# Patient Record
Sex: FEMALE | Race: BLACK OR AFRICAN AMERICAN | Employment: UNEMPLOYED | URBAN - METROPOLITAN AREA
[De-identification: names, ages, dates, MRNs, and addresses within clinical notes are randomized per-mention and may not be internally consistent; named-entity substitution may affect disease eponyms.]

---

## 2017-03-02 ENCOUNTER — OFFICE VISIT (OUTPATIENT)
Dept: FAMILY MEDICINE | Facility: CLINIC | Age: 37
End: 2017-03-02

## 2017-03-02 VITALS
SYSTOLIC BLOOD PRESSURE: 128 MMHG | HEIGHT: 63 IN | BODY MASS INDEX: 32.11 KG/M2 | HEART RATE: 87 BPM | OXYGEN SATURATION: 100 % | TEMPERATURE: 98.1 F | DIASTOLIC BLOOD PRESSURE: 86 MMHG | RESPIRATION RATE: 16 BRPM | WEIGHT: 181.2 LBS

## 2017-03-02 DIAGNOSIS — Z32.01 PREGNANCY EXAMINATION OR TEST, POSITIVE RESULT: Primary | ICD-10-CM

## 2017-03-02 LAB — HCG UR QL: POSITIVE

## 2017-03-02 RX ORDER — PNV NO.118/IRON FUMARATE/FA 29 MG-1 MG
1 TABLET,CHEWABLE ORAL DAILY
Qty: 90 TABLET | Refills: 1 | Status: SHIPPED | OUTPATIENT
Start: 2017-03-02 | End: 2017-04-17

## 2017-03-02 ASSESSMENT — ENCOUNTER SYMPTOMS
PALPITATIONS: 0
FEVER: 0
NAUSEA: 0
FATIGUE: 0
VOMITING: 0

## 2017-03-02 NOTE — PROGRESS NOTES
"      HPI:       Marti Vale is a 37 year old who presents for the following  Patient presents with:  Confirmation Of Pregnancy: patient had positive test at [facility] but wants care here      Pregnancy Test/Confirmation      Marti is a37 year old Woman,   without a significant past medical history who presents requesting a pregnancy test.   Her Patient's last menstrual period unknown.Previous normal periods: YES  Contraceptive method : none  Symptoms of pregnancy: Sour taste in the mouth   Any Bleeding since LMP? no    If pregnant, pregnancy is Unplanned, Desired    A Cameroonian  was used for  this visit.      Problem, Medication and Allergy Lists were reviewed and are current.  Patient is an established patient of this clinic.         Review of Systems:   Review of Systems   Constitutional: Negative for fatigue and fever.   Cardiovascular: Negative for chest pain, palpitations and leg swelling.   Gastrointestinal: Negative for nausea and vomiting.   Genitourinary: Negative for difficulty urinating, dysuria, menstrual problem, vaginal bleeding and vaginal discharge.   Neurological: Negative.    Psychiatric/Behavioral: Negative.              Physical Exam:   Patient Vitals for the past 24 hrs:   BP Temp Temp src Pulse Resp SpO2 Height Weight   17 1440 128/86 98.1  F (36.7  C) Oral 87 16 100 % 5' 2.5\" (158.8 cm) 181 lb 3.2 oz (82.2 kg)     Body mass index is 32.61 kg/(m^2).  Vitals were reviewed and were normal     Physical Exam   Constitutional: She is oriented to person, place, and time. She appears well-developed and well-nourished. No distress.   HENT:   Head: Normocephalic and atraumatic.   Cardiovascular: Normal rate, regular rhythm and normal heart sounds.    No murmur heard.  Pulmonary/Chest: Effort normal and breath sounds normal. No respiratory distress. She has no wheezes. She has no rales.   Neurological: She is alert and oriented to person, place, and time.   Psychiatric: She " has a normal mood and affect. Her behavior is normal. Judgment and thought content normal.       Results:      Results from the last 24 hours  Results for orders placed or performed in visit on 17 (from the past 24 hour(s))   HCG Qualitative Urine (UPT) ([place])   Result Value Ref Range    HCG Qual Urine POSITIVE Negative     Assessment and Plan     Marti was seen today for confirmation of pregnancy.    Diagnoses and all orders for this visit:    Pregnancy examination or test, positive result: LMP unknown. UPT positive. Her LMP was end of 2017.   -Dating ultrasound   -Will start pre- vitamins   -Encouraged to avoid medications such as ibuprofen or herbal medicine without consulting with her physician.   -     HCG Qualitative Urine (UPT) ([facility])  -     Cancel: US OB <14 WKS SINGLE OR FIRST GESTATION (IN CLINIC); Future  -     Prenatal Vit-Fe Fumarate-FA (PRENATAL 19) CHEW; Take 1 tablet by mouth daily  -     US OB <14 Weeks w Transvaginal Single; Future      Medications Discontinued During This Encounter   Medication Reason     ibuprofen (ADVIL,MOTRIN) 600 MG tablet Stopped by Patient     Options for treatment and follow-up care were reviewed with the patient. Marti Mairo Vale  engaged in the decision making process and verbalized understanding of the options discussed and agreed with the final plan.    Kellie Montaño MD  PGY 3 [facility]  [phone](pg)

## 2017-03-02 NOTE — PATIENT INSTRUCTIONS
Here is the plan from today's visit    1. Pregnancy examination or test, positive result: UPT positive. LMP not sure and maybe the end of January  - HCG Qualitative Urine (UPT) ([facility])  -  OB <14 WKS SINGLE OR FIRST GESTATION (IN CLINIC); Future  - Prenatal Vit-Fe Fumarate-FA (PRENATAL 19) CHEW; Take 1 tablet by mouth daily  Dispense: 90 tablet; Refill: 1      Please call or return to clinic if your symptoms don't go away.    Follow up plan  Follow up in 4-6 weeks for first OB visit    Thank you for coming to [facility] today.  Lab Testing:  **If you had lab testing today and your results are reassuring or normal they will be mailed to you or sent through [employer] within 7 days.   **If the lab tests need quick action we will call you with the results.  The phone number we will call with results is # [phone] (home) . If this is not the best number please call our clinic and change the number.  Medication Refills:  If you need any refills please call your pharmacy and they will contact us.   If you need to  your refill at a new pharmacy, please contact the new pharmacy directly. The new pharmacy will help you get your medications transferred faster.   Scheduling:  If you have any concerns about today's visit or wish to schedule another appointment please call our office during normal business hours [phone] (8-5:00 M-F)  If a referral was made to a [facility] and you don't get a call from central scheduling please call [phone].  If a Mammogram was ordered for you at [facility] call [phone] to schedule or change your appointment.  If you had an XRay/CT/Ultrasound/MRI ordered the number is [phone] to schedule or change your radiology appointment.   Medical Concerns:  If you have urgent medical concerns please call [phone] at any time of the day.  If you have a medical emergency please call 133.

## 2017-03-02 NOTE — PROGRESS NOTES
Preceptor Attestation:   Patient seen and discussed with the resident. Assessment and plan reviewed with resident and agreed upon.   Supervising Physician:  Lela Post MD  [facility]

## 2017-03-02 NOTE — MR AVS SNAPSHOT
After Visit Summary   3/2/2017    Marti Vale    MRN: [id number]           Patient Information     Date Of Birth          1980        Visit Information        Provider Department      3/2/2017 2:20 PM Kellie Montaño MD [facility]        Today's Diagnoses     Pregnancy examination or test, positive result    -  1      Care Instructions    Here is the plan from today's visit    1. Pregnancy examination or test, positive result: UPT positive. LMP not sure and maybe the end of January  - HCG Qualitative Urine (UPT) ([facility])  -  OB <14 WKS SINGLE OR FIRST GESTATION (IN CLINIC); Future  - Prenatal Vit-Fe Fumarate-FA (PRENATAL 19) CHEW; Take 1 tablet by mouth daily  Dispense: 90 tablet; Refill: 1      Please call or return to clinic if your symptoms don't go away.    Follow up plan  Follow up in 4-6 weeks for first OB visit    Thank you for coming to [facility] today.  Lab Testing:  **If you had lab testing today and your results are reassuring or normal they will be mailed to you or sent through [employer] within 7 days.   **If the lab tests need quick action we will call you with the results.  The phone number we will call with results is # [phone] (home) . If this is not the best number please call our clinic and change the number.  Medication Refills:  If you need any refills please call your pharmacy and they will contact us.   If you need to  your refill at a new pharmacy, please contact the new pharmacy directly. The new pharmacy will help you get your medications transferred faster.   Scheduling:  If you have any concerns about today's visit or wish to schedule another appointment please call our office during normal business hours [phone] (8-5:00 M-F)  If a referral was made to a [facility] and you don't get a call from central scheduling please call [phone].  If a Mammogram was ordered for you at [facility]  call [phone] to schedule or change your appointment.  If you had an XRay/CT/Ultrasound/MRI ordered the number is [phone] to schedule or change your radiology appointment.   Medical Concerns:  If you have urgent medical concerns please call [phone] at any time of the day.  If you have a medical emergency please call 911.        Follow-ups after your visit        Future tests that were ordered for you today     Open Future Orders        Priority Expected Expires Ordered    US OB <14 WKS SINGLE OR FIRST GESTATION (IN CLINIC) Routine  3/2/2018 3/2/2017            Who to contact     Please call your clinic at [phone] to:    Ask questions about your health    Make or cancel appointments    Discuss your medicines    Learn about your test results    Speak to your doctor   If you have compliments or concerns about an experience at your clinic, or if you wish to file a complaint, please contact [facility] Patient Relations at [phone] or email us at [email or web address]         Additional Information About Your Visit        [employer] Information     [employer] is an electronic gateway that provides easy, online access to your medical records. With [employer], you can request a clinic appointment, read your test results, renew a prescription or communicate with your care team.     To sign up for [employer] visit the website at [email or web address]   You will be asked to enter the access code listed below, as well as some personal information. Please follow the directions to create your username and password.     Your access code is: [email or web address]  Expires: 2017  3:11 PM     Your access code will  in 90 days. If you need help or a new code, please contact your [facility] or call [phone] for assistance.        Care EveryWhere ID     This is your Care EveryWhere ID. This could be used by other organizations to access your  "[facility] medical records  [id number]        Your Vitals Were     Pulse Temperature Respirations Height Last Period Pulse Oximetry    87 98.1  F (36.7  C) (Oral) 16 5' 2.5\" (158.8 cm) 01/02/2017 (Approximate) 100%    BMI (Body Mass Index)                   32.61 kg/m2            Blood Pressure from Last 3 Encounters:   03/02/17 128/86   10/21/15 109/75   10/07/15 118/78    Weight from Last 3 Encounters:   03/02/17 181 lb 3.2 oz (82.2 kg)   10/21/15 185 lb 12.8 oz (84.3 kg)   10/07/15 182 lb (82.6 kg)              We Performed the Following     HCG Qualitative Urine (UPT) ([facility])          Today's Medication Changes          These changes are accurate as of: 3/2/17  3:11 PM.  If you have any questions, ask your nurse or doctor.               Start taking these medicines.        Dose/Directions    PRENATAL 19 Chew   Used for:  Pregnancy examination or test, positive result   Started by:  Kellie Montaño MD        Dose:  1 tablet   Take 1 tablet by mouth daily   Quantity:  90 tablet   Refills:  1            Where to get your medicines      These medications were sent to [facility] [place], [place] - [address]   [address] [facility] [address]     Phone:  [phone]     PRENATAL 19 Chew                Primary Care Provider Office Phone # Fax #    [facility] [phone]       [address] [facility] [address]        Thank you!     Thank you for choosing [facility]  for your care. Our goal is always to provide you with excellent care. Hearing back from our patients is one way we can continue to improve our services. Please take a few minutes to complete the written survey that you may receive in the mail after your visit with us. Thank you!             Your Updated Medication List - Protect others around you: Learn how to safely use, store and throw away your medicines at www.disposemymeds.org.          This list is accurate as of: 3/2/17  3:11 " PM.  Always use your most recent med list.                   Brand Name Dispense Instructions for use    PRENATAL 19 Chew     90 tablet    Take 1 tablet by mouth daily       TYLENOL PO      Take 650 mg by mouth as needed for mild pain or fever

## 2017-03-03 ENCOUNTER — TELEPHONE (OUTPATIENT)
Dept: FAMILY MEDICINE | Facility: CLINIC | Age: 37
End: 2017-03-03

## 2017-03-03 ASSESSMENT — ENCOUNTER SYMPTOMS
DIFFICULTY URINATING: 0
DYSURIA: 0
NEUROLOGICAL NEGATIVE: 1
PSYCHIATRIC NEGATIVE: 1

## 2017-03-03 NOTE — TELEPHONE ENCOUNTER
Confirmation of pregnancy visit: 3/2/17    LMP: en of January 2017  Gestational Age: 5-6 weeks  Estimated Date of Delivery: TBD by US    Dating US has been ordered. Please inform patient to call [phone] to schedule US after 3/18/17.     NOB may be scheduled after US is completed. May be scheduled same day, but US must be completed first.    When calling to schedule NOB, please give scheduling preference to the following providers:    Male:  1. Dr. Pool  2. Dr. Mattson    Female:  1. Dr. LUPE Mena  2. Dr. Pierre    Other Notes:  Beninese  needed.    Please document call and close encounter.    Tiny Patterson RN

## 2017-03-08 ENCOUNTER — OFFICE VISIT (OUTPATIENT)
Dept: FAMILY MEDICINE | Facility: CLINIC | Age: 37
End: 2017-03-08

## 2017-03-08 VITALS
HEART RATE: 82 BPM | TEMPERATURE: 98.3 F | WEIGHT: 182 LBS | OXYGEN SATURATION: 99 % | SYSTOLIC BLOOD PRESSURE: 128 MMHG | HEIGHT: 63 IN | DIASTOLIC BLOOD PRESSURE: 84 MMHG | BODY MASS INDEX: 32.25 KG/M2 | RESPIRATION RATE: 16 BRPM

## 2017-03-08 DIAGNOSIS — O09.521 HIGH-RISK PREGNANCY, ELDERLY MULTIGRAVIDA, FIRST TRIMESTER: ICD-10-CM

## 2017-03-08 DIAGNOSIS — O21.9 NAUSEA AND VOMITING DURING PREGNANCY: Primary | ICD-10-CM

## 2017-03-08 DIAGNOSIS — K21.9 GASTROESOPHAGEAL REFLUX DISEASE WITHOUT ESOPHAGITIS: ICD-10-CM

## 2017-03-08 PROBLEM — O09.529 HIGH-RISK PREGNANCY, ELDERLY MULTIGRAVIDA, UNSPECIFIED TRIMESTER: Status: ACTIVE | Noted: 2017-03-08

## 2017-03-08 RX ORDER — ONDANSETRON 4 MG/1
4 TABLET, FILM COATED ORAL EVERY 8 HOURS PRN
Qty: 18 TABLET | Refills: 1 | Status: SHIPPED | OUTPATIENT
Start: 2017-03-08 | End: 2017-04-17

## 2017-03-08 ASSESSMENT — ENCOUNTER SYMPTOMS
ABDOMINAL PAIN: 0
VOMITING: 1
WHEEZING: 0
NAUSEA: 1
COUGH: 0
TROUBLE SWALLOWING: 0
DYSURIA: 0
FEVER: 0
EYE PAIN: 0

## 2017-03-08 NOTE — PROGRESS NOTES
"      HPI:       Marti Vale is a 37 year old who presents for the following  Patient presents with:  Vomiting: and heartburn X2days. Would like some zolfan and zantac  6w5d  Patient's last menstrual period was 01/20/2017 (exact date).  Has US scheduled tomorrow. Denies bleeding or pain.     GERD/Heartburn     Onset: 2 Days Ago    Description:     Burning in chest:no    Intensity: moderate    Progression of Symptoms: same    Accompanying Signs & Symptoms:  Does it feel like food gets stuck:no  Nausea: Yes Details: Vomiting (Pt is Pregnant)   Vomiting (bloody?): no  Abdominal Pain: no  Black-Tarry stools:no  Bloody stools: no   History:   Previous ulcers: {no    Precipitating factors:   Caffeine use: no  Alcohol use: no  NSAID/Aspirin use: no  Tobacco use: no  Worse with no particular food or drink.    Alleviating factors:  Nothing         Therapies Tried and outcome:chewable antacid  Pt states that she usually has nausea during pregnancies in the past.   A [employer]  was used for  this visit.      Problem, Medication and Allergy Lists were reviewed and are current.  Patient is an established patient of this clinic.         Review of Systems:   Review of Systems   Constitutional: Negative for fever.   HENT: Negative for trouble swallowing.    Eyes: Negative for pain.   Respiratory: Negative for cough and wheezing.    Cardiovascular: Negative for chest pain.   Gastrointestinal: Positive for nausea and vomiting. Negative for abdominal pain.   Genitourinary: Negative for dysuria.             Physical Exam:     Patient Vitals for the past 24 hrs:   BP Temp Temp src Pulse Resp SpO2 Height Weight   03/08/17 0906 128/84 98.3  F (36.8  C) Oral 82 16 99 % 5' 2.5\" (158.8 cm) 182 lb (82.6 kg)     Body mass index is 32.76 kg/(m^2).  Vitals were reviewed and were normal     Physical Exam   Constitutional: She is oriented to person, place, and time. She appears well-developed and well-nourished. No distress. "   Cardiovascular: Normal rate.    Neurological: She is alert and oriented to person, place, and time.   Skin: Skin is warm and dry. She is not diaphoretic.   Psychiatric: She has a normal mood and affect. Her behavior is normal. Thought content normal.   Nursing note and vitals reviewed.        Results:      Results from the last 24 hoursNo results found for this or any previous visit (from the past 24 hour(s)).  Assessment and Plan        1. Nausea and vomiting during pregnancy    - ondansetron (ZOFRAN) 4 MG tablet; Take 1 tablet (4 mg) by mouth every 8 hours as needed for nausea    Demonstrated P9 Accupressure point.     2. Gastroesophageal reflux disease without esophagitis    - ranitidine (ZANTAC) 300 MG tablet; Take 1 tablet (300 mg) by mouth At Bedtime       - Avoid spicy, fried, and foods with a lot of fat.      Please call or return to clinic if your symptoms don't go away.    Follow up plan  Please make a clinic appointment for follow up with your primary physician, Clinic in 1-2 weeks for first prenatal visit.      There are no discontinued medications.  Options for treatment and follow-up care were reviewed with the patient. Marti Vale  engaged in the decision making process and verbalized understanding of the options discussed and agreed with the final plan.    Yeison Corley MD

## 2017-03-08 NOTE — PATIENT INSTRUCTIONS
Here is the plan from today's visit    1. Nausea and vomiting during pregnancy    - ondansetron (ZOFRAN) 4 MG tablet; Take 1 tablet (4 mg) by mouth every 8 hours as needed for nausea      2. Gastroesophageal reflux disease without esophagitis    - ranitidine (ZANTAC) 300 MG tablet; Take 1 tablet (300 mg) by mouth At Bedtime       - Avoid spicy, fried, and foods with a lot of fat.      Please call or return to clinic if your symptoms don't go away.    Follow up plan  Please make a clinic appointment for follow up with your primary physician, Clinic in 1-2 weeks for first prenatal visit.    Thank you for coming to [facility] today.  Lab Testing:  **If you had lab testing today and your results are reassuring or normal they will be mailed to you or sent through [employer] within 7 days.   **If the lab tests need quick action we will call you with the results.  The phone number we will call with results is # [phone] (home) . If this is not the best number please call our clinic and change the number.  Medication Refills:  If you need any refills please call your pharmacy and they will contact us.   If you need to  your refill at a new pharmacy, please contact the new pharmacy directly. The new pharmacy will help you get your medications transferred faster.   Scheduling:  If you have any concerns about today's visit or wish to schedule another appointment please call our office during normal business hours [phone] (8-5:00 M-F)  If a referral was made to a [facility] and you don't get a call from central scheduling please call [phone].  If a Mammogram was ordered for you at [facility] call [phone] to schedule or change your appointment.  If you had an XRay/CT/Ultrasound/MRI ordered the number is [phone] to schedule or change your radiology appointment.   Medical Concerns:  If you have urgent medical concerns please call [phone] at any time of the  day.  If you have a medical emergency please call 911.

## 2017-03-08 NOTE — LETTER
[facility]  [address],  [address]  [facility] [address]  [phone]  [phone]    3/8/2017    Marti Vale  [address] [id number] [phone] (home)          To Whom it May Concern:    Marti Vale missed school on 3/8/2017 due to illness, patient should return to work on 3/10/17 without restrictions.    Please contact me for any questions or concerns.    Sincerely,      Yeison Corley MD

## 2017-03-08 NOTE — MR AVS SNAPSHOT
After Visit Summary   3/8/2017    Marti Vale    MRN: [id number]           Patient Information     Date Of Birth          1980        Visit Information        Provider Department      3/8/2017 9:00 AM Yeison Corley MD [facility]        Today's Diagnoses     Nausea and vomiting during pregnancy    -  1    Gastroesophageal reflux disease without esophagitis          Care Instructions    Here is the plan from today's visit    1. Nausea and vomiting during pregnancy    - ondansetron (ZOFRAN) 4 MG tablet; Take 1 tablet (4 mg) by mouth every 8 hours as needed for nausea      2. Gastroesophageal reflux disease without esophagitis    - ranitidine (ZANTAC) 300 MG tablet; Take 1 tablet (300 mg) by mouth At Bedtime       - Avoid spicy, fried, and foods with a lot of fat.      Please call or return to clinic if your symptoms don't go away.    Follow up plan  Please make a clinic appointment for follow up with your primary physician, Clinic in 1-2 weeks for first prenatal visit.    Thank you for coming to [facility] today.  Lab Testing:  **If you had lab testing today and your results are reassuring or normal they will be mailed to you or sent through [employer] within 7 days.   **If the lab tests need quick action we will call you with the results.  The phone number we will call with results is # [phone] (home) . If this is not the best number please call our clinic and change the number.  Medication Refills:  If you need any refills please call your pharmacy and they will contact us.   If you need to  your refill at a new pharmacy, please contact the new pharmacy directly. The new pharmacy will help you get your medications transferred faster.   Scheduling:  If you have any concerns about today's visit or wish to schedule another appointment please call our office during normal business hours [phone] (8-5:00 M-F)  If a referral was made to a [facility] and you don't get a call from central scheduling please call [phone].  If a Mammogram was ordered for you at [facility] call [phone] to schedule or change your appointment.  If you had an XRay/CT/Ultrasound/MRI ordered the number is [phone] to schedule or change your radiology appointment.   Medical Concerns:  If you have urgent medical concerns please call [phone] at any time of the day.  If you have a medical emergency please call 911.          Follow-ups after your visit        Your next 10 appointments already scheduled     Mar 09, 2017  3:40 PM CST   UL[facility]OUND with [facility] ([facility])    [address],  [address]  [place] [address]   [phone]              Who to contact     Please call your clinic at [phone] to:    Ask questions about your health    Make or cancel appointments    Discuss your medicines    Learn about your test results    Speak to your doctor   If you have compliments or concerns about an experience at your clinic, or if you wish to file a complaint, please contact [facility] Patient Relations at [phone] or email us at [email or web address]         Additional Information About Your Visit        [employer] Information     [employer] is an electronic gateway that provides easy, online access to your medical records. With [employer], you can request a clinic appointment, read your test results, renew a prescription or communicate with your care team.     To sign up for [employer] visit the website at [email or web address]   You will be asked to enter the access code listed below, as well as some personal information. Please follow the directions to create your username and password.     Your access code is: [email or web address]  Expires: 2017  3:11 PM     Your access code will  in 90 days. If you need help or a new code, please contact  "your [facility] or call [phone] for assistance.        Care EveryWhere ID     This is your Care EveryWhere ID. This could be used by other organizations to access your [facility] medical records  [id number]        Your Vitals Were     Pulse Temperature Respirations Height Last Period Pulse Oximetry    82 98.3  F (36.8  C) (Oral) 16 5' 2.5\" (158.8 cm) 01/20/2017 (Exact Date) 99%    Breastfeeding? BMI (Body Mass Index)                No 32.76 kg/m2           Blood Pressure from Last 3 Encounters:   03/08/17 128/84   03/02/17 128/86   10/21/15 109/75    Weight from Last 3 Encounters:   03/08/17 182 lb (82.6 kg)   03/02/17 181 lb 3.2 oz (82.2 kg)   10/21/15 185 lb 12.8 oz (84.3 kg)              Today, you had the following     No orders found for display         Today's Medication Changes          These changes are accurate as of: 3/8/17  9:44 AM.  If you have any questions, ask your nurse or doctor.               Start taking these medicines.        Dose/Directions    ondansetron 4 MG tablet   Commonly known as:  ZOFRAN   Used for:  Nausea and vomiting during pregnancy   Started by:  Yeison Corley MD        Dose:  4 mg   Take 1 tablet (4 mg) by mouth every 8 hours as needed for nausea   Quantity:  18 tablet   Refills:  1       ranitidine 300 MG tablet   Commonly known as:  ZANTAC   Used for:  Gastroesophageal reflux disease without esophagitis   Started by:  Yeison Corley MD        Dose:  300 mg   Take 1 tablet (300 mg) by mouth At Bedtime   Quantity:  30 tablet   Refills:  1            Where to get your medicines      These medications were sent to [facility] [place], [place] - [address]  [address] [facility] [address]     Phone:  [phone]     ondansetron 4 MG tablet    ranitidine 300 MG tablet                Primary Care Provider Office Phone # Fax #    [facility] [phone]       [address] [facility] [address]      "   Thank you!     Thank you for choosing [facility]  for your care. Our goal is always to provide you with excellent care. Hearing back from our patients is one way we can continue to improve our services. Please take a few minutes to complete the written survey that you may receive in the mail after your visit with us. Thank you!             Your Updated Medication List - Protect others around you: Learn how to safely use, store and throw away your medicines at www.disposemymeds.org.          This list is accurate as of: 3/8/17  9:44 AM.  Always use your most recent med list.                   Brand Name Dispense Instructions for use    ondansetron 4 MG tablet    ZOFRAN    18 tablet    Take 1 tablet (4 mg) by mouth every 8 hours as needed for nausea       PRENATAL 19 Chew     90 tablet    Take 1 tablet by mouth daily       ranitidine 300 MG tablet    ZANTAC    30 tablet    Take 1 tablet (300 mg) by mouth At Bedtime       TYLENOL PO      Take 650 mg by mouth as needed for mild pain or fever Reported on 3/8/2017

## 2017-03-08 NOTE — LETTER
[facility]   [address],  [address]  [facility] [address]  [phone]  [phone]    3/8/2017    Marti Vale  [address] [facility] [address] [phone] (home)     :  1980    To Whom it May Concern:    Marti Jhonyyaritza Kavin missed work on 3/8/2017 due to illness patient should return to work on 3/10/17 without restrictions.    Please contact me for any questions or concerns.    Sincerely,      Yeison Corley MD

## 2017-03-08 NOTE — NURSING NOTE
name: Pati Ruslan  Language: Sammarinese  Agency: [employer]  Phone number: [phone]  Marika Benz CMA

## 2017-03-08 NOTE — TELEPHONE ENCOUNTER
Inform patient: You will see two providers throughout your prenatal care. One of them may be a male, will that be a problem for you? No    Additional patient comments: US scheduled for 3/9/17, which is too soon. Advised patient of this and of appointment cancellation. Advised patient that she will need to call [facility] to reschedule US for after 3/18/17. Patient advised that she was driving and unable to accept phone number for US scheduling; will call back. Advised patient to call [facility] after US completed in order to schedule NOB appointment.    Dating US scheduled: Patient will call to schedule  NOB scheduled TBD with TBD (this will be their primary OB provider).    Melina Gallego

## 2017-03-20 ENCOUNTER — HOSPITAL ENCOUNTER (OUTPATIENT)
Dept: ULTRASOUND IMAGING | Facility: CLINIC | Age: 37
Discharge: HOME | End: 2017-03-20
Attending: FAMILY MEDICINE | Admitting: FAMILY MEDICINE
Payer: COMMERCIAL

## 2017-03-20 DIAGNOSIS — Z32.01 PREGNANCY EXAMINATION OR TEST, POSITIVE RESULT: ICD-10-CM

## 2017-03-20 PROCEDURE — 76801 OB US < 14 WKS SINGLE FETUS: CPT

## 2017-03-21 ENCOUNTER — OFFICE VISIT (OUTPATIENT)
Dept: FAMILY MEDICINE | Facility: CLINIC | Age: 37
End: 2017-03-21

## 2017-03-21 VITALS
TEMPERATURE: 98 F | WEIGHT: 183.4 LBS | BODY MASS INDEX: 33.01 KG/M2 | RESPIRATION RATE: 16 BRPM | SYSTOLIC BLOOD PRESSURE: 105 MMHG | OXYGEN SATURATION: 100 % | DIASTOLIC BLOOD PRESSURE: 73 MMHG | HEART RATE: 79 BPM

## 2017-03-21 DIAGNOSIS — O09.529 HIGH-RISK PREGNANCY, ELDERLY MULTIGRAVIDA, UNSPECIFIED TRIMESTER: Primary | ICD-10-CM

## 2017-03-21 DIAGNOSIS — O21.9 NAUSEA/VOMITING IN PREGNANCY: ICD-10-CM

## 2017-03-21 DIAGNOSIS — Z86.39 HISTORY OF VITAMIN D DEFICIENCY: ICD-10-CM

## 2017-03-21 LAB
ABO + RH BLD: NORMAL
ABO + RH BLD: NORMAL
BILIRUBIN UR: NEGATIVE
BLD GP AB SCN SERPL QL: NORMAL
BLOOD BANK CMNT PATIENT-IMP: NORMAL
BLOOD UR: NEGATIVE
DEPRECATED CALCIDIOL+CALCIFEROL SERPL-MC: 14 UG/L (ref 20–75)
GLUCOSE URINE: NEGATIVE
HBV SURFACE AB SERPL IA-ACNC: 76.07 M[IU]/ML
HBV SURFACE AG SERPL QL IA: NONREACTIVE
HEMOGLOBIN: 13 G/DL (ref 11.7–15.7)
HIV 1+2 AB+HIV1 P24 AG SERPL QL IA: NORMAL
KETONES UR QL: NEGATIVE
LEUKOCYTE ESTERASE UR: NEGATIVE
NITRITE UR QL STRIP: NEGATIVE
PH UR STRIP: 7 [PH] (ref 5–7)
PROTEIN UR: NEGATIVE
SP GR UR STRIP: 1.01
SPECIMEN EXP DATE BLD: NORMAL
UROBILINOGEN UR STRIP-ACNC: NORMAL

## 2017-03-21 RX ORDER — CALCIUM CARBONATE 500 MG/1
2 TABLET, CHEWABLE ORAL 3 TIMES DAILY PRN
Qty: 150 TABLET | Refills: 1 | Status: SHIPPED | OUTPATIENT
Start: 2017-03-21 | End: 2017-04-17

## 2017-03-21 RX ORDER — PYRIDOXINE HCL (VITAMIN B6) 25 MG
25 TABLET ORAL DAILY
Qty: 28 TABLET | Refills: 2 | Status: SHIPPED | OUTPATIENT
Start: 2017-03-21 | End: 2017-04-17

## 2017-03-21 ASSESSMENT — ENCOUNTER SYMPTOMS
NAUSEA: 1
CARDIOVASCULAR NEGATIVE: 1
RESPIRATORY NEGATIVE: 1
APPETITE CHANGE: 1
VOMITING: 1
ABDOMINAL PAIN: 0

## 2017-03-21 NOTE — TELEPHONE ENCOUNTER
US completed 3/20/17. Patient scheduled NOB appointment with Dr LUPE Mena at 1:00pm on 3/27/17. Patient advised that she would only like to see female providers only.

## 2017-03-21 NOTE — MR AVS SNAPSHOT
After Visit Summary   3/21/2017    Marti aVle    MRN: [id number]           Patient Information     Date Of Birth          1980        Visit Information        Provider Department      3/21/2017 8:40 AM Sonam Veloz APRN CNP [facility]        Today's Diagnoses     High-risk pregnancy, elderly multigravida, unspecified trimester    -  1    Nausea/vomiting in pregnancy        History of vitamin D deficiency          Care Instructions    Here is the plan from today's visit    1. Nausea/vomiting in pregnancy    - doxylamine (UNISOM) 25 MG TABS tablet; Take 1 tablet (25 mg) by mouth At Bedtime  Dispense: 28 each; Refill: 2  - pyridOXINE (VITAMIN B-6) 25 MG tablet; Take 1 tablet (25 mg) by mouth daily  Dispense: 28 tablet; Refill: 2  - calcium carbonate (TUMS) 500 MG chewable tablet; Take 2 tablets (1,000 mg) by mouth 3 times daily as needed for heartburn  Dispense: 150 tablet; Refill: 1    2. High-risk pregnancy, elderly multigravida, unspecified trimester    - Hemoglobin (HGB) (LabDAQ)  - Hepatitis B surface antigen  - Hepatitis B Surface Antibody  - Anti Treponema  - HIV Antigen Antibody Combo  - Urine Culture Aerobic Bacterial  - Rubella Antibody IgG Quantitative  - Varicella Zoster Virus Antibody IgG  - ABO/Rh type and screen  - Neisseria gonorrhoeae PCR  - Chlamydia trachomatis PCR  - Urinalysis(LabDAQ)  - Vitamin D Level    3. History of vitamin D deficiency    - cholecalciferol (VITAMIN D) 1000 UNIT tablet; Take 1 tablet (1,000 Units) by mouth daily  Dispense: 100 tablet; Refill: 3      Follow-up for 1st OB appointment.        Thank you for coming to [facility] today.  Lab Testing:  **If you had lab testing today and your results are reassuring or normal they will be mailed to you or sent through [employer] within 7 days.   **If the lab tests need quick action we will call you with the results.  The phone number we will call with results is # [phone]  (home) . If this is not the best number please call our clinic and change the number.  Medication Refills:  If you need any refills please call your pharmacy and they will contact us.   If you need to  your refill at a new pharmacy, please contact the new pharmacy directly. The new pharmacy will help you get your medications transferred faster.   Scheduling:  If you have any concerns about today's visit or wish to schedule another appointment please call our office during normal business hours [phone] (8-5:00 M-F)  If a referral was made to a [facility] and you don't get a call from central scheduling please call [phone].  If a Mammogram was ordered for you at [facility] call [phone] to schedule or change your appointment.  If you had an XRay/CT/Ultrasound/MRI ordered the number is [phone] to schedule or change your radiology appointment.   Medical Concerns:  If you have urgent medical concerns please call [phone] at any time of the day.  If you have a medical emergency please call [phone].          Follow-ups after your visit        Who to contact     Please call your clinic at [phone] to:    Ask questions about your health    Make or cancel appointments    Discuss your medicines    Learn about your test results    Speak to your doctor   If you have compliments or concerns about an experience at your clinic, or if you wish to file a complaint, please contact [facility] Patient Relations at [phone] or email us at [email or web address]         Additional Information About Your Visit        MyChart Information     [employer] is an electronic gateway that provides easy, online access to your medical records. With [employer], you can request a clinic appointment, read your test results, renew a prescription or communicate with your care team.     To sign up for [employer] visit the website at [email or web address]    You will be asked to enter the access code listed below, as well as some personal information. Please follow the directions to create your username and password.     Your access code is: [email or web address]  Expires: 2017  4:11 PM     Your access code will  in 90 days. If you need help or a new code, please contact your [facility] or call [phone] for assistance.        Care EveryWhere ID     This is your Care EveryWhere ID. This could be used by other organizations to access your [place] medical records  [id number]        Your Vitals Were     Pulse Temperature Respirations Last Period Pulse Oximetry BMI (Body Mass Index)    79 98  F (36.7  C) (Oral) 16 2017 (Exact Date) 100% 33.01 kg/m2       Blood Pressure from Last 3 Encounters:   17 105/73   17 128/84   17 128/86    Weight from Last 3 Encounters:   17 183 lb 6.4 oz (83.2 kg)   17 182 lb (82.6 kg)   17 181 lb 3.2 oz (82.2 kg)              We Performed the Following     ABO/Rh type and screen     Anti Treponema     Chlamydia trachomatis PCR     Hemoglobin (HGB) (LabDAQ)     Hepatitis B Surface Antibody     Hepatitis B surface antigen     HIV Antigen Antibody Combo     Neisseria gonorrhoeae PCR     Rubella Antibody IgG Quantitative     Urinalysis(LabDAQ)     Urine Culture Aerobic Bacterial     Varicella Zoster Virus Antibody IgG     Vitamin D Level          Today's Medication Changes          These changes are accurate as of: 3/21/17  9:25 AM.  If you have any questions, ask your nurse or doctor.               Start taking these medicines.        Dose/Directions    calcium carbonate 500 MG chewable tablet   Commonly known as:  TUMS   Used for:  Nausea/vomiting in pregnancy   Started by:  Sonam Veloz APRN CNP        Dose:  2 chew tab   Take 2 tablets (1,000 mg) by mouth 3 times daily as needed for heartburn   Quantity:  150 tablet   Refills:  1       cholecalciferol 1000  UNIT tablet   Commonly known as:  vitamin D   Used for:  History of vitamin D deficiency   Started by:  Sonam Veloz APRN CNP        Dose:  1000 Units   Take 1 tablet (1,000 Units) by mouth daily   Quantity:  100 tablet   Refills:  3       doxylamine 25 MG Tabs tablet   Commonly known as:  UNISOM   Used for:  Nausea/vomiting in pregnancy   Started by:  Sonam Veloz APRN CNP        Dose:  25 mg   Take 1 tablet (25 mg) by mouth At Bedtime   Quantity:  28 each   Refills:  2       pyridOXINE 25 MG tablet   Commonly known as:  vitamin B-6   Used for:  Nausea/vomiting in pregnancy   Started by:  Sonam Veloz APRN CNP        Dose:  25 mg   Take 1 tablet (25 mg) by mouth daily   Quantity:  28 tablet   Refills:  2            Where to get your medicines      These medications were sent to [facility] [place], [place] - [address] [facility] [address]     Phone:  [phone]     calcium carbonate 500 MG chewable tablet    cholecalciferol 1000 UNIT tablet    doxylamine 25 MG Tabs tablet    pyridOXINE 25 MG tablet                Primary Care Provider Office Phone # Fax #    [facility] [phone]       [address] [facility] [address]        Thank you!     Thank you for choosing [facility]  for your care. Our goal is always to provide you with excellent care. Hearing back from our patients is one way we can continue to improve our services. Please take a few minutes to complete the written survey that you may receive in the mail after your visit with us. Thank you!             Your Updated Medication List - Protect others around you: Learn how to safely use, store and throw away your medicines at www.disposemymeds.org.          This list is accurate as of: 3/21/17  9:25 AM.  Always use your most recent med list.                   Brand Name Dispense Instructions for use    calcium carbonate 500 MG chewable tablet     TUMS    150 tablet    Take 2 tablets (1,000 mg) by mouth 3 times daily as needed for heartburn       cholecalciferol 1000 UNIT tablet    vitamin D    100 tablet    Take 1 tablet (1,000 Units) by mouth daily       doxylamine 25 MG Tabs tablet    UNISOM    28 each    Take 1 tablet (25 mg) by mouth At Bedtime       ondansetron 4 MG tablet    ZOFRAN    18 tablet    Take 1 tablet (4 mg) by mouth every 8 hours as needed for nausea       PRENATAL 19 Chew     90 tablet    Take 1 tablet by mouth daily       pyridOXINE 25 MG tablet    vitamin B-6    28 tablet    Take 1 tablet (25 mg) by mouth daily       ranitidine 300 MG tablet    ZANTAC    30 tablet    Take 1 tablet (300 mg) by mouth At Bedtime       TYLENOL PO      Take 650 mg by mouth as needed for mild pain or fever Reported on 3/8/2017

## 2017-03-21 NOTE — PROGRESS NOTES
HPI:       Marti Vale is a 37 year old who presents for the following  Patient presents with:  Nausea    She is 8w5d. Had a dating ultrasound yesterday. States she has had nausea for about one month. This is her 8th pregnancy. States she has had nausea with some of her pregnancies in the past, but does not remember what she took.  Nausea is worst in the afternoon around 1400.  Has nausea every day for the past month.  States she vomits a small amount everyday.  Has nausea before and after meals. Has taken 4mg ondansetron HCl as needed every 8 hours with no relief. She is also taking ranitidine every day in the morning.  States she is only able to eat pasta and bread.  States she often eats spicy sauce in the afternoon.  She is drinking less than one bottle of water each day. She has not tried any other medications. She did take vitamin B6 for a previous pregnancy.      A [employer]  was used for  this visit.      Problem, Medication and Allergy Lists were   reviewed and are current.     Patient Active Problem List    Diagnosis Date Noted     Ileitis 10/14/2015     Priority: Medium     High-risk pregnancy, elderly multigravida, unspecified trimester 2017       Estimated Date of Delivery: Oct 27, 2017 by Patient's last menstrual period was 2017 (exact date).           ,     Current Outpatient Prescriptions   Medication Sig Dispense Refill     doxylamine (UNISOM) 25 MG TABS tablet Take 1 tablet (25 mg) by mouth At Bedtime 28 each 2     pyridOXINE (VITAMIN B-6) 25 MG tablet Take 1 tablet (25 mg) by mouth daily 28 tablet 2     calcium carbonate (TUMS) 500 MG chewable tablet Take 2 tablets (1,000 mg) by mouth 3 times daily as needed for heartburn 150 tablet 1     cholecalciferol (VITAMIN D) 1000 UNIT tablet Take 1 tablet (1,000 Units) by mouth daily 100 tablet 3     ranitidine (ZANTAC) 300 MG tablet Take 1 tablet (300 mg) by mouth At Bedtime 30 tablet 1     ondansetron (ZOFRAN) 4  MG tablet Take 1 tablet (4 mg) by mouth every 8 hours as needed for nausea 18 tablet 1     Prenatal Vit-Fe Fumarate-FA (PRENATAL 19) CHEW Take 1 tablet by mouth daily (Patient not taking: Reported on 3/8/2017) 90 tablet 1     Acetaminophen (TYLENOL PO) Take 650 mg by mouth as needed for mild pain or fever Reported on 3/8/2017       No Known Allergies    Patient is an established patient of this clinic.         Review of Systems:   Review of Systems   Constitutional: Positive for appetite change.   HENT: Negative.    Respiratory: Negative.    Cardiovascular: Negative.    Gastrointestinal: Positive for nausea and vomiting. Negative for abdominal pain.             Physical Exam:   Patient Vitals for the past 24 hrs:   BP Temp Temp src Pulse Resp SpO2 Weight   03/21/17 0852 105/73 98  F (36.7  C) Oral 79 16 100 % 183 lb 6.4 oz (83.2 kg)     Body mass index is 33.01 kg/(m^2).  Vitals were reviewed and were normal     Physical Exam   Constitutional: She appears well-developed and well-nourished. No distress.   Cardiovascular: Normal rate, regular rhythm and normal heart sounds.  Exam reveals no gallop and no friction rub.    No murmur heard.  Pulmonary/Chest: Effort normal and breath sounds normal. No respiratory distress. She has no wheezes. She has no rales. She exhibits no tenderness.   Abdominal: Soft. She exhibits no distension. There is no tenderness.   Skin: She is not diaphoretic.         Results:      Results from the last 24 hours  Results for orders placed or performed in visit on 03/21/17 (from the past 24 hour(s))   Hemoglobin (HGB) (LabDAQ)   Result Value Ref Range    Hemoglobin 13.0 11.7 - 15.7 g/dL   Urinalysis(LabDAQ)   Result Value Ref Range    Specific Gravity Urine 1.015 1.005 - 1.030    pH Urine 7.0 4.5 - 8.0    Leukocyte Esterase UR Negative NEGATIVE    Nitrite Urine Negative NEGATIVE    Protein UR Negative NEGATIVE    Glucose Urine Negative NEGATIVE    Ketones Urine Negative NEGATIVE    Urobilinogen  mg/dL 0.2 E.U./dL 0.2 E.U./dL    Bilirubin UR Negative NEGATIVE    Blood UR Negative NEGATIVE     Other labs pending for initial OB visit.     Assessment and Plan     1. Nausea/vomiting in pregnancy  Patient instructed to eat small frequent meals, increase water intake, and avoid food that increase symptoms. Patient should continue ranitidine every morning, take ondansetron PRN, Tums PRN, and schedule unisom and vitamin B6 at bedtime. Return to clinic if symptoms do not improve or worse with treatment plan.   - doxylamine (UNISOM) 25 MG TABS tablet; Take 1 tablet (25 mg) by mouth At Bedtime  Dispense: 28 each; Refill: 2  - pyridOXINE (VITAMIN B-6) 25 MG tablet; Take 1 tablet (25 mg) by mouth daily  Dispense: 28 tablet; Refill: 2  - calcium carbonate (TUMS) 500 MG chewable tablet; Take 2 tablets (1,000 mg) by mouth 3 times daily as needed for heartburn  Dispense: 150 tablet; Refill: 1    2. High-risk pregnancy, elderly multigravida, unspecified trimester  Patient requested to have labs checked for initial OB visit. Will need to follow-up with results at OB appointment.   - Hemoglobin (HGB) (LabDAQ)  - Hepatitis B surface antigen  - Hepatitis B Surface Antibody  - Anti Treponema  - HIV Antigen Antibody Combo  - Urine Culture Aerobic Bacterial  - Rubella Antibody IgG Quantitative  - Varicella Zoster Virus Antibody IgG  - ABO/Rh type and screen  - Neisseria gonorrhoeae PCR  - Chlamydia trachomatis PCR  - Urinalysis(LabDAQ)  - Vitamin D Level    3. History of vitamin D deficiency  Vitamin D level checked in lab today. Will start with 1000 U tablet today and adjust dose at next visit based on lab result.   - cholecalciferol (VITAMIN D) 1000 UNIT tablet; Take 1 tablet (1,000 Units) by mouth daily  Dispense: 100 tablet; Refill: 3    There are no discontinued medications.    Options for treatment and follow-up care were reviewed with the patient. Marti Vale  engaged in the decision making process and verbalized  understanding of the options discussed and agreed with the final plan.      Doretha Portillo, VIN    History and physical examination done with student nurse practitioner.  Student acted as scribe for this encounter.  I agree with assessment and plan for this patient.     ДМИТРИЙ Aguilar CNP

## 2017-03-21 NOTE — PATIENT INSTRUCTIONS
Here is the plan from today's visit    1. Nausea/vomiting in pregnancy    - doxylamine (UNISOM) 25 MG TABS tablet; Take 1 tablet (25 mg) by mouth At Bedtime  Dispense: 28 each; Refill: 2  - pyridOXINE (VITAMIN B-6) 25 MG tablet; Take 1 tablet (25 mg) by mouth daily  Dispense: 28 tablet; Refill: 2  - calcium carbonate (TUMS) 500 MG chewable tablet; Take 2 tablets (1,000 mg) by mouth 3 times daily as needed for heartburn  Dispense: 150 tablet; Refill: 1    2. High-risk pregnancy, elderly multigravida, unspecified trimester    - Hemoglobin (HGB) ([facility])  - Hepatitis B surface antigen  - Hepatitis B Surface Antibody  - Anti Treponema  - HIV Antigen Antibody Combo  - Urine Culture Aerobic Bacterial  - Rubella Antibody IgG Quantitative  - Varicella Zoster Virus Antibody IgG  - ABO/Rh type and screen  - Neisseria gonorrhoeae PCR  - Chlamydia trachomatis PCR  - Urinalysis([facility])  - Vitamin D Level    3. History of vitamin D deficiency    - cholecalciferol (VITAMIN D) 1000 UNIT tablet; Take 1 tablet (1,000 Units) by mouth daily  Dispense: 100 tablet; Refill: 3      Follow-up for 1st OB appointment.        Thank you for coming to [facility] today.  Lab Testing:  **If you had lab testing today and your results are reassuring or normal they will be mailed to you or sent through [employer] within 7 days.   **If the lab tests need quick action we will call you with the results.  The phone number we will call with results is # [phone] (home) . If this is not the best number please call our clinic and change the number.  Medication Refills:  If you need any refills please call your pharmacy and they will contact us.   If you need to  your refill at a new pharmacy, please contact the new pharmacy directly. The new pharmacy will help you get your medications transferred faster.   Scheduling:  If you have any concerns about today's visit or wish to schedule another appointment please call our office during normal  business hours [phone] (8-5:00 M-F)  If a referral was made to a [facility] and you don't get a call from central scheduling please call [phone].  If a Mammogram was ordered for you at [facility] call [phone] to schedule or change your appointment.  If you had an XRay/CT/Ultrasound/MRI ordered the number is [phone] to schedule or change your radiology appointment.   Medical Concerns:  If you have urgent medical concerns please call [phone] at any time of the day.  If you have a medical emergency please call [phone].

## 2017-03-22 LAB
BACTERIA SPEC CULT: NORMAL
C TRACH DNA SPEC QL NAA+PROBE: NORMAL
Lab: NORMAL
MICRO REPORT STATUS: NORMAL
N GONORRHOEA DNA SPEC QL NAA+PROBE: NORMAL
RUBV IGG SERPL IA-ACNC: 37 IU/ML
SPECIMEN SOURCE: NORMAL
T PALLIDUM IGG+IGM SER QL: NEGATIVE
VZV IGG SER QL IA: 0.9 AI (ref 0–0.8)

## 2017-03-27 ENCOUNTER — OFFICE VISIT (OUTPATIENT)
Dept: FAMILY MEDICINE | Facility: CLINIC | Age: 37
End: 2017-03-27

## 2017-03-27 VITALS
TEMPERATURE: 97.9 F | OXYGEN SATURATION: 100 % | HEIGHT: 63 IN | DIASTOLIC BLOOD PRESSURE: 82 MMHG | SYSTOLIC BLOOD PRESSURE: 126 MMHG | HEART RATE: 70 BPM | WEIGHT: 182.8 LBS | BODY MASS INDEX: 32.39 KG/M2 | RESPIRATION RATE: 18 BRPM

## 2017-03-27 DIAGNOSIS — E55.9 VITAMIN D DEFICIENCY: ICD-10-CM

## 2017-03-27 DIAGNOSIS — O09.529 HIGH-RISK PREGNANCY, ELDERLY MULTIGRAVIDA, UNSPECIFIED TRIMESTER: Primary | ICD-10-CM

## 2017-03-27 DIAGNOSIS — R11.0 NAUSEA: ICD-10-CM

## 2017-03-27 NOTE — PATIENT INSTRUCTIONS
Here is the plan from today's visit    1. High-risk pregnancy, elderly multigravida, unspecified trimester  Your pregnancy is considered high risk since you are over the age of 35 and have had multiple pregnancies.     2. Vitamin D deficiency  Take 1 tablet daily.  We will recheck your level in approximately 3 months.  - cholecalciferol (VITAMIN D) 1000 UNIT tablet; Take 1 tablet (1,000 Units) by mouth daily  Dispense: 100 tablet; Refill: 1    3. Nausea  Continue taking the B6 and Zofran as needed for nausea.  - Acupressure Device (SEA-BAND ADULT) BHARGAVI; 1 Device as needed  Dispense: 1 each; Refill: 0    Please call or return to clinic if your symptoms don't go away.    Follow up plan  Please make a clinic appointment for follow up with me (SHAKIR ELLSWORTH) in 3  weeks for routine OB visit (at 12 weeks).    Thank you for coming to [facility] today.  Lab Testing:  **If you had lab testing today and your results are reassuring or normal they will be mailed to you or sent through [employer] within 7 days.   **If the lab tests need quick action we will call you with the results.  The phone number we will call with results is # [phone] (home) . If this is not the best number please call our clinic and change the number.  Medication Refills:  If you need any refills please call your pharmacy and they will contact us.   If you need to  your refill at a new pharmacy, please contact the new pharmacy directly. The new pharmacy will help you get your medications transferred faster.   Scheduling:  If you have any concerns about today's visit or wish to schedule another appointment please call our office during normal business hours [phone] (8-5:00 M-F)  If a referral was made to a [facility] and you don't get a call from central scheduling please call [phone].  If a Mammogram was ordered for you at [facility] call [phone] to schedule or change your  appointment.  If you had an XRay/CT/Ultrasound/MRI ordered the number is [phone] to schedule or change your radiology appointment.   Medical Concerns:  If you have urgent medical concerns please call [phone] at any time of the day.      Thank you for coming to [facility]!  - If you had lab testing today and your results are normal they will be be mailed to you or sent to your MyChart within seven days.   - If the lab tests need quick action we will call you with the results.  - If any referrals were ordered today you should be getting a call in the next week.  If you don't hear about this within a week please call one of the following numbers       --f your referral is for [facility] please call [phone]    --If your referral is to outside [facility] please call the clinic number ([phone]) and ask for your team care coordinator.  - If you need any refills please call your pharmacy and they will contact us.  - If you have any concerns about today's visit or wish to schedule another appointment please call our office [phone] (8-5:00 M-F)  - If you have urgent medical concerns call [phone] at any time of the day.  - If you have a medical emergency please call [phone].    Because you are pregnant, we have additional resources for you:  - You may call Noemi Cochran, our OB coordinator at [phone] during normal business hours, for non-urgent questions about your pregnancy.  - Immediate OB help is also available 24 hours a day, seven days a week via the [facility] at [phone].    Prenatal Reminders:  Before 14 weeks: dating ultrasound       This ultrasound helps us determine your dates accurately.  15 - 18 weeks: Optional genetic testing (quad screen)       This testing helps understand your baby's risk for some genetic abnormalities.  22 - 24 weeks: Growth ultrasound (fetal survey)       This testing will look for early growth abnormalities, and might tell the baby's sex.  24  - 28 weeks: One hour sugar test (GCT)        This test helps identify diabetes of pregnancy.  27 - 36 weeks: Tetanus shot (Tdap)       This shot helps protect you and your baby from tetanus and whooping cough.  36 weeks and later: Group B Strep test (GBS)       This test helps predict if you need antibiotics in labor to prevent infection in your baby.  Anytime September to April: flu shot       This shot helps protect you and your family from the flu.  This is especially important during pregnancy!  Once every trimester: blood iron test (hemoglobin)       This test helps us know if you will need extra iron to support your baby.    At any time during or after your pregnancy: Some women experience baby blues.  We can help you with:  ? Feeling anxious, ? Overwhelmed or sad       ? Trouble sleeping ? Crying uncontrollably     ? Trouble caring for yourself or baby.    Again, thank you for choosing [facility].  Please let us know how we can best partner with you to improve your and your family's health.

## 2017-03-27 NOTE — PROGRESS NOTES
Preceptor Attestation:   Patient seen and discussed with the resident. Assessment and plan reviewed with resident and agreed upon.   Supervising Physician:  Rainer Guerrero MD  [facility]

## 2017-03-27 NOTE — PROGRESS NOTES
First Obstetric Visit        HPI       Marti Vale is a 37 year old woman who presents for an initial prenatal visit at 9w3d weeks of pregnancy with STALIN of Oct 26, 2017 by LMP of Patient's last menstrual period was 2017..      She has not had bleeding since her LMP.   She has had mild nausea.   Weight loss has not occurred.    This was not a planned pregnancy.     OTHER CONCERNS: none.  Patient has no complaints except mild nausea.  No urinary symptoms such as hematuria, dysuria or frequency.            Labor Risk Assessment     Is the patient's age <18 or >40?     No  Patint's BMI is Body mass index is 32.9 kg/(m^2).   Does patient have a BMI < 18.5?     No  Prior delivery within 6 months?      No  Ever delivered prior to 37 weeks gestation?  No  Pregnancy occur via In Vitro Fertilization?   No  Are you carrying twins?       No    The patient has the following additional risk factors for  labor:   none     Labor Risk Summary:  Pt is high risk for  Labor  No             Past Medical History     Past Medical History:   Diagnosis Date     Anemia        Do you have a history of any of the following medical conditions?    Condition No/Yes/Details   Hypertension No   Heart disease, mitral valve prolapse, rheumatic fever No   Asthma or another chronic lung disease No    An autoimmune disorder No    Kidney disease No    Frequent urinary tract infections No    Migraine headaches No    Stroke, loss of sensation/function, seizures, or other neuro problem No    Diabetes No    Thyroid problems or have you taken thyroid medication No    Hepatitis, liver disease, jaundice No    Blood clots, phlebitis, pulmonary embolism or varicose veins No    Excessive bleeding after surgery or dental work No    Heavy menstrual periods requiring treatment No    Anemia  YES after   D&C, no transfusion   Blood transfusions No         Would you refuse a blood transfusion? No    Breast problems No     Abnormalities of the uterus No    Abnormal pap smear No    Have you been treated for an emotional disturbance? No    Have you been physically, sexually, or emotionally hurt by someone? No    Have you been in a major accident or suffered serious trauma? No    Have you had surgical procedures?  YES         Have you ever had any complications from anesthesia? No    Have you ever been hospitalized for a nonsurgical reason? No                Genetic Screening     Do you have a history of any of the following No/Yes/Details        A metabolic disorder (e.g. Insulin-dependent DM, PKU) No         Recurrent pregnancy loss  YES- 2 miscarriages     Do you, the baby's father, or anyone in your families have          Thalassemia AND MCV <80 No         Hemophilia No         Neural tube defect No }        Congenital heart defect No         Sickle cell disease or trait No         Muscular dystrophy No         Cystic fibrosis No         Mental retardation or autism No         Down's syndrome No         Armond-Sach's disease No         Amador's chorea No         Any other inherited genetic or chromosomal disorder No         A child with birth defects not listed above No                Infection History     At high risk for coming in contact with HIV No    Ever treated for tuberculosis No    Ever received the BCG vaccine for tuberculosis Yes   Ever had genital herpes (or has your partner) No    Had a rash or viral illness since LMP No    Ever had a sexually transmitted infection No    Ever had chicken pox or the vaccine No    Ever had any other serious infectious disease No    Up to date with immunizations  YES             Habits     Have you ever used tobacco products (cigarettes, chewing tobacco)? No, Do you currently use tobacco products? No. , Have you ever used alcohol? No, Have you ever used any other drugs? No         Current Medications      Current Outpatient Prescriptions   Medication Sig Dispense Refill  "    cholecalciferol (VITAMIN D) 1000 UNIT tablet Take 1 tablet (1,000 Units) by mouth daily 100 tablet 1     Acupressure Device (SEA-BAND ADULT) BHARGAVI 1 Device as needed 1 each 0     pyridOXINE (VITAMIN B-6) 25 MG tablet Take 1 tablet (25 mg) by mouth daily 28 tablet 2     Prenatal Vit-Fe Fumarate-FA (PRENATAL 19) CHEW Take 1 tablet by mouth daily 90 tablet 1     doxylamine (UNISOM) 25 MG TABS tablet Take 1 tablet (25 mg) by mouth At Bedtime 28 each 2     calcium carbonate (TUMS) 500 MG chewable tablet Take 2 tablets (1,000 mg) by mouth 3 times daily as needed for heartburn 150 tablet 1     cholecalciferol (VITAMIN D) 1000 UNIT tablet Take 1 tablet (1,000 Units) by mouth daily 100 tablet 3     ranitidine (ZANTAC) 300 MG tablet Take 1 tablet (300 mg) by mouth At Bedtime 30 tablet 1     ondansetron (ZOFRAN) 4 MG tablet Take 1 tablet (4 mg) by mouth every 8 hours as needed for nausea (Patient not taking: Reported on 3/27/2017) 18 tablet 1     Acetaminophen (TYLENOL PO) Take 650 mg by mouth as needed for mild pain or fever Reported on 3/8/2017               Review of Systems      normal menstrual cycles  ====================================================           Physical Exam      /82  Pulse 70  Temp 97.9  F (36.6  C) (Oral)  Resp 18  Ht 5' 2.5\" (158.8 cm)  Wt 182 lb 12.8 oz (82.9 kg)  LMP 01/20/2017 (Exact Date)  SpO2 100%  BMI 32.9 kg/m2  GENERAL: healthy, alert and no distress  HENT: ear canals- normal; TMs- normal; Nose- normal; Mouth- no ulcers, no lesions  NECK: no tenderness, no adenopathy, no asymmetry, no masses, no stiffness; thyroid- normal to palpation  RESP: lungs clear to auscultation - no rales, no rhonchi, no wheezes  CV: regular rates and rhythm, normal S1 S2, no S3 or S4 and no murmur, no click or rub -  ABDOMEN: soft, no tenderness, no  hepatosplenomegaly, no masses, normal bowel sounds  MS: extremities- no gross deformities noted, no edema  SKIN: no suspicious lesions, no " rashes  NEURO: strength and tone- normal, sensory exam- grossly normal, mentation- intact, speech- normal  BACK: no CVA tenderness, no paralumbar tenderness  - female: genital exam deferred  PSYCH: Alert and oriented times 3; coherent speech, normal rate and volume, able to articulate logical thoughts, able to abstract reason, no tangential thoughts, no hallucinations or delusions. Affect is normal.  FHT: not assessed (less than 10 weeks gestation)  =========================================         Assessment and Plan     Marti was seen today for prenatal care.    Diagnoses and all orders for this visit:    High-risk pregnancy, elderly multigravida, unspecified trimester    Vitamin D deficiency  -     cholecalciferol (VITAMIN D) 1000 UNIT tablet; Take 1 tablet (1,000 Units) by mouth daily    Nausea  -     Acupressure Device (SEA-BAND ADULT) BHARGAVI; 1 Device as needed    Of note- patient's blood pressure was WNL limits today, but higher than prior baseline (last 4 clinic visit blood pressures have been higher than prior baseline). Patient may be at risk for pre-eclampsia.  Will continue to follow.    Also discussed with staff urine culture that showed 10-50,000 urogenital katya.  No need to treat at this time.    DEBRA also obtained for [facility].  Patient states she had last pap at [facility].    Patient advised to follow up in 3 weeks for 12 week prenatal visit.    37 year old  , 9w3d weeks of pregnancy with STALIN of Oct 27, 2017 by LMP of Patient's last menstrual period was 2017 (exact date). (Consistent with 8 week ultrasound)    Pregnancy Risk Assessment: Average risk pregnancy but patient is AMA and a multigravida.  -Dating US obtained and dating confirmed?   YES  Taking PNV/Folate?     YES      - OB labs: blood type and antibody screen, HIV, VDRL, hep B, rubella, gonorrhea/chlamydia, UA/UC obtained 3/21.  Reviewed with patient today.  All WNL except Vitamin D level, which was low.    - Discussed first  trimester genetic screening (needs to be done by 11-14 weeks). -        Patient does not want to pursue screening.   - Discussed screening for sickle cell anemia. Patient does not  want to pursue Hb electrophoresis.   - Discussed early screening for gestational diabetes. The patient does not have a history of GDM so WILL NOT obtain early GCT.   - Prenatal vitamins ordered.   - Flu shot offered and was Declined.  Counseling given:   - Follow up in 3 weeks (at 12 weeks) for recheck, discuss results, discuss quad screen.   - Recommended weight gain for pregnancy: 11-20 lbs  - Instructed on best evidence for: healthy diet and foods to avoid; exercise and activity during pregnancy; avoiding exposure to toxoplasmosis; safe use of seatbelts during pregnancy; and maintenance of a generally healthy lifestyle    Discussed the harms, benefits, side effects and alternative therapies for current prescribed and OTC medications.    Discussed  genetic testing recommendations as follows:None    Discussed high risk conditions as follows: Elderly multigravid    Options for treatment and follow-up care were reviewed with the patient and/or guardian. Marti Vale and/or guardian engaged in the decision making process and verbalized understanding of the options discussed and agreed with the final plan.    Bhavna Mena M.D.  PGY-2, Family Medicine

## 2017-03-27 NOTE — MR AVS SNAPSHOT
After Visit Summary   3/27/2017    Marti Vale    MRN: [id number]           Patient Information     Date Of Birth          1980        Visit Information        Provider Department      3/27/2017 1:00 PM Shakir Mena MD [facility]        Today's Diagnoses     High-risk pregnancy, elderly multigravida, unspecified trimester    -  1    Vitamin D deficiency        Nausea          Care Instructions    Here is the plan from today's visit    1. High-risk pregnancy, elderly multigravida, unspecified trimester  Your pregnancy is considered high risk since you are over the age of 35 and have had multiple pregnancies.     2. Vitamin D deficiency  Take 1 tablet daily.  We will recheck your level in approximately 3 months.  - cholecalciferol (VITAMIN D) 1000 UNIT tablet; Take 1 tablet (1,000 Units) by mouth daily  Dispense: 100 tablet; Refill: 1    3. Nausea  Continue taking the B6 and Zofran as needed for nausea.  - Acupressure Device (SEA-BAND ADULT) BHARGAVI; 1 Device as needed  Dispense: 1 each; Refill: 0    Please call or return to clinic if your symptoms don't go away.    Follow up plan  Please make a clinic appointment for follow up with me (SHAKIR MENA) in 3  weeks for routine OB visit (at 12 weeks).    Thank you for coming to [facility] today.  Lab Testing:  **If you had lab testing today and your results are reassuring or normal they will be mailed to you or sent through Boxee within 7 days.   **If the lab tests need quick action we will call you with the results.  The phone number we will call with results is # [phone] (home) . If this is not the best number please call our clinic and change the number.  Medication Refills:  If you need any refills please call your pharmacy and they will contact us.   If you need to  your refill at a new pharmacy, please contact the new pharmacy directly. The new pharmacy will help you get your  medications transferred faster.   Scheduling:  If you have any concerns about today's visit or wish to schedule another appointment please call our office during normal business hours [phone] (8-5:00 M-F)  If a referral was made to a [facility] and you don't get a call from central scheduling please call [phone].  If a Mammogram was ordered for you at [facility] call [phone] to schedule or change your appointment.  If you had an XRay/CT/Ultrasound/MRI ordered the number is [phone] to schedule or change your radiology appointment.   Medical Concerns:  If you have urgent medical concerns please call [phone] at any time of the day.      Thank you for coming to [facility]!  - If you had lab testing today and your results are normal they will be be mailed to you or sent to your MyChart within seven days.   - If the lab tests need quick action we will call you with the results.  - If any referrals were ordered today you should be getting a call in the next week.  If you don't hear about this within a week please call one of the following numbers       --f your referral is for [facility] please call [phone]    --If your referral is to outside [facility] please call the clinic number ([phone]) and ask for your team care coordinator.  - If you need any refills please call your pharmacy and they will contact us.  - If you have any concerns about today's visit or wish to schedule another appointment please call our office [phone] (8-5:00 M-F)  - If you have urgent medical concerns call [phone] at any time of the day.  - If you have a medical emergency please call 447.    Because you are pregnant, we have additional resources for you:  - You may call Noemi Cochran, our OB coordinator at [phone] during normal business hours, for non-urgent questions about your pregnancy.  - Immediate OB help is also available 24 hours a day, seven days a week via the  [facility] at [phone].    Prenatal Reminders:  Before 14 weeks: dating ultrasound       This ultrasound helps us determine your dates accurately.  15 - 18 weeks: Optional genetic testing (quad screen)       This testing helps understand your baby's risk for some genetic abnormalities.  22 - 24 weeks: Growth ultrasound (fetal survey)       This testing will look for early growth abnormalities, and might tell the baby's sex.  24 - 28 weeks: One hour sugar test (GCT)        This test helps identify diabetes of pregnancy.  27 - 36 weeks: Tetanus shot (Tdap)       This shot helps protect you and your baby from tetanus and whooping cough.  36 weeks and later: Group B Strep test (GBS)       This test helps predict if you need antibiotics in labor to prevent infection in your baby.  Anytime September to April: flu shot       This shot helps protect you and your family from the flu.  This is especially important during pregnancy!  Once every trimester: blood iron test (hemoglobin)       This test helps us know if you will need extra iron to support your baby.    At any time during or after your pregnancy: Some women experience baby blues.  We can help you with:  ? Feeling anxious, ? Overwhelmed or sad       ? Trouble sleeping ? Crying uncontrollably     ? Trouble caring for yourself or baby.    Again, thank you for choosing [facility].  Please let us know how we can best partner with you to improve your and your family's health.          Follow-ups after your visit        Who to contact     Please call your clinic at [phone] to:    Ask questions about your health    Make or cancel appointments    Discuss your medicines    Learn about your test results    Speak to your doctor   If you have compliments or concerns about an experience at your clinic, or if you wish to file a complaint, please contact [facility] Patient Relations at [phone] or email us at  "[email or web address]         Additional Information About Your Visit        [employer] Information     Prediculous is an electronic gateway that provides easy, online access to your medical records. With Prediculous, you can request a clinic appointment, read your test results, renew a prescription or communicate with your care team.     To sign up for Prediculous visit the website at [email or web address]   You will be asked to enter the access code listed below, as well as some personal information. Please follow the directions to create your username and password.     Your access code is: [email or web address]  Expires: 2017  4:11 PM     Your access code will  in 90 days. If you need help or a new code, please contact your [facility] or call [phone] for assistance.        Care EveryWhere ID     This is your Care EveryWhere ID. This could be used by other organizations to access your [facility] medical records  [id number]        Your Vitals Were     Pulse Temperature Respirations Height Last Period Pulse Oximetry    70 97.9  F (36.6  C) (Oral) 18 5' 2.5\" (158.8 cm) 2017 (Exact Date) 100%    BMI (Body Mass Index)                   32.9 kg/m2            Blood Pressure from Last 3 Encounters:   17 126/82   17 105/73   17 128/84    Weight from Last 3 Encounters:   17 182 lb 12.8 oz (82.9 kg)   17 183 lb 6.4 oz (83.2 kg)   17 182 lb (82.6 kg)              Today, you had the following     No orders found for display         Today's Medication Changes          These changes are accurate as of: 3/27/17  2:10 PM.  If you have any questions, ask your nurse or doctor.               Start taking these medicines.        Dose/Directions    SEA-BAND ADULT Belkys   Used for:  Nausea   Started by:  Bhavna Mena MD        Dose:  1 Device   1 Device as needed   Quantity:  1 each   Refills:  0         These medicines have changed or have " updated prescriptions.        Dose/Directions    * cholecalciferol 1000 UNIT tablet   Commonly known as:  vitamin D   This may have changed:  Another medication with the same name was added. Make sure you understand how and when to take each.   Used for:  History of vitamin D deficiency   Changed by:  Sonam Veloz APRN CNP        Dose:  1000 Units   Take 1 tablet (1,000 Units) by mouth daily   Quantity:  100 tablet   Refills:  3       * cholecalciferol 1000 UNIT tablet   Commonly known as:  vitamin D   This may have changed:  You were already taking a medication with the same name, and this prescription was added. Make sure you understand how and when to take each.   Used for:  Vitamin D deficiency   Changed by:  Bhavna Mena MD        Dose:  1000 Units   Take 1 tablet (1,000 Units) by mouth daily   Quantity:  100 tablet   Refills:  1       * Notice:  This list has 2 medication(s) that are the same as other medications prescribed for you. Read the directions carefully, and ask your doctor or other care provider to review them with you.         Where to get your medicines      These medications were sent to [facility] [place], [place] - [address] [facility] [address]     Phone:  [phone]     cholecalciferol 1000 UNIT tablet    SEA-BAND ADULT Belkys                Primary Care Provider Office Phone # Fax #    [facility] [phone]       [address] [facility] [address]        Thank you!     Thank you for choosing [facility]  for your care. Our goal is always to provide you with excellent care. Hearing back from our patients is one way we can continue to improve our services. Please take a few minutes to complete the written survey that you may receive in the mail after your visit with us. Thank you!             Your Updated Medication List - Protect others around you: Learn how to safely use, store and throw  away your medicines at www.disposemymeds.org.          This list is accurate as of: 3/27/17  2:10 PM.  Always use your most recent med list.                   Brand Name Dispense Instructions for use    calcium carbonate 500 MG chewable tablet    TUMS    150 tablet    Take 2 tablets (1,000 mg) by mouth 3 times daily as needed for heartburn       * cholecalciferol 1000 UNIT tablet    vitamin D    100 tablet    Take 1 tablet (1,000 Units) by mouth daily       * cholecalciferol 1000 UNIT tablet    vitamin D    100 tablet    Take 1 tablet (1,000 Units) by mouth daily       doxylamine 25 MG Tabs tablet    UNISOM    28 each    Take 1 tablet (25 mg) by mouth At Bedtime       ondansetron 4 MG tablet    ZOFRAN    18 tablet    Take 1 tablet (4 mg) by mouth every 8 hours as needed for nausea       PRENATAL 19 Chew     90 tablet    Take 1 tablet by mouth daily       pyridOXINE 25 MG tablet    vitamin B-6    28 tablet    Take 1 tablet (25 mg) by mouth daily       ranitidine 300 MG tablet    ZANTAC    30 tablet    Take 1 tablet (300 mg) by mouth At Bedtime       SEA-BAND ADULT Belkys     1 each    1 Device as needed       TYLENOL PO      Take 650 mg by mouth as needed for mild pain or fever Reported on 3/8/2017       * Notice:  This list has 2 medication(s) that are the same as other medications prescribed for you. Read the directions carefully, and ask your doctor or other care provider to review them with you.

## 2017-04-17 ENCOUNTER — OFFICE VISIT (OUTPATIENT)
Dept: FAMILY MEDICINE | Facility: CLINIC | Age: 37
End: 2017-04-17

## 2017-04-17 VITALS
BODY MASS INDEX: 32.53 KG/M2 | SYSTOLIC BLOOD PRESSURE: 101 MMHG | RESPIRATION RATE: 18 BRPM | WEIGHT: 183.6 LBS | HEIGHT: 63 IN | HEART RATE: 96 BPM | DIASTOLIC BLOOD PRESSURE: 67 MMHG | OXYGEN SATURATION: 96 % | TEMPERATURE: 98.3 F

## 2017-04-17 DIAGNOSIS — K21.9 GASTROESOPHAGEAL REFLUX DISEASE WITHOUT ESOPHAGITIS: ICD-10-CM

## 2017-04-17 DIAGNOSIS — Z34.90 PREGNANCY, UNSPECIFIED GESTATIONAL AGE: ICD-10-CM

## 2017-04-17 DIAGNOSIS — Z32.01 PREGNANCY EXAMINATION OR TEST, POSITIVE RESULT: ICD-10-CM

## 2017-04-17 DIAGNOSIS — E55.9 VITAMIN D DEFICIENCY: Primary | ICD-10-CM

## 2017-04-17 DIAGNOSIS — Z98.891 S/P REPEAT LOW TRANSVERSE C-SECTION: ICD-10-CM

## 2017-04-17 RX ORDER — PNV NO.118/IRON FUMARATE/FA 29 MG-1 MG
1 TABLET,CHEWABLE ORAL DAILY
Qty: 90 TABLET | Refills: 3 | Status: SHIPPED | OUTPATIENT
Start: 2017-04-17 | End: 2017-11-02

## 2017-04-17 NOTE — PROGRESS NOTES
"Return OB visit  12-23 weeks    Subjective:   Marti is a 37 year old  female at 12w4d who returns for prenatal care. STALIN Oct 26, 2017. She is status post 2 VSD (first 2 pregnancies) and 3 C-sections (: at 43 weeks after developing hypertension after ITN resulting in fetal bradycardia down to the 60s, which have returned in the baseline 130s, repetitively decelerations and an arrest of dilation 8 cm; : repeated c-s at 41+1;  repeated c-s)  - Concerns today: none. Patient is wondering about her Vit D level. She is also wondering if  is possible.   - Patient reports no vaginal bleeding, no contractions/severe cramping, no leakage of fluid. Fetal movement is not present yet.   - No nausea/vomiting. No heartburn.   - No vaginal discharge. No dysuria.   - No headache, vision changes, lower extremity swelling, upper abdominal pain, chest pain, shortness of breath.   - Mood has been good.     Objective:   /67  Pulse 96  Temp 98.3  F (36.8  C) (Oral)  Resp 18  Ht 5' 2.5\" (158.8 cm)  Wt 183 lb 9.6 oz (83.3 kg)  LMP 2017  SpO2 96%  BMI 33.05 kg/m2   Const: No distress  Abd: Gravid.   See flowsheet for FH, FHTs, edema     Prenatal labs:   -   Hemoglobin   Date Value Ref Range Status   2017 13.0 11.7 - 15.7 g/dL Final       Assessment & plan:   IUP at 12w4d weeks by LMP and is consistent with first trimester ultrasound.     - Prenatal labs reviewed: no anemia  - Pregnancy complicated by: repeated c-s, multipara  - Discussed quad screen. Patient does not want to proceed with screening.   - Discussed and ordered ultrasound for fetal survey - order placed. Advised patient to schedule an appointment in 8 weeks.   - Discussed screening for gestational diabetes at 24-28 weeks.  - Pregnancy weight gain has been adequate.   - Provided counseling regarding  labor symptoms, depression and social support systems, breast feeding, contraception options after delivery. The patient plans " to deliver at [facility] with myself and/or OB partner Dr Hopkins.     Breastfeeding education provided:  - Cue Feeding  - Supply and Demand  - Exclusivity   - Good Latch  - Avoiding Artifical Nipples      - Patient will continue taking prenatal vitamins.   - Return to clinic in 4 weeks.    - Specific concerns addressed today:   --- Discussed that patient will have a repeated c-s however offered to calculate TOLAC success.  --- Vitamin D deficiency: patient is currently taking 1000 units Vit D daily - advised to take 4000 units daily (a study on 4,000 IU Vitamin D given daily to pregnant women showed, furthermore, that this was completely safe and cut the risk of infections and  birth in half.)     Options for treatment and follow-up care were reviewed with the patient and/or guardian. Marti Vale and/or guardian engaged in the decision making process and verbalized understanding of the options discussed and agreed with the final plan.    Saundra Pierre MD  [facility],  G2 Family Medicine Resident  #[id number]

## 2017-04-17 NOTE — MR AVS SNAPSHOT
After Visit Summary   2017    Marti Vale    MRN: [id number]           Patient Information     Date Of Birth          1980        Visit Information        Provider Department      2017 1:00 PM Monica Pierre MD \[facility]\"" [facility]        Today's Diagnoses     Vitamin D deficiency    -  1    Pregnancy examination or test, positive result        Gastroesophageal reflux disease without esophagitis        Pregnancy, unspecified gestational age          Care Instructions    Here is the plan from today's visit    1. Pregnancy:  - Prenatal Vit-Fe Fumarate-FA (PRENATAL 19) CHEW; Take 1 tablet by mouth daily  Dispense: 90 tablet; Refill: 3  - we will discuss  versus vaginal birth at the next appointment    2. Gastroesophageal reflux disease without esophagitis  - ranitidine (ZANTAC) 300 MG tablet; Take 1 tablet (300 mg) by mouth At Bedtime  Dispense: 30 tablet; Refill: 1    3. Vitamin D deficiency  - Cholecalciferol 4000 UNITS CAPS; Take 4,000 Units by mouth daily  Dispense: 30 capsule; Refill: 3  - if not covered by insurance please buy over the counter   4. Pregnancy, unspecified gestational age  - US OB > 14 Weeks Complete Single; Future - schedule an appointment within 8 weeks (around 20 weeks)    Please call or return to clinic if your symptoms don't go away.    Follow up plan  Please make a clinic appointment for follow up with me (MONICA PIERRE) in 4  weeks for return OB appoitment.    Thank you for coming to [facility] today.  Lab Testing:  **If you had lab testing today and your results are reassuring or normal they will be mailed to you or sent through Primocare within 7 days.   **If the lab tests need quick action we will call you with the results.  The phone number we will call with results is # [phone] (home) . If this is not the best number please call our clinic and change the number.  Medication Refills:  If you need any refills  please call your pharmacy and they will contact us.   If you need to  your refill at a new pharmacy, please contact the new pharmacy directly. The new pharmacy will help you get your medications transferred faster.   Scheduling:  If you have any concerns about today's visit or wish to schedule another appointment please call our office during normal business hours [phone] (8-5:00 M-F)  If a referral was made to a [facility] and you don't get a call from central scheduling please call [phone].  If a Mammogram was ordered for you at [facility] call [phone] to schedule or change your appointment.  If you had an XRay/CT/Ultrasound/MRI ordered the number is [phone] to schedule or change your radiology appointment.   Medical Concerns:  If you have urgent medical concerns please call [phone] at any time of the day.          Follow-ups after your visit        Future tests that were ordered for you today     Open Future Orders        Priority Expected Expires Ordered    US OB > 14 Weeks Complete Single Routine  4/17/2018 4/17/2017            Who to contact     Please call your clinic at [phone] to:    Ask questions about your health    Make or cancel appointments    Discuss your medicines    Learn about your test results    Speak to your doctor   If you have compliments or concerns about an experience at your clinic, or if you wish to file a complaint, please contact [facility] Patient Relations at [phone] or email us at [email or web address]         Additional Information About Your Visit        [employer] Information     SummitIG is an electronic gateway that provides easy, online access to your medical records. With SummitIG, you can request a clinic appointment, read your test results, renew a prescription or communicate with your care team.     To sign up for [employer] visit the website at [email or web address]  "  You will be asked to enter the access code listed below, as well as some personal information. Please follow the directions to create your username and password.     Your access code is: [email or web address]  Expires: 2017  4:11 PM     Your access code will  in 90 days. If you need help or a new code, please contact your [facility] or call [phone] for assistance.        Care EveryWhere ID     This is your Care EveryWhere ID. This could be used by other organizations to access your [facility] medical records  [id number]        Your Vitals Were     Pulse Temperature Respirations Height Last Period Pulse Oximetry    96 98.3  F (36.8  C) (Oral) 18 5' 2.5\" (158.8 cm) 2017 96%    BMI (Body Mass Index)                   33.05 kg/m2            Blood Pressure from Last 3 Encounters:   17 101/67   17 126/82   17 105/73    Weight from Last 3 Encounters:   17 183 lb 9.6 oz (83.3 kg)   17 182 lb 12.8 oz (82.9 kg)   17 183 lb 6.4 oz (83.2 kg)                 Today's Medication Changes          These changes are accurate as of: 17  1:42 PM.  If you have any questions, ask your nurse or doctor.               Start taking these medicines.        Dose/Directions    Cholecalciferol 4000 UNITS Caps   Used for:  Vitamin D deficiency   Replaces:  cholecalciferol 1000 UNIT tablet   Started by:  Saundra Pierre MD        Dose:  4000 Units   Take 4,000 Units by mouth daily   Quantity:  30 capsule   Refills:  3         Stop taking these medicines if you haven't already. Please contact your care team if you have questions.     calcium carbonate 500 MG chewable tablet   Commonly known as:  TUMS   Stopped by:  Saundra Pierre MD           cholecalciferol 1000 UNIT tablet   Commonly known as:  vitamin D   Replaced by:  Cholecalciferol 4000 UNITS Caps   Stopped by:  Saundra Pierre MD           doxylamine 25 MG Tabs tablet   Commonly known as:  " UNISOM   Stopped by:  Saundra Pierre MD           ondansetron 4 MG tablet   Commonly known as:  ZOFRAN   Stopped by:  Saundra Pierre MD           pyridOXINE 25 MG tablet   Commonly known as:  vitamin B-6   Stopped by:  Saundra Pierre MD                Where to get your medicines      These medications were sent to [facility] [place], [place] - [address], [facility] [address]     Phone:  [phone]     Cholecalciferol 4000 UNITS Caps    PRENATAL 19 Chew    ranitidine 300 MG tablet                Primary Care Provider Office Phone # Fax #    Bhavna Milana Mena [phone]       [facility] [address]  [facility] [address]        Thank you!     Thank you for choosing [facility]  for your care. Our goal is always to provide you with excellent care. Hearing back from our patients is one way we can continue to improve our services. Please take a few minutes to complete the written survey that you may receive in the mail after your visit with us. Thank you!             Your Updated Medication List - Protect others around you: Learn how to safely use, store and throw away your medicines at www.disposemymeds.org.          This list is accurate as of: 4/17/17  1:42 PM.  Always use your most recent med list.                   Brand Name Dispense Instructions for use    Cholecalciferol 4000 UNITS Caps     30 capsule    Take 4,000 Units by mouth daily       PRENATAL 19 Chew     90 tablet    Take 1 tablet by mouth daily       ranitidine 300 MG tablet    ZANTAC    30 tablet    Take 1 tablet (300 mg) by mouth At Bedtime       SEA-BAND ADULT Belkys     1 each    1 Device as needed       TYLENOL PO      Take 650 mg by mouth as needed for mild pain or fever Reported on 3/8/2017

## 2017-04-17 NOTE — PATIENT INSTRUCTIONS
Here is the plan from today's visit    1. Pregnancy:  - Prenatal Vit-Fe Fumarate-FA (PRENATAL 19) CHEW; Take 1 tablet by mouth daily  Dispense: 90 tablet; Refill: 3  - we will discuss  versus vaginal birth at the next appointment    2. Gastroesophageal reflux disease without esophagitis  - ranitidine (ZANTAC) 300 MG tablet; Take 1 tablet (300 mg) by mouth At Bedtime  Dispense: 30 tablet; Refill: 1    3. Vitamin D deficiency  - Cholecalciferol 4000 UNITS CAPS; Take 4,000 Units by mouth daily  Dispense: 30 capsule; Refill: 3  - if not covered by insurance please buy over the counter   4. Pregnancy, unspecified gestational age  -  OB > 14 Weeks Complete Single; Future - schedule an appointment within 8 weeks (around 20 weeks)    Please call or return to clinic if your symptoms don't go away.    Follow up plan  Please make a clinic appointment for follow up with me (MONICA RAM) in 4  weeks for return OB appoitment.    Thank you for coming to [facility] today.  Lab Testing:  **If you had lab testing today and your results are reassuring or normal they will be mailed to you or sent through [employer] within 7 days.   **If the lab tests need quick action we will call you with the results.  The phone number we will call with results is # [phone] (home) . If this is not the best number please call our clinic and change the number.  Medication Refills:  If you need any refills please call your pharmacy and they will contact us.   If you need to  your refill at a new pharmacy, please contact the new pharmacy directly. The new pharmacy will help you get your medications transferred faster.   Scheduling:  If you have any concerns about today's visit or wish to schedule another appointment please call our office during normal business hours [phone] (8-5:00 M-F)  If a referral was made to a [facility] and you don't get a call from central scheduling please call  [phone].  If a Mammogram was ordered for you at [facility] call [phone] to schedule or change your appointment.  If you had an XRay/CT/Ultrasound/MRI ordered the number is [phone] to schedule or change your radiology appointment.   Medical Concerns:  If you have urgent medical concerns please call [phone] at any time of the day.

## 2017-04-17 NOTE — PROGRESS NOTES
Preceptor Attestation:   Patient seen and discussed with the resident. Assessment and plan reviewed with resident and agreed upon.   Supervising Physician:  Rainer Guerrero MD  [facility]

## 2017-04-19 PROBLEM — Z98.891 S/P REPEAT LOW TRANSVERSE C-SECTION: Status: ACTIVE | Noted: 2017-04-19

## 2017-05-03 PROBLEM — Z22.7 LATENT TUBERCULOSIS: Status: ACTIVE | Noted: 2017-05-03

## 2017-05-03 PROBLEM — A41.89 SEPSIS DUE TO OTHER ETIOLOGY (H): Status: ACTIVE | Noted: 2017-05-03

## 2017-05-03 PROBLEM — O03.9 COMPLETE ABORTION: Status: ACTIVE | Noted: 2017-05-03

## 2017-05-15 ENCOUNTER — OFFICE VISIT (OUTPATIENT)
Dept: FAMILY MEDICINE | Facility: CLINIC | Age: 37
End: 2017-05-15

## 2017-05-15 VITALS
SYSTOLIC BLOOD PRESSURE: 108 MMHG | TEMPERATURE: 98.5 F | OXYGEN SATURATION: 98 % | RESPIRATION RATE: 20 BRPM | WEIGHT: 186.2 LBS | DIASTOLIC BLOOD PRESSURE: 66 MMHG | BODY MASS INDEX: 33.51 KG/M2 | HEART RATE: 99 BPM

## 2017-05-15 DIAGNOSIS — O09.529 HIGH-RISK PREGNANCY, ELDERLY MULTIGRAVIDA, UNSPECIFIED TRIMESTER: Primary | ICD-10-CM

## 2017-05-15 PROBLEM — A41.89 SEPSIS DUE TO OTHER ETIOLOGY (H): Status: RESOLVED | Noted: 2017-05-03 | Resolved: 2017-05-15

## 2017-05-15 PROBLEM — O03.9 COMPLETE ABORTION: Status: RESOLVED | Noted: 2017-05-03 | Resolved: 2017-05-15

## 2017-05-15 NOTE — MR AVS SNAPSHOT
After Visit Summary   5/15/2017    Marti Vale    MRN: [id number]           Patient Information     Date Of Birth          1980        Visit Information        Provider Department      5/15/2017 1:20 PM Saundra Pierre MD [facility]        Today's Diagnoses     High-risk pregnancy, elderly multigravida, unspecified trimester    -  1       Follow-ups after your visit        Follow-up notes from your care team     Return in about 4 weeks (around 2017) for PHONG.      Your next 10 appointments already scheduled     2017 11:00 AM CDT   RETURN OB with [facility] ([facility])    [address],  [address]  [place] [address]   [phone]              Who to contact     Please call your clinic at [phone] to:    Ask questions about your health    Make or cancel appointments    Discuss your medicines    Learn about your test results    Speak to your doctor   If you have compliments or concerns about an experience at your clinic, or if you wish to file a complaint, please contact [facility] Patient Relations at [phone] or email us at [email or web address]         Additional Information About Your Visit        MyChart Information     Epost is an electronic gateway that provides easy, online access to your medical records. With [employer], you can request a clinic appointment, read your test results, renew a prescription or communicate with your care team.     To sign up for Epost visit the website at [email or web address]   You will be asked to enter the access code listed below, as well as some personal information. Please follow the directions to create your username and password.     Your access code is: [email or web address]  Expires: 2017  4:11 PM     Your access code will  in 90 days. If you need help or a new code, please contact your  [facility] or call [phone] for assistance.        Care EveryWhere ID     This is your Care EveryWhere ID. This could be used by other organizations to access your [place] medical records  [id number]        Your Vitals Were     Pulse Temperature Respirations Last Period Pulse Oximetry BMI (Body Mass Index)    99 98.5  F (36.9  C) (Oral) 20 01/19/2017 98% 33.51 kg/m2       Blood Pressure from Last 3 Encounters:   05/15/17 108/66   04/17/17 101/67   03/27/17 126/82    Weight from Last 3 Encounters:   05/15/17 186 lb 3.2 oz (84.5 kg)   04/17/17 183 lb 9.6 oz (83.3 kg)   03/27/17 182 lb 12.8 oz (82.9 kg)              Today, you had the following     No orders found for display       Primary Care Provider Office Phone # Fax #    Bhavna Mena [phone]       [facility] [address] [facility] [address]        Thank you!     Thank you for choosing [facility]  for your care. Our goal is always to provide you with excellent care. Hearing back from our patients is one way we can continue to improve our services. Please take a few minutes to complete the written survey that you may receive in the mail after your visit with us. Thank you!             Your Updated Medication List - Protect others around you: Learn how to safely use, store and throw away your medicines at www.disposemymeds.org.          This list is accurate as of: 5/15/17 11:59 PM.  Always use your most recent med list.                   Brand Name Dispense Instructions for use    Cholecalciferol 4000 UNITS Caps     30 capsule    Take 4,000 Units by mouth daily       PRENATAL 19 Chew     90 tablet    Take 1 tablet by mouth daily       ranitidine 300 MG tablet    ZANTAC    30 tablet    Take 1 tablet (300 mg) by mouth At Bedtime       SEA-BAND ADULT Belkys     1 each    1 Device as needed       TYLENOL PO      Take 650 mg by mouth as needed for mild pain or fever  Reported on 5/15/2017

## 2017-05-15 NOTE — PROGRESS NOTES
Return OB visit  12-23 weeks    Subjective:   Marti is a 37 year old  female at 16w4d who returns for prenatal care. STALIN Oct 26, 2017.  - Concerns today: none  - Patient reports no vaginal bleeding, no contractions/severe cramping, no leakage of fluid. Fetal movement is not present.   - Very mild nausea/vomiting. No heartburn.   - No vaginal discharge. No dysuria.   - No headache, vision changes, lower extremity swelling, upper abdominal pain, chest pain, shortness of breath.   - Mood has been good.    Objective:   /66  Pulse 99  Temp 98.5  F (36.9  C) (Oral)  Resp 20  Wt 186 lb 3.2 oz (84.5 kg)  LMP 2017  SpO2 98%  BMI 33.51 kg/m2   Const: No distress  Abd: Gravid.   See flowsheet for FH, FHTs, edema    exam and PAP smear declined today. Patient states that she will ne performing it during the next appointment.     Prenatal labs:   -   Hemoglobin   Date Value Ref Range Status   2017 13.0 11.7 - 15.7 g/dL Final       Assessment & plan:   IUP at 16w4d weeks by LMP and is consistent with first trimester ultrasound.     - Prenatal labs reviewed: no concerns  - Pregnancy complicated by: h/o 3 previous c-sections, last one in   - Discussed quad screen. Patient does not want to proceed with screening.   - Discussed and ordered ultrasound for fetal surveyL order is in place, patient will schedule an appoitment   - Pregnancy weight gain has been adequate.   - Provided counseling regarding  labor symptoms, depression and social support systems, breast feeding, contraception options after delivery. The patient plans to deliver at [facility] with OB partners Dr LUPE Mena and Dr Alcocer.     Breastfeeding education provided:  - Cue Feeding  - Supply and Demand  - Exclusivity   - Good Latch  - Avoiding Artifical Nipples      - Patient will continue taking prenatal vitamins.   - Return to clinic in 4 weeks.    - Specific concerns addressed today:   S/p 3x c-sections, last one  in . Discussed that this will be planned  again. Discussed possible tubal ligation since it will her her 4th  and she is at higher risk for uterine rupture, placenta percreta or accreta. Patient states that she will think about it but most likely she will have an IUD after delivery. She herself does not want other children.       Options for treatment and follow-up care were reviewed with the patient and/or guardian. Marti Vale and/or guardian engaged in the decision making process and verbalized understanding of the options discussed and agreed with the final plan.    Saundra Pierre MD  [facility],  G2 Family Medicine Resident  #[id number]

## 2017-05-15 NOTE — PROGRESS NOTES
Preceptor Attestation:   Patient seen and discussed with the resident. Assessment and plan reviewed with resident and agreed upon.   Supervising Physician:  Yeison Corley MD  [facility]

## 2017-05-30 ENCOUNTER — HOSPITAL ENCOUNTER (OUTPATIENT)
Dept: ULTRASOUND IMAGING | Facility: CLINIC | Age: 37
Discharge: HOME | End: 2017-05-30
Attending: FAMILY MEDICINE | Admitting: FAMILY MEDICINE
Payer: COMMERCIAL

## 2017-05-30 DIAGNOSIS — Z34.90 PREGNANCY, UNSPECIFIED GESTATIONAL AGE: ICD-10-CM

## 2017-05-30 PROCEDURE — 76805 OB US >/= 14 WKS SNGL FETUS: CPT

## 2017-06-13 ENCOUNTER — OFFICE VISIT (OUTPATIENT)
Dept: FAMILY MEDICINE | Facility: CLINIC | Age: 37
End: 2017-06-13

## 2017-06-13 VITALS
HEIGHT: 63 IN | TEMPERATURE: 98.2 F | DIASTOLIC BLOOD PRESSURE: 67 MMHG | BODY MASS INDEX: 33.66 KG/M2 | HEART RATE: 90 BPM | WEIGHT: 190 LBS | OXYGEN SATURATION: 99 % | SYSTOLIC BLOOD PRESSURE: 111 MMHG | RESPIRATION RATE: 16 BRPM

## 2017-06-13 DIAGNOSIS — O09.529 HIGH-RISK PREGNANCY, ELDERLY MULTIGRAVIDA, UNSPECIFIED TRIMESTER: Primary | ICD-10-CM

## 2017-06-13 NOTE — MR AVS SNAPSHOT
After Visit Summary   2017    Marti Vale    MRN: [id number]           Patient Information     Date Of Birth          1980        Visit Information        Provider Department      2017 11:00 AM Saundra Pierre MD [facility]        Today's Diagnoses     High-risk pregnancy, elderly multigravida, unspecified trimester    -  1       Follow-ups after your visit        Follow-up notes from your care team     Return in about 4 weeks (around 2017).      Your next 10 appointments already scheduled     2017  1:00 PM CDT   RETURN OB [facility] ([facility])    [address],  [address]  [place] [address]   [phone]              Who to contact     Please call your clinic at [phone] to:    Ask questions about your health    Make or cancel appointments    Discuss your medicines    Learn about your test results    Speak to your doctor   If you have compliments or concerns about an experience at your clinic, or if you wish to file a complaint, please contact [facility] Patient Relations at [phone] or email us at [email or web address]         Additional Information About Your Visit        MyChart Information     SoSociot is an electronic gateway that provides easy, online access to your medical records. With MeetMeTix, you can request a clinic appointment, read your test results, renew a prescription or communicate with your care team.     To sign up for SoSociot visit the website at [email or web address]   You will be asked to enter the access code listed below, as well as some personal information. Please follow the directions to create your username and password.     Your access code is: [email or web address]  Expires: 2017  7:05 AM     Your access code will  in 90 days. If you need help or a new code, please contact your [facility] or call [phone] for assistance.        Care EveryWhere ID     This is your Care EveryWhere ID. This could be used by other organizations to access your [place] medical records  [id number]        Your Vitals Were     Pulse Temperature Respirations Height Last Period Pulse Oximetry    90 98.2  F (36.8  C) (Oral) 16 5' 2.5\" (158.8 cm) 01/19/2017 99%    BMI (Body Mass Index)                   34.2 kg/m2            Blood Pressure from Last 3 Encounters:   06/13/17 111/67   05/15/17 108/66   04/17/17 101/67    Weight from Last 3 Encounters:   06/13/17 190 lb (86.2 kg)   05/15/17 186 lb 3.2 oz (84.5 kg)   04/17/17 183 lb 9.6 oz (83.3 kg)              Today, you had the following     No orders found for display       Primary Care Provider Office Phone # Fax #    Bhavna Mena [phone]       [facility] [address] [facility] [address]        Thank you!     Thank you for choosing [facility]  for your care. Our goal is always to provide you with excellent care. Hearing back from our patients is one way we can continue to improve our services. Please take a few minutes to complete the written survey that you may receive in the mail after your visit with us. Thank you!             Your Updated Medication List - Protect others around you: Learn how to safely use, store and throw away your medicines at www.disposemymeds.org.          This list is accurate as of: 6/13/17 11:59 PM.  Always use your most recent med list.                   Brand Name Dispense Instructions for use    Cholecalciferol 4000 UNITS Caps     30 capsule    Take 4,000 Units by mouth daily       PRENATAL 19 Chew     90 tablet    Take 1 tablet by mouth daily       ranitidine 300 MG tablet    ZANTAC    30 tablet    Take 1 tablet (300 mg) by mouth At Bedtime       SEA-BAND ADULT Belkys     1 each    1 Device as needed       TYLENOL PO      Take 650 mg by mouth " as needed for mild pain or fever Reported on 5/15/2017

## 2017-06-13 NOTE — LETTER
[facility]  [address],  [address]  [facility] [address]  [phone]    2017    Marti Vale  [address] [facility] [id number] [phone] (home)     : 1980      To Whom it may concern:    Marti Vale was seen in our Clinic today, 2017. Due to pregnancy state we strongly recommend to avoid bending and heavy lifting at work.       Sincerely,        Saundra Pierre. MD

## 2017-06-13 NOTE — PROGRESS NOTES
"Return OB visit  12-23 weeks    Subjective:   Marti is a 37 year old  female at 20w5d who returns for prenatal care. STALIN Oct 26, 2017.  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   - Patient reports no vaginal bleeding, no contractions/severe cramping, no leakage of fluid. Fetal movement is present.   - No nausea/vomiting. No heartburn.   - No vaginal discharge. No dysuria.   - No headache, vision changes, lower extremity swelling, upper abdominal pain, chest pain, shortness of breath.   - Mood has been good.    Objective:   /67  Pulse 90  Temp 98.2  F (36.8  C) (Oral)  Resp 16  Ht 5' 2.5\" (158.8 cm)  Wt 190 lb (86.2 kg)  LMP 2017  SpO2 99%  BMI 34.2 kg/m2   Const: No distress  Abd: Gravid.   See flowsheet for FH, FHTs, edema     Prenatal labs:   -   Hemoglobin   Date Value Ref Range Status   2017 13.0 11.7 - 15.7 g/dL Final       Assessment & plan:   IUP at 20w5d weeks by LMP and is consistent with first trimester ultrasound.     - Prenatal labs reviewed: yes, remarkable for Vitamin D deficiency, continue with 4000 units Vitamin D daily, plan to recheck Vitamin D level in September  - Pregnancy complicated by: s/p 2 c-sections  - Reviewed quad screen and fetal survey results with patient: normal results  - Discussed screening for gestational diabetes at 24-28 weeks.  - Pregnancy weight gain has been adequate.   - Provided counseling regarding  labor symptoms, depression and social support systems, breast feeding, contraception options after delivery. The patient plans to deliver per  at [facility] so patient will need a referral for it. Planned contraception: cupper IUD.     Breastfeeding education provided:  - Cue Feeding  - Supply and Demand  - Exclusivity   - Good Latch  - Avoiding Artifical Nipples      - Patient will continue taking prenatal vitamins and Vitamin D.   - Return to clinic in 4 weeks.    - Specific concerns " addressed today:   There are no diagnoses linked to this encounter.    Options for treatment and follow-up care were reviewed with the patient and/or guardian. Marti Vale and/or guardian engaged in the decision making process and verbalized understanding of the options discussed and agreed with the final plan.    Saundra Pierre MD  [facility],  G2 Family Medicine Resident  #[id number]

## 2017-06-13 NOTE — NURSING NOTE
name: Anabelle   Language: Guatemalan  Agency: [facility]  Phone number: [phone]      Arelis Christensen CMA

## 2017-06-14 NOTE — PROGRESS NOTES
Preceptor Attestation:   Patient seen and discussed with the resident. Assessment and plan reviewed with resident and agreed upon.   Supervising Physician:  Kalpesh Peters MD  [facility]

## 2017-06-28 ENCOUNTER — OFFICE VISIT (OUTPATIENT)
Dept: FAMILY MEDICINE | Facility: CLINIC | Age: 37
End: 2017-06-28

## 2017-06-28 VITALS
BODY MASS INDEX: 34.38 KG/M2 | HEIGHT: 63 IN | HEART RATE: 89 BPM | TEMPERATURE: 98.3 F | DIASTOLIC BLOOD PRESSURE: 68 MMHG | SYSTOLIC BLOOD PRESSURE: 96 MMHG | RESPIRATION RATE: 14 BRPM | WEIGHT: 194 LBS | OXYGEN SATURATION: 99 %

## 2017-06-28 DIAGNOSIS — G56.03 BILATERAL CARPAL TUNNEL SYNDROME: Primary | ICD-10-CM

## 2017-06-28 RX ORDER — ACETAMINOPHEN 325 MG/1
325-650 TABLET ORAL EVERY 4 HOURS PRN
Qty: 100 TABLET | Refills: 0 | Status: SHIPPED | OUTPATIENT
Start: 2017-06-28 | End: 2017-09-05

## 2017-06-28 NOTE — MR AVS SNAPSHOT
After Visit Summary   6/28/2017    Marti Vale    MRN: [id number]           Patient Information     Date Of Birth          1980        Visit Information        Provider Department      6/28/2017 4:20 PM Shakir Mena MD [facility]        Today's Diagnoses     Carpal tunnel syndrome of left wrist    -  1      Care Instructions    Here is the plan from today's visit    1. Carpal tunnel syndrome of left wrist  Wear your splint at night.  You most likely have carpal tunnel syndrome of the left wrist.  This should improve after delivery.      - acetaminophen (TYLENOL) 325 MG tablet; Take 1-2 tablets (325-650 mg) by mouth every 4 hours as needed for mild pain or fever Reported on 5/15/2017  Dispense: 100 tablet; Refill: 0  - ELASTIC WRIST SPLINT    Follow up plan  Please make a clinic appointment for follow up with me (SHAKIR MENA) as needed.  Follow up for your routine OB visits as scheduled.    Thank you for coming to [facility] today.  Lab Testing:  **If you had lab testing today and your results are reassuring or normal they will be mailed to you or sent through [email or web address] within 7 days.   **If the lab tests need quick action we will call you with the results.  The phone number we will call with results is # [phone] (home) . If this is not the best number please call our clinic and change the number.  Medication Refills:  If you need any refills please call your pharmacy and they will contact us.   If you need to  your refill at a new pharmacy, please contact the new pharmacy directly. The new pharmacy will help you get your medications transferred faster.   Scheduling:  If you have any concerns about today's visit or wish to schedule another appointment please call our office during normal business hours [phone] (8-5:00 M-F)  If a referral was made to a [facility] and you don't get a call from [place]  scheduling please call [phone].  If a Mammogram was ordered for you at [facility] call [phone] to schedule or change your appointment.  If you had an XRay/CT/Ultrasound/MRI ordered the number is [phone] to schedule or change your radiology appointment.   Medical Concerns:  If you have urgent medical concerns please call [phone] at any time of the day.            Follow-ups after your visit        Your next 10 appointments already scheduled     2017  1:00 PM CDT   RETURN OB with Saundra Pierre MD   [facility] ([facility])    [address],  [address]  [facility] [address]   [phone]              Who to contact     Please call your clinic at [phone] to:    Ask questions about your health    Make or cancel appointments    Discuss your medicines    Learn about your test results    Speak to your doctor   If you have compliments or concerns about an experience at your clinic, or if you wish to file a complaint, please contact [facility] Patient Relations at [phone] or email us at [email or web address]         Additional Information About Your Visit        [employer] Information     Storybirdt is an electronic gateway that provides easy, online access to your medical records. With [employer], you can request a clinic appointment, read your test results, renew a prescription or communicate with your care team.     To sign up for Storybirdt visit the website at [email or web address]   You will be asked to enter the access code listed below, as well as some personal information. Please follow the directions to create your username and password.     Your access code is: [email or web address]  Expires: 2017  7:05 AM     Your access code will  in 90 days. If you need help or a new code, please contact your [facility] or call [phone] for assistance.        Care EveryWhere  "ID     This is your Care EveryWhere ID. This could be used by other organizations to access your [facility] medical records  [id number]        Your Vitals Were     Pulse Temperature Respirations Height Last Period Pulse Oximetry    89 98.3  F (36.8  C) (Oral) 14 5' 2.5\" (158.8 cm) 01/19/2017 99%    BMI (Body Mass Index)                   34.92 kg/m2            Blood Pressure from Last 3 Encounters:   06/28/17 96/68   06/13/17 111/67   05/15/17 108/66    Weight from Last 3 Encounters:   06/28/17 194 lb (88 kg)   06/13/17 190 lb (86.2 kg)   05/15/17 186 lb 3.2 oz (84.5 kg)              We Performed the Following     ELASTIC WRIST SPLINT          Today's Medication Changes          These changes are accurate as of: 6/28/17  4:53 PM.  If you have any questions, ask your nurse or doctor.               These medicines have changed or have updated prescriptions.        Dose/Directions    acetaminophen 325 MG tablet   Commonly known as:  TYLENOL   This may have changed:    - medication strength  - how much to take  - when to take this   Used for:  Carpal tunnel syndrome of left wrist   Changed by:  Bhavna Mena MD        Dose:  325-650 mg   Take 1-2 tablets (325-650 mg) by mouth every 4 hours as needed for mild pain or fever Reported on 5/15/2017   Quantity:  100 tablet   Refills:  0            Where to get your medicines      These medications were sent to [facility] [place], [place] - [address] [place] [id number]     Phone:  [phone]     acetaminophen 325 MG tablet                Primary Care Provider Office Phone # Fax #    Bhavna Mena [phone]       [facility] [address] [facility] [address]        Equal Access to Services     CHEPE OBRIEN : Tanya Kelley, waaxda luqadaha, qaybta kaalmada rashmi, wesley antoine. So [facility] [phone].    ATENCIÓN: Si anne berman " andre disposición servicios gratuitos de asistencia lingüística. Jona ellsworth [phone].    We comply with applicable federal civil rights laws and [place] laws. We do not discriminate on the basis of race, color, national origin, age, disability sex, sexual orientation or gender identity.            Thank you!     Thank you for choosing [facility]  for your care. Our goal is always to provide you with excellent care. Hearing back from our patients is one way we can continue to improve our services. Please take a few minutes to complete the written survey that you may receive in the mail after your visit with us. Thank you!             Your Updated Medication List - Protect others around you: Learn how to safely use, store and throw away your medicines at www.disposemymeds.org.          This list is accurate as of: 6/28/17  4:53 PM.  Always use your most recent med list.                   Brand Name Dispense Instructions for use Diagnosis    acetaminophen 325 MG tablet    TYLENOL    100 tablet    Take 1-2 tablets (325-650 mg) by mouth every 4 hours as needed for mild pain or fever Reported on 5/15/2017    Carpal tunnel syndrome of left wrist       Cholecalciferol 4000 UNITS Caps     30 capsule    Take 4,000 Units by mouth daily    Vitamin D deficiency       PRENATAL 19 Chew     90 tablet    Take 1 tablet by mouth daily    Pregnancy examination or test, positive result       ranitidine 300 MG tablet    ZANTAC    30 tablet    Take 1 tablet (300 mg) by mouth At Bedtime    Gastroesophageal reflux disease without esophagitis       SEA-BAND ADULT Belkys     1 each    1 Device as needed    Nausea

## 2017-06-28 NOTE — PROGRESS NOTES
Preceptor Attestation:   Patient seen and discussed with the resident. Assessment and plan reviewed with resident and agreed upon.   Supervising Physician:  Elier Cooper MD  [facility]

## 2017-06-28 NOTE — PATIENT INSTRUCTIONS
Here is the plan from today's visit        Follow up plan  Please make a clinic appointment for follow up with me (SHAKIR ELLSWORTH) as needed.  Follow up for your routine OB visits as scheduled.    Thank you for coming to [facility] today.  Lab Testing:  **If you had lab testing today and your results are reassuring or normal they will be mailed to you or sent through [employer] within 7 days.   **If the lab tests need quick action we will call you with the results.  The phone number we will call with results is # [phone] (home) . If this is not the best number please call our clinic and change the number.  Medication Refills:  If you need any refills please call your pharmacy and they will contact us.   If you need to  your refill at a new pharmacy, please contact the new pharmacy directly. The new pharmacy will help you get your medications transferred faster.   Scheduling:  If you have any concerns about today's visit or wish to schedule another appointment please call our office during normal business hours [phone] (8-5:00 M-F)  If a referral was made to a [facility] and you don't get a call from central scheduling please call [phone].  If a Mammogram was ordered for you at [facility] call [phone] to schedule or change your appointment.  If you had an XRay/CT/Ultrasound/MRI ordered the number is [phone] to schedule or change your radiology appointment.   Medical Concerns:  If you have urgent medical concerns please call [phone] at any time of the day.

## 2017-06-28 NOTE — PROGRESS NOTES
"      HPI:       Marti Vale is a 37 year old who presents for the following  Patient presents with:  Numbness: bilateral arms Sx x 3 weeks     Marti is a 22-week pregnant female who presents to clinic because of persistent numbness in her hands that started around 2 months ago. She says she initially noticed it only at night and when she woke up in the morning, but in the past 3 weeks its been constant and is particularly worse in the left hand. She says it is a tingling, funny feeling. Does not perceive it as painful necessary. Specifically points to fingers 1-3 of the left hand as being the most \"funny\" feeling. Reports no weakness in her hands/fingers.   Has been taking Tylenol for it which does not seem to be helping much.   She was prescribed an acupressure device for N/V of pregnancy and was wearing it on the left wrist. She felt that it was tight and could have been causing the pain so she stopped wearing it.     A BYTEGRID  was used for  this visit.      Problem, Medication and Allergy Lists were reviewed and are current.  Patient is an established patient of this clinic.         Review of Systems:   Review of Systems   All other systems reviewed and are negative.            Physical Exam:   No data found.    There is no height or weight on file to calculate BMI.  Vitals were reviewed and were normal  Blood pressure 96/68, pulse 89, temperature 98.3  F (36.8  C), temperature source Oral, resp. rate 14, height 5' 2.5\" (158.8 cm), weight 194 lb (88 kg), last menstrual period 01/19/2017, SpO2 99 %, not currently breastfeeding.    Physical Exam   Constitutional: She is oriented to person, place, and time. She appears well-developed and well-nourished.   HENT:   Head: Normocephalic and atraumatic.   Musculoskeletal:   Normal ROM of right and left hands, elbows, and shoulders. No pain or tenderness on palpation of the fingers and wrists. Peripheral pulses intact and equal bilaterally. Tinel and " Phalens signs did not recreate numbness or tingling.    Neurological: She is alert and oriented to person, place, and time.       Results:   No new results.     Assessment and Plan   1. Carpal tunnel syndrome of left and right wrist  Discussed with Marti that the distribution of her numbness and tingling (in first three fingers) is indicative of carpal tunnel syndrome. Explained that this is common during pregnancy, as the excess fluid can often compress the nerve that runs through the wrist and supplies the first three fingers. She was given two wrist splints to wear at night. Tylenol was refilled and recommended to use as needed. Explained to Marti that this problem often resolves soon after pregnancy is over. She was encouraged to follow up with us if the pain persists or she is noticing any weakness of her hands and wrists.     Options for treatment and follow-up care were reviewed with the patient. Marti Vale  engaged in the decision making process and verbalized understanding of the options discussed and agreed with the final plan.    I, Huong Llanes am acting as scribe for Dr. Bhavna Mena MD.    I agree with note above.    Bhavna Mena M.D.  PGY-3, Family Medicine

## 2017-07-11 ENCOUNTER — OFFICE VISIT (OUTPATIENT)
Dept: FAMILY MEDICINE | Facility: CLINIC | Age: 37
End: 2017-07-11

## 2017-07-11 VITALS
SYSTOLIC BLOOD PRESSURE: 116 MMHG | HEART RATE: 81 BPM | WEIGHT: 194.6 LBS | OXYGEN SATURATION: 97 % | BODY MASS INDEX: 35.03 KG/M2 | DIASTOLIC BLOOD PRESSURE: 74 MMHG | RESPIRATION RATE: 18 BRPM | TEMPERATURE: 98.1 F

## 2017-07-11 DIAGNOSIS — O26.899 CARPAL TUNNEL SYNDROME DURING PREGNANCY: ICD-10-CM

## 2017-07-11 DIAGNOSIS — G56.00 CARPAL TUNNEL SYNDROME DURING PREGNANCY: ICD-10-CM

## 2017-07-11 DIAGNOSIS — E55.9 VITAMIN D DEFICIENCY: Primary | ICD-10-CM

## 2017-07-11 DIAGNOSIS — K21.9 GASTROESOPHAGEAL REFLUX DISEASE WITHOUT ESOPHAGITIS: ICD-10-CM

## 2017-07-11 DIAGNOSIS — O09.529 HIGH-RISK PREGNANCY, ELDERLY MULTIGRAVIDA, UNSPECIFIED TRIMESTER: ICD-10-CM

## 2017-07-11 DIAGNOSIS — K59.01 SLOW TRANSIT CONSTIPATION: ICD-10-CM

## 2017-07-11 RX ORDER — SENNOSIDES A AND B 8.6 MG/1
1 TABLET, FILM COATED ORAL DAILY
Qty: 30 TABLET | Refills: 0 | Status: SHIPPED | OUTPATIENT
Start: 2017-07-11 | End: 2017-09-20

## 2017-07-11 NOTE — PATIENT INSTRUCTIONS
Here is the plan from today's visit     High-risk pregnancy, elderly multigravida, unspecified trimester  - Glucose tolerance gest screen 1 hour: patient was advised to schedule lab only appointment  - follow up in 4 weeks    Vitamin D deficiency  - continue to take Vit D supplement 4000 units daily    Gastroesophageal reflux disease without esophagitis  - ranitidine (ZANTAC) 300 MG tablet; Take 1 tablet (300 mg) by mouth At Bedtime  Dispense: 30 tablet; Refill: 1    Carpal tunnel syndrome during pregnancy   - supportive care at this point  - consider more aggressive treatment if persist.       Please call or return to clinic if your symptoms don't go away.    Follow up plan  Please make a clinic appointment for follow up with me (MONICA RAM) in 4  weeks for return OB appointment.    Thank you for coming to [facility] today.  Lab Testing:  **If you had lab testing today and your results are reassuring or normal they will be mailed to you or sent through [employer] within 7 days.   **If the lab tests need quick action we will call you with the results.  The phone number we will call with results is # [phone] (home) . If this is not the best number please call our clinic and change the number.  Medication Refills:  If you need any refills please call your pharmacy and they will contact us.   If you need to  your refill at a new pharmacy, please contact the new pharmacy directly. The new pharmacy will help you get your medications transferred faster.   Scheduling:  If you have any concerns about today's visit or wish to schedule another appointment please call our office during normal business hours [phone] (8-5:00 M-F)  If a referral was made to a [facility] and you don't get a call from central scheduling please call [phone].  If a Mammogram was ordered for you at [facility] call [phone] to schedule or change your appointment.  If you had an  XRay/CT/Ultrasound/MRI ordered the number is [phone] to schedule or change your radiology appointment.   Medical Concerns:  If you have urgent medical concerns please call [phone] at any time of the day.

## 2017-07-11 NOTE — PROGRESS NOTES
Preceptor Attestation:   Patient seen and discussed with the resident. Assessment and plan reviewed with resident and agreed upon.   Supervising Physician:  Bhavna Calles MD  [facility]

## 2017-07-11 NOTE — PROGRESS NOTES
Return OB visit  24-28 weeks    Subjective:   Marti is a 37 year old  female at 24w5d who returns for prenatal care. STALIN Oct 26, 2017.  - Concerns today: worsening heart burn, taking Zantac over the counter 300 mg twice daily. Also complaining about numbness in the thumb, index and middle finger b/l, more pronounced on the let. This is concerning carpal tunnel syndrome associated with pregnancy.   - Patient reports no vaginal bleeding, no contractions, no leakage of fluid. Fetal movement is present.   - No nausea/vomiting. No heartburn.   - No vaginal discharge. No dysuria.   - No headache, vision changes, lower extremity swelling, upper abdominal pain, chest pain, shortness of breath.   - Mood has been good.    Objective:   /74  Pulse 81  Temp 98.1  F (36.7  C) (Oral)  Resp 18  Wt 194 lb 9.6 oz (88.3 kg)  LMP 2017  SpO2 97%  BMI 35.03 kg/m2   Const: No distress  Abd: Gravid.   See flowsheet for FH, FHTs, edema.    Prenatal labs:   Hemoglobin   Date Value Ref Range Status   2017 13.0 11.7 - 15.7 g/dL Final       Assessment & plan:   IUP at 24w5d weeks by LMP and is consistent with first trimester ultrasound.     - Pregnancy complicated by: carpal tunnel syndrome  - Prenatal labs reviewed. Patient is Rh positive: antibody re-screen and rhogam is therefore not indicated. Second trimester hemoglobin was not ordered due previous normal value. Pleas to retest in 3 trimester.    - Discussed screening for gestational diabetes. 1 hour GCT will be obtained next days.  - Pregnancy weight gain has been adequate.   - Provided counseling regarding  labor symptoms, depression and social support systems, breast feeding.  - Patient will continue taking prenatal vitamins and avoiding cigarettes & alcohol.   -Breastfeeding education provided:  - Cue Feeding  - Supply and Demand  - Exclusivity   - Good Latch  - Avoiding Artifical Nipples    - Return to clinic in 4 weeks.     - Specific concerns  addressed today:     High-risk pregnancy, elderly multigravida, unspecified trimester  - Glucose tolerance gest screen 1 hour: patient was advised to schedule lab only appointment  - follow up in 4 weeks    Vitamin D deficiency  - continue to take Vit D supplement 4000 units daily    Gastroesophageal reflux disease without esophagitis  - ranitidine (ZANTAC) 300 MG tablet; Take 1 tablet (300 mg) by mouth At Bedtime  Dispense: 30 tablet; Refill: 1    Carpal tunnel syndrome during pregnancy   - supportive care at this point  - consider more aggressive treatment if persist.         Options for treatment and follow-up care were reviewed with the patient and/or guardian. Marti Vale and/or guardian engaged in the decision making process and verbalized understanding of the options discussed and agreed with the final plan.    Saundra Pierre MD  [facility],  G2 Family Medicine Resident  #[id number]

## 2017-07-11 NOTE — MR AVS SNAPSHOT
After Visit Summary   7/11/2017    Marti Vale    MRN: [id number]           Patient Information     Date Of Birth          1980        Visit Information        Provider Department      7/11/2017 1:00 PM Monica Pierre MD [facility]        Today's Diagnoses     Vitamin D deficiency    -  1    High-risk pregnancy, elderly multigravida, unspecified trimester        Gastroesophageal reflux disease without esophagitis        Carpal tunnel syndrome during pregnancy        Slow transit constipation          Care Instructions    Here is the plan from today's visit     High-risk pregnancy, elderly multigravida, unspecified trimester  - Glucose tolerance gest screen 1 hour: please schedule lab only appointment  - follow up in 4 weeks    Vitamin D deficiency  - continue to take Vit D supplement 4000 units daily    Gastroesophageal reflux disease without esophagitis  - ranitidine (ZANTAC) 300 MG tablet; Take 1 tablet (300 mg) by mouth At Bedtime  Dispense: 30 tablet; Refill: 1    Carpal tunnel syndrome during pregnancy   - it will go away as soon as you deliver your baby      Please call or return to clinic if your symptoms don't go away.    Follow up plan  Please make a clinic appointment for follow up with me (MONICA PIERRE) in 4  weeks for return OB appointment.    Thank you for coming to [facility] today.  Lab Testing:  **If you had lab testing today and your results are reassuring or normal they will be mailed to you or sent through Mocapay within 7 days.   **If the lab tests need quick action we will call you with the results.  The phone number we will call with results is # [phone] (home) . If this is not the best number please call our clinic and change the number.  Medication Refills:  If you need any refills please call your pharmacy and they will contact us.   If you need to  your refill at a new pharmacy, please contact the new pharmacy  directly. The new pharmacy will help you get your medications transferred faster.   Scheduling:  If you have any concerns about today's visit or wish to schedule another appointment please call our office during normal business hours [phone] (8-5:00 M-F)  If a referral was made to a [facility] and you don't get a call from central scheduling please call [phone].  If a Mammogram was ordered for you at [facility] call [phone] to schedule or change your appointment.  If you had an XRay/CT/Ultrasound/MRI ordered the number is [phone] to schedule or change your radiology appointment.   Medical Concerns:  If you have urgent medical concerns please call [phone] at any time of the day.            Follow-ups after your visit        Who to contact     Please call your clinic at [phone] to:    Ask questions about your health    Make or cancel appointments    Discuss your medicines    Learn about your test results    Speak to your doctor   If you have compliments or concerns about an experience at your clinic, or if you wish to file a complaint, please contact [facility] Patient Relations at [phone] or email us at [email or web address]         Additional Information About Your Visit        Revision3hart Information     Myrio is an electronic gateway that provides easy, online access to your medical records. With Myrio, you can request a clinic appointment, read your test results, renew a prescription or communicate with your care team.     To sign up for HeadSense Medicalt visit the website at [email or web address]   You will be asked to enter the access code listed below, as well as some personal information. Please follow the directions to create your username and password.     Your access code is: [email or web address]  Expires: 2017  7:05 AM     Your access code will  in 90 days. If you need help or a new code, please contact  your [facility] or call [phone] for assistance.        Care EveryWhere ID     This is your Care EveryWhere ID. This could be used by other organizations to access your [facility] medical records  [id number]        Your Vitals Were     Pulse Temperature Respirations Last Period Pulse Oximetry BMI (Body Mass Index)    81 98.1  F (36.7  C) (Oral) 18 01/19/2017 97% 35.03 kg/m2       Blood Pressure from Last 3 Encounters:   07/11/17 116/74   06/28/17 96/68   06/13/17 111/67    Weight from Last 3 Encounters:   07/11/17 194 lb 9.6 oz (88.3 kg)   06/28/17 194 lb (88 kg)   06/13/17 190 lb (86.2 kg)              We Performed the Following     Glucose tolerance gest screen 1 hour          Today's Medication Changes          These changes are accurate as of: 7/11/17  1:36 PM.  If you have any questions, ask your nurse or doctor.               Start taking these medicines.        Dose/Directions    senna 8.6 MG tablet   Commonly known as:  SENOKOT   Used for:  Slow transit constipation        Dose:  1 tablet   Take 1 tablet by mouth daily   Quantity:  30 tablet   Refills:  0            Where to get your medicines      These medications were sent to [facility] [place], [place] - [address] [place] [id number]     Phone:  [phone]     ranitidine 300 MG tablet    senna 8.6 MG tablet                Primary Care Provider Office Phone # Fax #    Bhavna Mena [phone]       [facility] [address] [facility] [address]        Equal Access to Services     CHEPE OBRIEN AH: Hadii ehsan singh Sochiquita, waaxda luqadaha, qaybta kaalmawesley lundberg. So [facility] [phone].    ATENCIÓN: Si habla español, tiene a andre disposición servicios gratuitos de asistencia lingüística. Llame al [phone].    We comply with applicable federal civil rights laws and [place] laws. We do not  discriminate on the basis of race, color, national origin, age, disability sex, sexual orientation or gender identity.            Thank you!     Thank you for choosing [facility]  for your care. Our goal is always to provide you with excellent care. Hearing back from our patients is one way we can continue to improve our services. Please take a few minutes to complete the written survey that you may receive in the mail after your visit with us. Thank you!             Your Updated Medication List - Protect others around you: Learn how to safely use, store and throw away your medicines at www.disposemymeds.org.          This list is accurate as of: 7/11/17  1:36 PM.  Always use your most recent med list.                   Brand Name Dispense Instructions for use Diagnosis    acetaminophen 325 MG tablet    TYLENOL    100 tablet    Take 1-2 tablets (325-650 mg) by mouth every 4 hours as needed for mild pain or fever Reported on 5/15/2017        Cholecalciferol 4000 UNITS Caps     30 capsule    Take 4,000 Units by mouth daily    Vitamin D deficiency       PRENATAL 19 Chew     90 tablet    Take 1 tablet by mouth daily    Pregnancy examination or test, positive result       ranitidine 300 MG tablet    ZANTAC    30 tablet    Take 1 tablet (300 mg) by mouth At Bedtime    Gastroesophageal reflux disease without esophagitis       SEA-BAND ADULT Belkys     1 each    1 Device as needed    Nausea       senna 8.6 MG tablet    SENOKOT    30 tablet    Take 1 tablet by mouth daily    Slow transit constipation

## 2017-07-12 DIAGNOSIS — O09.529 HIGH-RISK PREGNANCY, ELDERLY MULTIGRAVIDA, UNSPECIFIED TRIMESTER: ICD-10-CM

## 2017-07-12 DIAGNOSIS — R73.09 ELEVATED GLUCOSE TOLERANCE TEST: Primary | ICD-10-CM

## 2017-07-12 DIAGNOSIS — O09.529 SUPERVISION OF HIGH-RISK PREGNANCY OF ELDERLY MULTIGRAVIDA: ICD-10-CM

## 2017-07-12 LAB — GLU GEST SCREEN 1HR 50G: 175 (ref 0–129)

## 2017-07-13 NOTE — PROGRESS NOTES
Results discussed directly with patient while patient was present. Any further details documented in the note.   Bhavna Mena MD

## 2017-07-19 DIAGNOSIS — R73.09 ELEVATED GLUCOSE TOLERANCE TEST: ICD-10-CM

## 2017-07-19 DIAGNOSIS — O09.529 SUPERVISION OF HIGH-RISK PREGNANCY OF ELDERLY MULTIGRAVIDA: ICD-10-CM

## 2017-07-19 LAB
GLU, 1 HOUR, 100 G: 161 MG/DL
GLU, 2 HOUR, 100 G: 165 MG/DL
GLU, 3 HOUR, 100 G: 121 MG/DL
GLUCOSE P FAST SERPL-MCNC: 79 MG/DL

## 2017-07-29 ENCOUNTER — HEALTH MAINTENANCE LETTER (OUTPATIENT)
Age: 37
End: 2017-07-29

## 2017-09-05 ENCOUNTER — OFFICE VISIT (OUTPATIENT)
Dept: FAMILY MEDICINE | Facility: CLINIC | Age: 37
End: 2017-09-05

## 2017-09-05 VITALS
BODY MASS INDEX: 36.72 KG/M2 | HEART RATE: 88 BPM | RESPIRATION RATE: 16 BRPM | TEMPERATURE: 98.4 F | WEIGHT: 204 LBS | SYSTOLIC BLOOD PRESSURE: 106 MMHG | DIASTOLIC BLOOD PRESSURE: 71 MMHG | OXYGEN SATURATION: 98 %

## 2017-09-05 DIAGNOSIS — E55.9 VITAMIN D DEFICIENCY: ICD-10-CM

## 2017-09-05 DIAGNOSIS — Z98.891 PREVIOUS CESAREAN SECTION: ICD-10-CM

## 2017-09-05 DIAGNOSIS — K21.9 GASTROESOPHAGEAL REFLUX DISEASE, ESOPHAGITIS PRESENCE NOT SPECIFIED: ICD-10-CM

## 2017-09-05 DIAGNOSIS — Z00.00 HEALTHCARE MAINTENANCE: ICD-10-CM

## 2017-09-05 DIAGNOSIS — O09.529 ENCOUNTER FOR SUPERVISION OF HIGH-RISK PREGNANCY WITH ELDERLY MULTIGRAVIDA: Primary | ICD-10-CM

## 2017-09-05 LAB — HEMOGLOBIN: 13 G/DL (ref 11.7–15.7)

## 2017-09-05 RX ORDER — ACETAMINOPHEN 325 MG/1
325-650 TABLET ORAL EVERY 4 HOURS PRN
Qty: 100 TABLET | Refills: 0 | Status: SHIPPED | OUTPATIENT
Start: 2017-09-05 | End: 2017-11-02

## 2017-09-05 NOTE — LETTER
September 7, 2017      Marti Vale  [address] [address]        Dear Marti,    Thank you for getting your care at [facility]. Please see below for your test results.    Resulted Orders   Hemoglobin (HGB) ([facility])   Result Value Ref Range    Hemoglobin 13.0 11.7 - 15.7 g/dL   Anti Treponema   Result Value Ref Range    Treponema pallidum Antibody Negative NEG^Negative       If you have any concerns about these results please call and leave a message for me or send a Recondo message to the clinic.    Sincerely,    Lita Alcocer MD

## 2017-09-05 NOTE — LETTER
September 5, 2017      Marti Vale  [address] [address]        Dear Marti,    Thank you for getting your care at [facility]. Please see below for your test results.    Resulted Orders   Hemoglobin (HGB) ([facility])   Result Value Ref Range    Hemoglobin 13.0 11.7 - 15.7 g/dL       If you have any concerns about these results please call and leave a message for me or send a Rain message to the clinic.    Sincerely,    Lita Alcocer MD

## 2017-09-05 NOTE — PATIENT INSTRUCTIONS
Here is the plan from today's visit    1. Encounter for supervision of high-risk pregnancy with elderly multigravida  - Hemoglobin (HGB) ([facility])  - Anti Treponema    2. Previous  section  - OB/GYN REFERRAL    3. Gastroesophageal reflux disease, esophagitis presence not specified  - omeprazole (PRILOSEC) 20 MG CR capsule; Take 1 capsule (20 mg) by mouth daily  Dispense: 90 capsule; Refill: 1    Please call or return to clinic if your symptoms don't go away.    Follow up plan  Please make a clinic appointment for follow up with me (JASON WADE) in 2 weeks.     Thank you for coming to [facility] today.  Lab Testing:  **If you had lab testing today and your results are reassuring or normal they will be mailed to you or sent through ReplyBuy within 7 days.   **If the lab tests need quick action we will call you with the results.  The phone number we will call with results is # [phone] (home) . If this is not the best number please call our clinic and change the number.  Medication Refills:  If you need any refills please call your pharmacy and they will contact us.   If you need to  your refill at a new pharmacy, please contact the new pharmacy directly. The new pharmacy will help you get your medications transferred faster.   Scheduling:  If you have any concerns about today's visit or wish to schedule another appointment please call our office during normal business hours [phone] (8-5:00 M-F)  If a referral was made to a [facility] and you don't get a call from central scheduling please call [phone].  If a Mammogram was ordered for you at [facility] call [phone] to schedule or change your appointment.  If you had an XRay/CT/Ultrasound/MRI ordered the number is [phone] to schedule or change your radiology appointment.   Medical Concerns:  If you have urgent medical concerns please call [phone] at any time of the day.    OB-Gyn  referral  Hi, this pt was seen for this on 9/25.  Thanks!           Aisha

## 2017-09-05 NOTE — MR AVS SNAPSHOT
After Visit Summary   2017    Marti Vale    MRN: [id number]           Patient Information     Date Of Birth          1980        Visit Information        Provider Department      2017 8:40 AM [facility]        Today's Diagnoses     Encounter for supervision of high-risk pregnancy with elderly multigravida    -  1    Previous  section        Gastroesophageal reflux disease, esophagitis presence not specified          Care Instructions    Here is the plan from today's visit    1. Encounter for supervision of high-risk pregnancy with elderly multigravida  - Hemoglobin (HGB) (hospitals)  - Anti Treponema    2. Previous  section  - OB/GYN REFERRAL    3. Gastroesophageal reflux disease, esophagitis presence not specified  - omeprazole (PRILOSEC) 20 MG CR capsule; Take 1 capsule (20 mg) by mouth daily  Dispense: 90 capsule; Refill: 1    Please call or return to clinic if your symptoms don't go away.    Follow up plan  Please make a clinic appointment for follow up with me (JASON WADE) in 2 weeks.     Thank you for coming to [facility] today.  Lab Testing:  **If you had lab testing today and your results are reassuring or normal they will be mailed to you or sent through WiLinx within 7 days.   **If the lab tests need quick action we will call you with the results.  The phone number we will call with results is # [phone] (home) . If this is not the best number please call our clinic and change the number.  Medication Refills:  If you need any refills please call your pharmacy and they will contact us.   If you need to  your refill at a new pharmacy, please contact the new pharmacy directly. The new pharmacy will help you get your medications transferred faster.   Scheduling:  If you have any concerns about today's visit or wish to schedule another appointment please call our office during normal business  hours [phone] (8-5:00 M-F)  If a referral was made to a [facility] and you don't get a call from central scheduling please call [phone].  If a Mammogram was ordered for you at [facility] call [phone] to schedule or change your appointment.  If you had an XRay/CT/Ultrasound/MRI ordered the number is [phone] to schedule or change your radiology appointment.   Medical Concerns:  If you have urgent medical concerns please call [phone] at any time of the day.            Follow-ups after your visit        Additional Services     OB/GYN REFERRAL       Your provider has referred you to:  [facility]: [facility] [phone]   [email or web address]    Please be aware that coverage of these services is subject to the terms and limitations of your health insurance plan.  Call member services at your health plan with any benefit or coverage questions.      Please bring the following with you to your appointment:    (1) Any X-Rays, CTs or MRIs which have been performed.  Contact the facility where they were done to arrange for  prior to your scheduled appointment.   (2) List of current medications   (3) This referral request   (4) Any documents/labs given to you for this referral                  Who to contact     Please call your clinic at [phone] to:    Ask questions about your health    Make or cancel appointments    Discuss your medicines    Learn about your test results    Speak to your doctor   If you have compliments or concerns about an experience at your clinic, or if you wish to file a complaint, please contact [facility] Patient Relations at [phone] or email us at [email or web address]         Additional Information About Your Visit        HealthSoukhart Information     Caipiaobao is an electronic gateway that provides easy, online access to your  medical records. With Clinicient, you can request a clinic appointment, read your test results, renew a prescription or communicate with your care team.     To sign up for Clinicient visit the website at [email or web address]   You will be asked to enter the access code listed below, as well as some personal information. Please follow the directions to create your username and password.     Your access code is: [email or web address]  Expires: 2017  7:05 AM     Your access code will  in 90 days. If you need help or a new code, please contact your [facility] or call [phone] for assistance.        Care EveryWhere ID     This is your Care EveryWhere ID. This could be used by other organizations to access your [facility] medical records  [id number]        Your Vitals Were     Pulse Temperature Respirations Last Period Pulse Oximetry BMI (Body Mass Index)    88 98.4  F (36.9  C) (Oral) 16 2017 98% 36.72 kg/m2       Blood Pressure from Last 3 Encounters:   17 106/71   17 116/74   17 96/68    Weight from Last 3 Encounters:   17 204 lb (92.5 kg)   17 194 lb 9.6 oz (88.3 kg)   17 194 lb (88 kg)              We Performed the Following     Anti Treponema     Hemoglobin (HGB) ([facility])     OB/GYN REFERRAL          Today's Medication Changes          These changes are accurate as of: 17  9:30 AM.  If you have any questions, ask your nurse or doctor.               Start taking these medicines.        Dose/Directions    omeprazole 20 MG CR capsule   Commonly known as:  priLOSEC   Used for:  Gastroesophageal reflux disease, esophagitis presence not specified   Started by:  Lita Alcocer MD        Dose:  20 mg   Take 1 capsule (20 mg) by mouth daily   Quantity:  90 capsule   Refills:  1            Where to get your medicines      These medications were sent to [facility] [place], [place] - [address]  [facility] [address]     Phone:  [phone]     omeprazole 20 MG CR capsule                Primary Care Provider Office Phone # Fax #    Bhavna Milana Mena [phone]       [address]  [facility] [address]        Equal Access to Services     ARYAAVELINO CAMILLE AH: Hadii ehsan ku hadsuelleno Soomaali, waaxda luqadaha, qaybta kaalmada adeegyada, waxdaphne rosaurain hayaan adesarah rg lawilfredoflor antoine. So [facility] [phone].    ATENCIÓN: Si habla español, tiene a andre disposición servicios gratuitos de asistencia lingüística. Llame al [phone].    We comply with applicable federal civil rights laws and [place] laws. We do not discriminate on the basis of race, color, national origin, age, disability sex, sexual orientation or gender identity.            Thank you!     Thank you for choosing [facility]  for your care. Our goal is always to provide you with excellent care. Hearing back from our patients is one way we can continue to improve our services. Please take a few minutes to complete the written survey that you may receive in the mail after your visit with us. Thank you!             Your Updated Medication List - Protect others around you: Learn how to safely use, store and throw away your medicines at www.disposemymeds.org.          This list is accurate as of: 9/5/17  9:30 AM.  Always use your most recent med list.                   Brand Name Dispense Instructions for use Diagnosis    acetaminophen 325 MG tablet    TYLENOL    100 tablet    Take 1-2 tablets (325-650 mg) by mouth every 4 hours as needed for mild pain or fever Reported on 5/15/2017        Cholecalciferol 4000 UNITS Caps     30 capsule    Take 4,000 Units by mouth daily    Vitamin D deficiency       omeprazole 20 MG CR capsule    priLOSEC    90 capsule    Take 1 capsule (20 mg) by mouth daily    Gastroesophageal reflux disease, esophagitis presence not specified       PRENATAL 19 Chew     90 tablet    Take 1 tablet by mouth  daily    Pregnancy examination or test, positive result       ranitidine 300 MG tablet    ZANTAC    30 tablet    Take 1 tablet (300 mg) by mouth At Bedtime    Gastroesophageal reflux disease without esophagitis       SEA-BAND ADULT Belkys     1 each    1 Device as needed    Nausea       senna 8.6 MG tablet    SENOKOT    30 tablet    Take 1 tablet by mouth daily    Slow transit constipation

## 2017-09-05 NOTE — NURSING NOTE
name: Angela  Language: Azerbaijani  Agency: [employer]  Phone number: [phone]  Klaudia Santillan CMA

## 2017-09-05 NOTE — LETTER
September 7, 2017      Marti Vale  [address] [address]        Dear Marti,    Thank you for getting your care at [facility]. Please see below for your test results.    Resulted Orders   Hemoglobin (HGB) ([facility])   Result Value Ref Range    Hemoglobin 13.0 11.7 - 15.7 g/dL   Anti Treponema   Result Value Ref Range    Treponema pallidum Antibody Negative NEG^Negative     Your labs were normal.     If you have any concerns about these results please call and leave a message for me or send a Nutmeg Education message to the clinic.    Sincerely,    Bhavna Mena MD

## 2017-09-05 NOTE — PROGRESS NOTES
Preceptor Attestation:   Patient seen and discussed with the resident.   Assessment and plan reviewed with resident and agreed upon.   Supervising Physician:  Andrew Madrid MD  [facility]

## 2017-09-05 NOTE — PROGRESS NOTES
Return OB visit  29-34 weeks    Subjective:   Marti is a 37 year old  female at 32w5d who returns for prenatal care. STALIN Oct 26, 2017.  - Concerns today: +Heartburn and epigastric abdominal pain, not helping with ranitidine 300mg at bedtime, worsening throughout this pregnancy, wakes up at night with severe heartburn  - Patient reports no vaginal bleeding, no leakage of fluid. Contractions none. Fetal movement is present.   - No nausea/vomiting.   - No vaginal discharge. No dysuria.   - No headache, vision changes, lower extremity swelling, chest pain, shortness of breath.   - Mood has been okay.     Objective:   /71  Pulse 88  Temp 98.4  F (36.9  C) (Oral)  Resp 16  Wt 204 lb (92.5 kg)  LMP 2017  SpO2 98%  BMI 36.72 kg/m2   Const: No distress  Abd: Gravid.   See flowsheet for FH, FHTs, fetal presentation, edema.    Prenatal labs:   -   Hemoglobin   Date Value Ref Range Status   2017 13.0 11.7 - 15.7 g/dL Final       Assessment & plan:   IUP at 32w5d weeks by LMP consistent with first trimester ultrasound.     - Pregnancy complicated by: 3 previous c-sections   - Prenatal labs reviewed. Patient did not pass 1 hour GCT but did pass 3 hour GTT. Fetal survey showed no abnormalities requiring follow up ultrasound.   - Patient is measuring appropriately for gestational age. Pregnancy weight gain has been 22lbs to date, which is adequate.   - Patient plans to breast feed and use IUD for contraception after delivery.   - Discussed circumcision: if male will do circumcision   - Provided counseling regarding:  labor signs, hospital readiness, parenting resources (WIC, etc).   - Patient will continue taking prenatal vitamins and avoiding cigarettes & alcohol. Tdap refused.   -Breastfeeding education provided:  - Supporting a non-medicated birth  - Skin to Skin  - Non-separation of mother and baby    - Return to clinic in 2 weeks.     - Specific concerns addressed today:   Marti was  seen today for prenatal care.    Diagnoses and all orders for this visit:    Encounter for supervision of high-risk pregnancy with elderly multigravida  -     Hemoglobin (HGB) ([facility])  -     Anti Treponema    Previous  section  -     OB/GYN REFERRAL    Gastroesophageal reflux disease, esophagitis presence not specified  No improvement with zantac, will try omeprazole. Discussed benefits and risks associated with this medication during pregnancy.  -     omeprazole (PRILOSEC) 20 MG CR capsule; Take 1 capsule (20 mg) by mouth daily    Vitamin D deficiency  -     Cholecalciferol 4000 UNITS CAPS; Take 4,000 Units by mouth daily    Healthcare maintenance  -     acetaminophen (TYLENOL) 325 MG tablet; Take 1-2 tablets (325-650 mg) by mouth every 4 hours as needed for mild pain or fever     Options for treatment and follow-up care were reviewed with the patient and/or guardian. Marti Vale and/or guardian engaged in the decision making process and verbalized understanding of the options discussed and agreed with the final plan.    Lita Alcocer MD

## 2017-09-07 LAB — T PALLIDUM IGG+IGM SER QL: NEGATIVE

## 2017-09-20 ENCOUNTER — OFFICE VISIT (OUTPATIENT)
Dept: FAMILY MEDICINE | Facility: CLINIC | Age: 37
End: 2017-09-20

## 2017-09-20 VITALS
RESPIRATION RATE: 16 BRPM | TEMPERATURE: 98.2 F | WEIGHT: 208.6 LBS | BODY MASS INDEX: 37.55 KG/M2 | HEART RATE: 105 BPM | OXYGEN SATURATION: 97 % | SYSTOLIC BLOOD PRESSURE: 114 MMHG | DIASTOLIC BLOOD PRESSURE: 74 MMHG

## 2017-09-20 DIAGNOSIS — E55.9 VITAMIN D DEFICIENCY: ICD-10-CM

## 2017-09-20 DIAGNOSIS — O09.529 HIGH-RISK PREGNANCY, ELDERLY MULTIGRAVIDA, UNSPECIFIED TRIMESTER: Primary | ICD-10-CM

## 2017-09-20 DIAGNOSIS — K59.01 SLOW TRANSIT CONSTIPATION: ICD-10-CM

## 2017-09-20 DIAGNOSIS — K21.9 GASTROESOPHAGEAL REFLUX DISEASE, ESOPHAGITIS PRESENCE NOT SPECIFIED: ICD-10-CM

## 2017-09-20 RX ORDER — SENNOSIDES A AND B 8.6 MG/1
1 TABLET, FILM COATED ORAL DAILY
Qty: 30 TABLET | Refills: 3 | Status: SHIPPED | OUTPATIENT
Start: 2017-09-20 | End: 2018-04-27

## 2017-09-20 RX ORDER — MAGNESIUM HYDROXIDE/ALUMINUM HYDROXICE/SIMETHICONE 120; 1200; 1200 MG/30ML; MG/30ML; MG/30ML
30 SUSPENSION ORAL EVERY 6 HOURS PRN
Qty: 355 ML | Refills: 0 | Status: SHIPPED | OUTPATIENT
Start: 2017-09-20 | End: 2018-04-27

## 2017-09-20 NOTE — PATIENT INSTRUCTIONS
Here is the plan from today's visit    1. High-risk pregnancy, elderly multigravida, unspecified trimester    2. Vitamin D deficiency  - Vitamin D Level    3. Slow transit constipation  - senna (SENOKOT) 8.6 MG tablet; Take 1 tablet by mouth daily  Dispense: 30 tablet; Refill: 3    4. Gastroesophageal reflux disease, esophagitis presence not specified  - alum & mag hydroxide-simethicone (MYLANTA/MAALOX) 200-200-20 MG/5ML SUSP suspension; Take 30 mLs by mouth every 6 hours as needed for heartburn  Dispense: 355 mL; Refill: 0    Please call or return to clinic if your symptoms don't go away.    Follow up plan  Please make a clinic appointment for follow up with me (JASON WADE) in 2  weeks for OB care.    Thank you for coming to [facility] today.  Lab Testing:  **If you had lab testing today and your results are reassuring or normal they will be mailed to you or sent through Papriika within 7 days.   **If the lab tests need quick action we will call you with the results.  The phone number we will call with results is # [phone] (home) . If this is not the best number please call our clinic and change the number.  Medication Refills:  If you need any refills please call your pharmacy and they will contact us.   If you need to  your refill at a new pharmacy, please contact the new pharmacy directly. The new pharmacy will help you get your medications transferred faster.   Scheduling:  If you have any concerns about today's visit or wish to schedule another appointment please call our office during normal business hours [phone] (8-5:00 M-F)  If a referral was made to a [facility] and you don't get a call from central scheduling please call [phone].  If a Mammogram was ordered for you at [facility] call [phone] to schedule or change your appointment.  If you had an XRay/CT/Ultrasound/MRI ordered the number is [phone] to schedule or change your  radiology appointment.   Medical Concerns:  If you have urgent medical concerns please call [phone] at any time of the day.

## 2017-09-20 NOTE — PROGRESS NOTES
Preceptor Attestation:   Patient seen and discussed with the resident. Assessment and plan reviewed with resident and agreed upon.   Supervising Physician:  Elier Cooper MD  [facility]

## 2017-09-20 NOTE — MR AVS SNAPSHOT
After Visit Summary   9/20/2017    Marti Vale    MRN: [id number]           Patient Information     Date Of Birth          1980        Visit Information        Provider Department      9/20/2017 2:20 PM Jason Wade MD [facility]        Today's Diagnoses     High-risk pregnancy, elderly multigravida, unspecified trimester    -  1    Vitamin D deficiency        Slow transit constipation        Gastroesophageal reflux disease, esophagitis presence not specified          Care Instructions    Here is the plan from today's visit    1. High-risk pregnancy, elderly multigravida, unspecified trimester    2. Vitamin D deficiency  - Vitamin D Level    3. Slow transit constipation  - senna (SENOKOT) 8.6 MG tablet; Take 1 tablet by mouth daily  Dispense: 30 tablet; Refill: 3    4. Gastroesophageal reflux disease, esophagitis presence not specified  - alum & mag hydroxide-simethicone (MYLANTA/MAALOX) 200-200-20 MG/5ML SUSP suspension; Take 30 mLs by mouth every 6 hours as needed for heartburn  Dispense: 355 mL; Refill: 0    Please call or return to clinic if your symptoms don't go away.    Follow up plan  Please make a clinic appointment for follow up with me (JASON WADE) in 2  weeks for OB care.    Thank you for coming to [facility] today.  Lab Testing:  **If you had lab testing today and your results are reassuring or normal they will be mailed to you or sent through reportbrain within 7 days.   **If the lab tests need quick action we will call you with the results.  The phone number we will call with results is # [phone] (home) . If this is not the best number please call our clinic and change the number.  Medication Refills:  If you need any refills please call your pharmacy and they will contact us.   If you need to  your refill at a new pharmacy, please contact the new pharmacy directly. The new pharmacy will help you get your medications  transferred faster.   Scheduling:  If you have any concerns about today's visit or wish to schedule another appointment please call our office during normal business hours [phone] (8-5:00 M-F)  If a referral was made to a [facility] and you don't get a call from central scheduling please call [phone].  If a Mammogram was ordered for you at [facility] call [phone] to schedule or change your appointment.  If you had an XRay/CT/Ultrasound/MRI ordered the number is [phone] to schedule or change your radiology appointment.   Medical Concerns:  If you have urgent medical concerns please call [phone] at any time of the day.            Follow-ups after your visit        Your next 10 appointments already scheduled     Sep 25, 2017  8:30 AM CDT   New Patient Visit with Peggy Mccann MD   [facility] ([facility])    [facility] [address]  [address]  [facility] [id number] [phone]              Who to contact     Please call your clinic at [phone] to:    Ask questions about your health    Make or cancel appointments    Discuss your medicines    Learn about your test results    Speak to your doctor   If you have compliments or concerns about an experience at your clinic, or if you wish to file a complaint, please contact [facility] Patient Relations at [phone] or email us at [email or web address]         Additional Information About Your Visit        MyChart Information     eJamming is an electronic gateway that provides easy, online access to your medical records. With eJamming, you can request a clinic appointment, read your test results, renew a prescription or communicate with your care team.     To sign up for eJamming visit the website at [email or web address]   You will be asked to enter the access code listed below, as well as some  personal information. Please follow the directions to create your username and password.     Your access code is: [email or web address]  Expires: 2017  3:20 PM     Your access code will  in 90 days. If you need help or a new code, please contact your [facility] or call [phone] for assistance.        Care EveryWhere ID     This is your Care EveryWhere ID. This could be used by other organizations to access your [place] medical records  [id number]        Your Vitals Were     Pulse Temperature Respirations Last Period Pulse Oximetry BMI (Body Mass Index)    105 98.2  F (36.8  C) (Oral) 16 2017 97% 37.55 kg/m2       Blood Pressure from Last 3 Encounters:   17 114/74   17 106/71   17 116/74    Weight from Last 3 Encounters:   17 208 lb 9.6 oz (94.6 kg)   17 204 lb (92.5 kg)   17 194 lb 9.6 oz (88.3 kg)              We Performed the Following     Vitamin D Level          Today's Medication Changes          These changes are accurate as of: 17  3:20 PM.  If you have any questions, ask your nurse or doctor.               Start taking these medicines.        Dose/Directions    alum & mag hydroxide-simethicone 200-200-20 MG/5ML Susp suspension   Commonly known as:  MYLANTA/MAALOX   Used for:  Gastroesophageal reflux disease, esophagitis presence not specified   Started by:  Lita Alcocer MD        Dose:  30 mL   Take 30 mLs by mouth every 6 hours as needed for heartburn   Quantity:  355 mL   Refills:  0         Stop taking these medicines if you haven't already. Please contact your care team if you have questions.     omeprazole 20 MG CR capsule   Commonly known as:  priLOSEC   Stopped by:  Lita Alcocer MD                Where to get your medicines      These medications were sent to [facility] [place], [place] - [address] [address]     Phone:  [phone]     alum & mag  hydroxide-simethicone 200-200-20 MG/5ML Susp suspension    senna 8.6 MG tablet                Primary Care Provider Office Phone # Fax #    Bhavna Milana Mena [phone]       [address] [facility] [address]        Equal Access to Services     CHEPE OBRIEN : Hadii aad ku hadasho Soomaali, waaxda luqadaha, qaybta kaalmada adeegyada, waxdaphne rosaurain sharlan kaitlynsarah rg carlo antoine. So [facility] [phone].    ATENCIÓN: Si habla español, tiene a andre disposición servicios gratuitos de asistencia lingüística. Javierame al [phone].    We comply with applicable federal civil rights laws and [place] laws. We do not discriminate on the basis of race, color, national origin, age, disability sex, sexual orientation or gender identity.            Thank you!     Thank you for choosing [facility]  for your care. Our goal is always to provide you with excellent care. Hearing back from our patients is one way we can continue to improve our services. Please take a few minutes to complete the written survey that you may receive in the mail after your visit with us. Thank you!             Your Updated Medication List - Protect others around you: Learn how to safely use, store and throw away your medicines at www.disposemymeds.org.          This list is accurate as of: 9/20/17  3:20 PM.  Always use your most recent med list.                   Brand Name Dispense Instructions for use Diagnosis    acetaminophen 325 MG tablet    TYLENOL    100 tablet    Take 1-2 tablets (325-650 mg) by mouth every 4 hours as needed for mild pain or fever Reported on 5/15/2017    Healthcare maintenance       alum & mag hydroxide-simethicone 200-200-20 MG/5ML Susp suspension    MYLANTA/MAALOX    355 mL    Take 30 mLs by mouth every 6 hours as needed for heartburn    Gastroesophageal reflux disease, esophagitis presence not specified       Cholecalciferol 4000 UNITS Caps     30 capsule    Take 4,000 Units by mouth  daily    Vitamin D deficiency       PRENATAL 19 Chew     90 tablet    Take 1 tablet by mouth daily    Pregnancy examination or test, positive result       ranitidine 300 MG tablet    ZANTAC    30 tablet    Take 1 tablet (300 mg) by mouth At Bedtime    Gastroesophageal reflux disease without esophagitis       SEA-BAND ADULT Belkys     1 each    1 Device as needed    Nausea       senna 8.6 MG tablet    SENOKOT    30 tablet    Take 1 tablet by mouth daily    Slow transit constipation

## 2017-09-20 NOTE — LETTER
September 21, 2017      Marti Vale  [address] [address]        Dear Marti,    Thank you for getting your care at [facility]. Please see below for your test results.    Resulted Orders   Vitamin D Level   Result Value Ref Range    Vitamin D Deficiency screening 35 20 - 75 ug/L      Comment:      Season, race, dietary intake, and treatment affect the concentration of   25-hydroxy-Vitamin D. Values may decrease during winter months and increase   during summer months. Values 20-29 ug/L may indicate Vitamin D insufficiency   and values <20 ug/L may indicate Vitamin D deficiency.  Vitamin D determination is routinely performed by an immunoassay specific for   25 hydroxyvitamin D3.  If an individual is on vitamin D2 (ergocalciferol)   supplementation, please specify 25 OH vitamin D2 and D3 level determination by   LCMSMS test VITD23.       Continue taking your Vitamin D.  Your level is now in the normal range.      If you have any concerns about these results please call and leave a message for me or send a MyChart message to the clinic.    Sincerely,    Bhavna Mena MD

## 2017-09-20 NOTE — PROGRESS NOTES
Return OB visit  29-34 weeks    Subjective:   Marti is a 37 year old  female at 34w6d who returns for prenatal care. STALIN Oct 26, 2017.  - Concerns today: no improvement in heartburn with omeprazole with side effect of dry mouth. Previously tried tums earlier in pregnancy that did not help. Currently taking zantac with minimal relief.   - Patient reports no vaginal bleeding, no leakage of fluid. She had one contraction yesterday with associated back pain that lasted a few minutes . Fetal movement is present.   - No nausea/vomiting.  - No vaginal discharge. No dysuria.   - No headache, vision changes, upper abdominal pain, chest pain, shortness of breath. Some mild swelling in the lower extremities.   - Mood has been okay.     Objective:    /74  Pulse 105  Temp 98.2  F (36.8  C) (Oral)  Resp 16  Wt 208 lb 9.6 oz (94.6 kg)  LMP 2017  SpO2 97%  BMI 37.55 kg/m2   Const: No distress  Abd: Gravid.   See flowsheet for FH, FHTs, fetal presentation, edema.    Prenatal labs:   -   Hemoglobin   Date Value Ref Range Status   2017 13.0 11.7 - 15.7 g/dL Final       Assessment & plan:   IUP at 34w6d weeks by LMP consistent with first trimester ultrasound.     - Pregnancy complicated by: 3 previous c-sections (she has upcoming appointment with OB/GYN for  on Monday.   - Prenatal labs reviewed. Patient did not pass 1 hour GCT, but passed 3 hour GTT. Fetal survey showed no abnormalities requiring follow up ultrasound.   - Patient is measuring appropriately for gestational age. Pregnancy weight gain has been 26lbs to date, which is adequate.   - Patient plans to breast feed and use IUD for contraception after delivery.   - Discussed circumcision  - Provided counseling regarding:  labor signs, hospital readiness, sibling preparation, parenting resources (WIC, etc).   - Patient will continue taking prenatal vitamins and avoiding cigarettes & alcohol.  She declined Tdap and flu shot and  counseling provided regarding importance of vaccination during pregnancy.   -Breastfeeding education provided:  - Supporting a non-medicated birth  - Skin to Skin  - Non-separation of mother and baby    - Return to clinic in 2 weeks.     - Specific concerns addressed today:   Marti was seen today for prenatal care and refill request.    Diagnoses and all orders for this visit:    High-risk pregnancy, elderly multigravida, unspecified trimester    Vitamin D deficiency  -     Vitamin D Level    Slow transit constipation  -     senna (SENOKOT) 8.6 MG tablet; Take 1 tablet by mouth daily    Gastroesophageal reflux disease, esophagitis presence not specified  Discussed that mylanta is a category C medication with unknown risks to the fetus and she acknowledges the risks.   -     alum & mag hydroxide-simethicone (MYLANTA/MAALOX) 200-200-20 MG/5ML SUSP suspension; Take 30 mLs by mouth every 6 hours as needed for heartburn    Options for treatment and follow-up care were reviewed with the patient and/or guardian. Marti Martin Kavin and/or guardian engaged in the decision making process and verbalized understanding of the options discussed and agreed with the final plan.    Lita Alcocer MD

## 2017-09-21 LAB — DEPRECATED CALCIDIOL+CALCIFEROL SERPL-MC: 35 UG/L (ref 20–75)

## 2017-09-25 ENCOUNTER — OFFICE VISIT (OUTPATIENT)
Dept: OBGYN | Facility: CLINIC | Age: 37
End: 2017-09-25
Attending: OBSTETRICS & GYNECOLOGY
Payer: MEDICAID

## 2017-09-25 VITALS
SYSTOLIC BLOOD PRESSURE: 117 MMHG | HEIGHT: 63 IN | DIASTOLIC BLOOD PRESSURE: 72 MMHG | HEART RATE: 88 BPM | WEIGHT: 211.4 LBS | BODY MASS INDEX: 37.46 KG/M2

## 2017-09-25 DIAGNOSIS — G56.00 CARPAL TUNNEL SYNDROME DURING PREGNANCY: ICD-10-CM

## 2017-09-25 DIAGNOSIS — T88.59XA: ICD-10-CM

## 2017-09-25 DIAGNOSIS — O09.93 SUPERVISION OF HIGH RISK PREGNANCY IN THIRD TRIMESTER: Primary | ICD-10-CM

## 2017-09-25 DIAGNOSIS — O26.899 CARPAL TUNNEL SYNDROME DURING PREGNANCY: ICD-10-CM

## 2017-09-25 PROCEDURE — 99212 OFFICE O/P EST SF 10 MIN: CPT | Mod: ZF

## 2017-09-25 RX ORDER — CITRIC ACID/SODIUM CITRATE 334-500MG
30 SOLUTION, ORAL ORAL
Status: CANCELLED | OUTPATIENT
Start: 2017-09-25

## 2017-09-25 RX ORDER — SODIUM CHLORIDE, SODIUM LACTATE, POTASSIUM CHLORIDE, CALCIUM CHLORIDE 600; 310; 30; 20 MG/100ML; MG/100ML; MG/100ML; MG/100ML
INJECTION, SOLUTION INTRAVENOUS CONTINUOUS
Status: CANCELLED | OUTPATIENT
Start: 2017-09-25

## 2017-09-25 RX ORDER — LIDOCAINE 40 MG/G
CREAM TOPICAL
Status: CANCELLED | OUTPATIENT
Start: 2017-09-25

## 2017-09-25 NOTE — LETTER
2017       RE: Marti Vale  [address] [address]     Dear Colleague,    Thank you for referring your patient, Marti Vale, to the [facility] at [facility]. Please see a copy of my visit note below.    [facility]   Consult    HPI: Marti Vale is a 37 year old  at 35w4d by LMP consistent with 8w6d ultrasound who presents for consultation for  due to two prior c-sections. She has been receiving regular prenatal care at [facility], and her pregnancy has been largely uncomplicated, aside from advanced maternal age. She reports feeling well today and denies bleeding, contractions, LOF, chest pain, SOB, HA or other complaints. She has been feeling normal fetal movement.     OB history:  SAB x2; one treated with D&C  1997:   2001:   2006: LTCS, non-reassuring fetal status  2008: elective repeat;   2009: elective repeat; also complicated by high spinal, converted to GETA. Operative report notes minimal fascial adhesions and minimal pelvic adhesions.     Prenatal labs:  A positive, antibody negative  Rubella immune  HIV neg  Anti treponema neg  HBSAg neg  Gc/chl neg  ; GTT 79/161/165/121  28 week Hgb 13    Anatomy US :  Anterior placenta, and posterior on the left  Normal anatomy; nose not well visualized    Past Medical History:   Diagnosis Date     Anemia      Past Surgical History:   Procedure Laterality Date     DILATION AND CURETTAGE SUCTION N/A 2015    Procedure: DILATION AND CURETTAGE SUCTION;  Surgeon: Mita Wadsworth MD;  Location: [facility] OR     GYN SURGERY       x3       Current Outpatient Prescriptions:      senna (SENOKOT) 8.6 MG tablet, Take 1 tablet by mouth daily, Disp: 30 tablet, Rfl: 3     alum & mag hydroxide-simethicone (MYLANTA/MAALOX) 200-200-20 MG/5ML SUSP suspension, Take 30 mLs by mouth every 6 hours as  "needed for heartburn, Disp: 355 mL, Rfl: 0     acetaminophen (TYLENOL) 325 MG tablet, Take 1-2 tablets (325-650 mg) by mouth every 4 hours as needed for mild pain or fever Reported on 5/15/2017, Disp: 100 tablet, Rfl: 0     Cholecalciferol 4000 UNITS CAPS, Take 4,000 Units by mouth daily, Disp: 30 capsule, Rfl: 3     ranitidine (ZANTAC) 300 MG tablet, Take 1 tablet (300 mg) by mouth At Bedtime, Disp: 30 tablet, Rfl: 1     Prenatal Vit-Fe Fumarate-FA (PRENATAL 19) CHEW, Take 1 tablet by mouth daily, Disp: 90 tablet, Rfl: 3     Acupressure Device (SEA-BAND ADULT) BHARGAVI, 1 Device as needed, Disp: 1 each, Rfl: 0     Exam:  Vitals:    17 0847   BP: 117/72   Pulse: 88   Weight: 95.9 kg (211 lb 6.4 oz)   Height: 1.588 m (5' 2.5\")     Gen: alert, oriented, NAD  Abd: gravid, non-tender. Fundal height 37 cm. Well-healed Pfannenstiel incision  FHT: 150  Ext: trace LE edema    A/P:  37 year old  presenting for  consultation due to history of three prior c-sections.    - Discussed repeat , surgical risks with patient. Reviewed option of tubal ligation, which she declines today. She thinks she might use an IUD postpartum. We reviewed risks associated with multiple c-sections including increased risk of injury to other intraabdominal structures and risks of abnormal placentation. She is not certain if she will have more children; thinks perhaps one more.   - Surgery request submitted for 39+0 weeks (10/19)  - Will make sure anesthesia is aware of patient's history of high spinal necessitating conversion to GETA with both of her last two c-sections    Peggy Mccann MD              "

## 2017-09-25 NOTE — MR AVS SNAPSHOT
After Visit Summary   2017    Marti Vale    MRN: [id number]           Patient Information     Date Of Birth          1980        Visit Information        Provider Department      2017 8:30 AM Peggy Mccann MD [facility]        Today's Diagnoses     Supervision of high risk pregnancy in third trimester    -  1    Carpal tunnel syndrome during pregnancy        Anesthesia complication, initial encounter           Follow-ups after your visit        Your next 10 appointments already scheduled     Oct 04, 2017  3:00 PM CDT   RETURN OB with Lita Alcocer MD   [facility] ([facility])    [address] [place] [address],  [address]  [place] [address]   [phone]              Who to contact     Please call your clinic at [phone] to:    Ask questions about your health    Make or cancel appointments    Discuss your medicines    Learn about your test results    Speak to your doctor   If you have compliments or concerns about an experience at your clinic, or if you wish to file a complaint, please contact [facility] Patient Relations at [phone] or email us at [email or web address]         Additional Information About Your Visit        MyChart Information     Estimotet is an electronic gateway that provides easy, online access to your medical records. With [employer], you can request a clinic appointment, read your test results, renew a prescription or communicate with your care team.     To sign up for Estimotet visit the website at [email or web address]   You will be asked to enter the access code listed below, as well as some personal information. Please follow the directions to create your username and password.     Your access code is: [email or web address]  Expires: 2017  3:20 PM     Your access code will  in 90 days. If you need help or a new code, please contact your  "[facility] or call [phone] for assistance.        Care EveryWhere ID     This is your Care EveryWhere ID. This could be used by other organizations to access your [place] medical records  [id number]        Your Vitals Were     Pulse Height Last Period BMI (Body Mass Index)          88 1.588 m (5' 2.5\") 01/19/2017 38.05 kg/m2         Blood Pressure from Last 3 Encounters:   09/25/17 117/72   09/20/17 114/74   09/05/17 106/71    Weight from Last 3 Encounters:   09/25/17 95.9 kg (211 lb 6.4 oz)   09/20/17 94.6 kg (208 lb 9.6 oz)   09/05/17 92.5 kg (204 lb)              Today, you had the following     No orders found for display       Primary Care Provider Office Phone # Fax #    Bhavna Mena [phone]       [address] [facility] [address]        Equal Access to Services     CHEPE OBRIEN : Hadii aad ku hadasho Soomaali, waaxda luqadaha, qaybta kaalmada adeegyada, waxay idiin haycrissyn sue matos . So [facility] [phone].    ATENCIÓN: Si habla español, tiene a nadre disposición servicios gratuitos de asistencia lingüística. Ll[facility] [phone].    We comply with applicable federal civil rights laws and [place] laws. We do not discriminate on the basis of race, color, national origin, age, disability sex, sexual orientation or gender identity.            Thank you!     Thank you for choosing [facility]  for your care. Our goal is always to provide you with excellent care. Hearing back from our patients is one way we can continue to improve our services. Please take a few minutes to complete the written survey that you may receive in the mail after your visit with us. Thank you!             Your Updated Medication List - Protect others around you: Learn how to safely use, store and throw away your medicines at www.disposemymeds.org.          This list is accurate as of: 9/25/17 12:18 PM.  Always use your most recent med " list.                   Brand Name Dispense Instructions for use Diagnosis    acetaminophen 325 MG tablet    TYLENOL    100 tablet    Take 1-2 tablets (325-650 mg) by mouth every 4 hours as needed for mild pain or fever Reported on 5/15/2017    Healthcare maintenance       alum & mag hydroxide-simethicone 200-200-20 MG/5ML Susp suspension    MYLANTA/MAALOX    355 mL    Take 30 mLs by mouth every 6 hours as needed for heartburn    Gastroesophageal reflux disease, esophagitis presence not specified       Cholecalciferol 4000 UNITS Caps     30 capsule    Take 4,000 Units by mouth daily    Vitamin D deficiency       PRENATAL 19 Chew     90 tablet    Take 1 tablet by mouth daily    Pregnancy examination or test, positive result       ranitidine 300 MG tablet    ZANTAC    30 tablet    Take 1 tablet (300 mg) by mouth At Bedtime    Gastroesophageal reflux disease without esophagitis       SEA-BAND ADULT Belkys     1 each    1 Device as needed    Nausea       senna 8.6 MG tablet    SENOKOT    30 tablet    Take 1 tablet by mouth daily    Slow transit constipation

## 2017-09-25 NOTE — PROGRESS NOTES
[facility]   Consult    HPI: Marti Vale is a 37 year old  at 35w4d by LMP consistent with 8w6d ultrasound who presents for consultation for  due to two prior c-sections. She has been receiving regular prenatal care at [facility], and her pregnancy has been largely uncomplicated, aside from advanced maternal age. She reports feeling well today and denies bleeding, contractions, LOF, chest pain, SOB, HA or other complaints. She has been feeling normal fetal movement.     OB history:  SAB x2; one treated with D&C  1997:   2001:   2006: LTCS, non-reassuring fetal status  2008: elective repeat;   2009: elective repeat; also complicated by high spinal, converted to GETA. Operative report notes minimal fascial adhesions and minimal pelvic adhesions.     Prenatal labs:  A positive, antibody negative  Rubella immune  HIV neg  Anti treponema neg  HBSAg neg  Gc/chl neg  ; GTT 79/161/165/121  28 week Hgb 13    Anatomy US :  Anterior placenta, and posterior on the left  Normal anatomy; nose not well visualized    Past Medical History:   Diagnosis Date     Anemia      Past Surgical History:   Procedure Laterality Date     DILATION AND CURETTAGE SUCTION N/A 2015    Procedure: DILATION AND CURETTAGE SUCTION;  Surgeon: Mita Wadsworth MD;  Location: [facility] OR     GYN SURGERY       x3       Current Outpatient Prescriptions:      senna (SENOKOT) 8.6 MG tablet, Take 1 tablet by mouth daily, Disp: 30 tablet, Rfl: 3     alum & mag hydroxide-simethicone (MYLANTA/MAALOX) 200-200-20 MG/5ML SUSP suspension, Take 30 mLs by mouth every 6 hours as needed for heartburn, Disp: 355 mL, Rfl: 0     acetaminophen (TYLENOL) 325 MG tablet, Take 1-2 tablets (325-650 mg) by mouth every 4 hours as needed for mild pain or fever Reported on 5/15/2017, Disp: 100 tablet, Rfl: 0     Cholecalciferol 4000 UNITS CAPS, Take 4,000 Units by mouth daily, Disp: 30 capsule,  "Rfl: 3     ranitidine (ZANTAC) 300 MG tablet, Take 1 tablet (300 mg) by mouth At Bedtime, Disp: 30 tablet, Rfl: 1     Prenatal Vit-Fe Fumarate-FA (PRENATAL 19) CHEW, Take 1 tablet by mouth daily, Disp: 90 tablet, Rfl: 3     Acupressure Device (SEA-BAND ADULT) BHARGAVI, 1 Device as needed, Disp: 1 each, Rfl: 0     Exam:  Vitals:    17 0847   BP: 117/72   Pulse: 88   Weight: 95.9 kg (211 lb 6.4 oz)   Height: 1.588 m (5' 2.5\")     Gen: alert, oriented, NAD  Abd: gravid, non-tender. Fundal height 37 cm. Well-healed Pfannenstiel incision  FHT: 150  Ext: trace LE edema    A/P:  37 year old  presenting for  consultation due to history of three prior c-sections.    - Discussed repeat , surgical risks with patient. Reviewed option of tubal ligation, which she declines today. She thinks she might use an IUD postpartum. We reviewed risks associated with multiple c-sections including increased risk of injury to other intraabdominal structures and risks of abnormal placentation. She is not certain if she will have more children; thinks perhaps one more.   - Surgery request submitted for 39+0 weeks (10/19)  - Will make sure anesthesia is aware of patient's history of high spinal necessitating conversion to GETA with both of her last two c-sections    Peggy Mccann MD              "

## 2017-10-04 ENCOUNTER — OFFICE VISIT (OUTPATIENT)
Dept: FAMILY MEDICINE | Facility: CLINIC | Age: 37
End: 2017-10-04

## 2017-10-04 VITALS
DIASTOLIC BLOOD PRESSURE: 76 MMHG | WEIGHT: 213.2 LBS | OXYGEN SATURATION: 98 % | BODY MASS INDEX: 38.37 KG/M2 | RESPIRATION RATE: 20 BRPM | SYSTOLIC BLOOD PRESSURE: 111 MMHG | TEMPERATURE: 98.3 F | HEART RATE: 103 BPM

## 2017-10-04 DIAGNOSIS — O09.529 HIGH-RISK PREGNANCY, ELDERLY MULTIGRAVIDA, UNSPECIFIED TRIMESTER: Primary | ICD-10-CM

## 2017-10-04 NOTE — PROGRESS NOTES
"Return OB visit  35-39 weeks    Subjective:   Marti is a 37 year old  female at 36w6d who returns for prenatal care. STALIN Oct 26, 2017.  - Concerns today: Baby is feels like it is too large and difficulty with walking due to size relived with tylenol, feels like it is heavier than previous 12lb infant and is concerned.  - Patient reports no vaginal bleeding, no leakage of fluid. Contractions none.  Fetal movement is present.   - No vaginal discharge. No dysuria.   - No headache, vision changes, lower extremity swelling, upper abdominal pain, chest pain, shortness of breath.   - Mood has been \"fine\".    Last saw OB/GYN 17 Dr. Mccann and discussed  with planned   for 39+0 weeks (10/19). History of high spinal necessitating conversion to GETA with both of her last two c-sections.   Objective:   LMP 2017   Const: No distress  Abd: Gravid.   See flowsheet for FH, FHTs, fetal presentation, EFW, edema.   31lbs  Prenatal labs:   Hemoglobin   Date Value Ref Range Status   2017 13.0 11.7 - 15.7 g/dL Final       Assessment & plan:   IUP at 36w6d weeks by LMP consistent with first trimester ultrasound.     - Pregnancy complicated by: 3 previous c-sections with planned  this pregnancy on 10/19/17 at 39+0  - Prenatal labs reviewed. Patient did not pass 1 hour GCT, but did pass 3 hour GTT. Fetal survey showed no abnormalities requiring follow up ultrasound.   - 3rd trimester Hb obtained  and normal 13.0.   - Patient is measuring larger for gestational age. Pregnancy weight gain has been 31lbs to date, which is adequate. Will send staff message to Dr. Mccann, OB/GYN physician the patient had last seen 17 for recommendations for potential growth US. Currently, no indication for growth US given scheduled .   - Patient plans to breast feed and use IUD  for contraception after delivery.   - Discussed circumcision: Yes  - Discussed  care & plan for " vaccinations.   - Provided counseling regarding labor signs, hospital readiness (transportation, bags packed), pain control options during labor.   - Patient will continue taking prenatal vitamins and avoiding cigarettes & alcohol. Tdap and flu shot have been declined.   -Breastfeeding education provided:  - Supporting a non-medicated birth  - Skin to Skin  - Non-separation of mother and baby    - Return to clinic in 1 week.    - Specific concerns addressed today:   There are no diagnoses linked to this encounter.    Options for treatment and follow-up care were reviewed with the patient and/or guardian. Marti Vale and/or guardian engaged in the decision making process and verbalized understanding of the options discussed and agreed with the final plan.    Lita Alcocer MD

## 2017-10-04 NOTE — MR AVS SNAPSHOT
After Visit Summary   10/4/2017    Marti Vale    MRN: [id number]           Patient Information     Date Of Birth          1980        Visit Information        Provider Department      10/4/2017 3:00 PM Jason Wade MD [facility]        Today's Diagnoses     High-risk pregnancy, elderly multigravida, unspecified trimester    -  1      Care Instructions    Here is the plan from today's visit    1. High-risk pregnancy, elderly multigravida, unspecified trimester      Please call or return to clinic if your symptoms don't go away.    Follow up plan  Please make a clinic appointment for follow up with me (JASON WADE) in one week for OB care.      Thank you for coming to [facility] today.  Lab Testing:  **If you had lab testing today and your results are reassuring or normal they will be mailed to you or sent through [employer] within 7 days.   **If the lab tests need quick action we will call you with the results.  The phone number we will call with results is # [phone] (home) . If this is not the best number please call our clinic and change the number.  Medication Refills:  If you need any refills please call your pharmacy and they will contact us.   If you need to  your refill at a new pharmacy, please contact the new pharmacy directly. The new pharmacy will help you get your medications transferred faster.   Scheduling:  If you have any concerns about today's visit or wish to schedule another appointment please call our office during normal business hours [phone] (8-5:00 M-F)  If a referral was made to a [facility] and you don't get a call from central scheduling please call [phone].  If a Mammogram was ordered for you at [facility] call [phone] to schedule or change your appointment.  If you had an XRay/CT/Ultrasound/MRI ordered the number is [phone] to schedule or change your radiology  appointment.   Medical Concerns:  If you have urgent medical concerns please call [phone] at any time of the day.            Follow-ups after your visit        Your next 10 appointments already scheduled     Oct 19, 2017   Procedure with Dalila Hensley MD   [facility] (--)    [address]  [place] [id number] [phone]              Who to contact     Please call your clinic at [phone] to:    Ask questions about your health    Make or cancel appointments    Discuss your medicines    Learn about your test results    Speak to your doctor   If you have compliments or concerns about an experience at your clinic, or if you wish to file a complaint, please contact [facility] Patient Relations at [phone] or email us at [email or web address]         Additional Information About Your Visit        [employer] Booked Information     [employer] is an electronic gateway that provides easy, online access to your medical records. With [employer], you can request a clinic appointment, read your test results, renew a prescription or communicate with your care team.     To sign up for [employer] visit the website at [email or web address]   You will be asked to enter the access code listed below, as well as some personal information. Please follow the directions to create your username and password.     Your access code is: [email or web address]  Expires: 2017  3:20 PM     Your access code will  in 90 days. If you need help or a new code, please contact your [facility] or call [phone] for assistance.        Care EveryWhere ID     This is your Care EveryWhere ID. This could be used by other organizations to access your [place] medical records  [id number]        Your Vitals Were     Pulse Temperature Respirations Last Period Pulse Oximetry BMI (Body Mass Index)    103 98.3  F (36.8  C) (Oral) 20 2017 98% 38.37 kg/m2       Blood Pressure from  Last 3 Encounters:   10/04/17 111/76   09/25/17 117/72   09/20/17 114/74    Weight from Last 3 Encounters:   10/04/17 213 lb 3.2 oz (96.7 kg)   09/25/17 211 lb 6.4 oz (95.9 kg)   09/20/17 208 lb 9.6 oz (94.6 kg)              Today, you had the following     No orders found for display       Primary Care Provider Office Phone # Fax #    Bhavna Milana Mena [phone]       [address] [facility] [address]        Equal Access to Services     [facility]: Hadii ehsan ferris hadshawn Sochiquita, waaxda raheemadaha, qaybta kaalmanohemy caraballo, wesley matos . So [facility] [phone].    ATENCIÓN: Si habla español, tiene a andre disposición servicios gratuitos de asistencia lingüística. [facility] [phone].    We comply with applicable federal civil rights laws and [place] laws. We do not discriminate on the basis of race, color, national origin, age, disability, sex, sexual orientation, or gender identity.            Thank you!     Thank you for choosing [facility]  for your care. Our goal is always to provide you with excellent care. Hearing back from our patients is one way we can continue to improve our services. Please take a few minutes to complete the written survey that you may receive in the mail after your visit with us. Thank you!             Your Updated Medication List - Protect others around you: Learn how to safely use, store and throw away your medicines at www.disposemymeds.org.          This list is accurate as of: 10/4/17  3:39 PM.  Always use your most recent med list.                   Brand Name Dispense Instructions for use Diagnosis    acetaminophen 325 MG tablet    TYLENOL    100 tablet    Take 1-2 tablets (325-650 mg) by mouth every 4 hours as needed for mild pain or fever Reported on 5/15/2017    Healthcare maintenance       alum & mag hydroxide-simethicone 200-200-20 MG/5ML Susp suspension    MYLANTA/MAALOX    355 mL    Take 30 mLs by  mouth every 6 hours as needed for heartburn    Gastroesophageal reflux disease, esophagitis presence not specified       Cholecalciferol 4000 UNITS Caps     30 capsule    Take 4,000 Units by mouth daily    Vitamin D deficiency       PRENATAL 19 Chew     90 tablet    Take 1 tablet by mouth daily    Pregnancy examination or test, positive result       ranitidine 300 MG tablet    ZANTAC    30 tablet    Take 1 tablet (300 mg) by mouth At Bedtime    Gastroesophageal reflux disease without esophagitis       SEA-BAND ADULT Belkys     1 each    1 Device as needed    Nausea       senna 8.6 MG tablet    SENOKOT    30 tablet    Take 1 tablet by mouth daily    Slow transit constipation

## 2017-10-04 NOTE — PATIENT INSTRUCTIONS
Here is the plan from today's visit    1. High-risk pregnancy, elderly multigravida, unspecified trimester      Please call or return to clinic if your symptoms don't go away.    Follow up plan  Please make a clinic appointment for follow up with me (JASON WADE) in one week for OB care.      Thank you for coming to [facility] today.  Lab Testing:  **If you had lab testing today and your results are reassuring or normal they will be mailed to you or sent through [employer] within 7 days.   **If the lab tests need quick action we will call you with the results.  The phone number we will call with results is # [phone] (home) . If this is not the best number please call our clinic and change the number.  Medication Refills:  If you need any refills please call your pharmacy and they will contact us.   If you need to  your refill at a new pharmacy, please contact the new pharmacy directly. The new pharmacy will help you get your medications transferred faster.   Scheduling:  If you have any concerns about today's visit or wish to schedule another appointment please call our office during normal business hours [phone] (8-5:00 M-F)  If a referral was made to a [facility] and you don't get a call from central scheduling please call [phone].  If a Mammogram was ordered for you at [facility] call [phone] to schedule or change your appointment.  If you had an XRay/CT/Ultrasound/MRI ordered the number is [phone] to schedule or change your radiology appointment.   Medical Concerns:  If you have urgent medical concerns please call [phone] at any time of the day.

## 2017-10-04 NOTE — PROGRESS NOTES
Preceptor Attestation:   Patient seen and discussed with the resident. Assessment and plan reviewed with resident and agreed upon.   Supervising Physician:  Caitlyn Christian MD  [facility]

## 2017-10-05 ENCOUNTER — TELEPHONE (OUTPATIENT)
Dept: OBGYN | Facility: CLINIC | Age: 37
End: 2017-10-05

## 2017-10-05 NOTE — TELEPHONE ENCOUNTER
Confirmed c/section date, time and location (int) 10/19/17 with arrival time at 8:30a.m with nothing to eat eight hours before scheduled c/section time and clear liquids up to two hours before scheduled c/section time.      to complete the following fields:            CHECKLIST     Google Calendar : Yes     Resident notified: Not Applicable     Clinic schedule blocked:  Not Applicable    Patient notified:Yes      Pre op information sent: Yes     Given to patient over the phone.Yes    Comments:

## 2017-10-11 ENCOUNTER — OFFICE VISIT (OUTPATIENT)
Dept: FAMILY MEDICINE | Facility: CLINIC | Age: 37
End: 2017-10-11

## 2017-10-11 VITALS
HEART RATE: 91 BPM | TEMPERATURE: 98.3 F | OXYGEN SATURATION: 97 % | WEIGHT: 213.6 LBS | SYSTOLIC BLOOD PRESSURE: 107 MMHG | BODY MASS INDEX: 38.45 KG/M2 | RESPIRATION RATE: 16 BRPM | DIASTOLIC BLOOD PRESSURE: 70 MMHG

## 2017-10-11 DIAGNOSIS — O09.529 HIGH-RISK PREGNANCY, ELDERLY MULTIGRAVIDA, UNSPECIFIED TRIMESTER: Primary | ICD-10-CM

## 2017-10-11 DIAGNOSIS — O36.63X0 MACROSOMIA OF FETUS AFFECTING MANAGEMENT OF MOTHER IN THIRD TRIMESTER, SINGLE OR UNSPECIFIED FETUS: ICD-10-CM

## 2017-10-11 NOTE — PROGRESS NOTES
Results for orders placed or performed in visit on 10/11/17   US OB LIMITED FOLLOW UP/REPEAT (IN CLINIC)    Narrative    2nd/3rd Trimester Ultrasound Report    CONCLUSIONS:   EGA by LMP 37w6d   STALIN by LMP :Oct 26, 2017  EGA by this U/S: 23ays1o (+/- 7days): STALIN by this  U/S: 10/16/17              EFW 4021g, 8lb 14oz  Weight Percentile: >97%tile  Follow up: Routine follow up as needed.  PCP: Bhavna Mena  Physician/Sonographer: Ernestina Watkins     Indications: Size greater than dates   LMP: Patient's last menstrual period was 2017.       FHR: 138 BPM  Fluid: normal (5-24)  STEPHANIE: 20.47  Placenta Location: Anterior  Fetal number:1  Presentation: Cephalic  Technique: Transabdominal  Machine: [employer] Pro 5      MEASUREMENTS:([place])  BPD: 9.57 cm   39wks 1d    HC:    35.11 cm  40wks 6d  - 3 weeks ahead   AC:    37.13 cm  41wks 0d -3 weeks ahead  FL:     7.47 cm   38wks 1d                   Ernestina Watkins, RDMS RVT  Attestation of Reviewer.  I reviewed the images and agree with with interpretation above.  Yeison Corley MD

## 2017-10-11 NOTE — LETTER
October 12, 2017      Marti Vale  [address] [facility] [facility] [address]        Dear Marti,    Thank you for getting your care at [facility]. Please see below for your test results.    Resulted Orders   Group B strep PCR (GBS)   Result Value Ref Range    Group B Strep PCR Spec Miguel Vaginal Rectal     Group B Strep PCR Positive (A) NEG^Negative      Comment:      Positive: GBS DNA detected, presumed positive for GBS.  Assay performed on incubated broth culture of specimen using [employer] real-time   PCR.       This test indicates that you will need antibiotics during labor.    If you have any concerns about these results please call and leave a message for me or send a [employer] message to the clinic.    Sincerely,    Bhavna Mena MD

## 2017-10-11 NOTE — NURSING NOTE
name: Brielle Spring  Language: [place] citizen  Agency: [employer]  Phone number: [phone]  Joya Klein

## 2017-10-11 NOTE — PROGRESS NOTES
Return OB visit  35-39 weeks    Subjective:   Marti is a 37 year old  female at 37w6d who returns for prenatal care. STALIN Oct 26, 2017.  - Concerns today: none  - Patient reports no vaginal bleeding, no leakage of fluid. Contractions, yes, irregular for last 3 days. Fetal movement is present.   - No vaginal discharge. No dysuria.   - No headache, vision changes, lower extremity swelling, upper abdominal pain, chest pain, shortness of breath.   - Mood has been tired.    Last saw OB/GYN 17 Dr. Mccann and discussed  with planned   for 39+0 weeks (10/19). History of high spinal necessitating conversion to GETA with both of her last two c-sections.     Objective:   /70  Pulse 91  Temp 98.3  F (36.8  C) (Oral)  Resp 16  Wt 213 lb 9.6 oz (96.9 kg)  LMP 2017  SpO2 97%  BMI 38.45 kg/m2   Const: No distress  Abd: Gravid.   See flowsheet for FH, FHTs, fetal presentation, EFW, edema.     Prenatal labs:   Hemoglobin   Date Value Ref Range Status   2017 13.0 11.7 - 15.7 g/dL Final       Assessment & plan:   IUP at 37w6d weeks by LMP consistent with first trimester ultrasound.     - Pregnancy complicated by: 3 previous c-sections with planned  this pregnancy on 10/19/17 at 39+0  - Prenatal labs reviewed. Patient did not pass 1 hour GCT, but passed her 3 hour GTT. Fetal survey showed no abnormalities requiring follow up ultrasound.   - GBS obtained today. 3rd trimester Hb obtained , wnl.   - Patient is measuring not appropriately for gestational age, measuring at 42cm today. Pregnancy weight gain has been 32 lb 9.6 oz (14.8 kg) to date, which is adequate.   - Patient plans to breast feed and use IUD for contraception after delivery.   - Discussed circumcision: yes  - Discussed  care & plan for vaccinations.   - Provided counseling regarding labor signs, hospital readiness (transportation, bags packed), pain control options during labor.   - Patient will  continue taking prenatal vitamins and avoiding cigarettes & alcohol. Tdap and flu shot have been given.     - Return to clinic in 1 week.     - Specific concerns addressed today:   (O09.529) High-risk pregnancy, elderly multigravida, unspecified trimester  (primary encounter diagnosis)  Comment: spoke with MFM and was informed that even with scheduled , ACOG recommends universal GBS screening. Will not change scheduled  date at this time due to macrosomia. However, if patient starts to have regular contractions, she will need to go to L&D triage asap to be evaluated and possibly have early  if indicated.   Plan:  Group B strep PCR (GBS)   Informed patient of above.            (O36.63X0) Macrosomia of fetus affecting management of mother in third trimester, single or unspecified fetus  Comment: measuring 97%tile on US 10/11 at 37w6d. STEPHANIE 20. EFW [id number].   Plan: see above notation.     Options for treatment and follow-up care were reviewed with the patient and/or guardian. Marti Vale and/or guardian engaged in the decision making process and verbalized understanding of the options discussed and agreed with the final plan.    Marisela Anaya MD  [facility]  [phone]

## 2017-10-11 NOTE — PROGRESS NOTES
Preceptor Attestation:   Patient seen and discussed with the resident.   Assessment and plan reviewed with resident and agreed upon.   Supervising Physician:  Andrew Madrid MD  [facility]

## 2017-10-11 NOTE — MR AVS SNAPSHOT
After Visit Summary   10/11/2017    Marti Vale    MRN: [id number]           Patient Information     Date Of Birth          1980        Visit Information        Provider Department      10/11/2017 11:00 AM Marisela Anaya MD [facility]        Today's Diagnoses     High-risk pregnancy, elderly multigravida, unspecified trimester    -  1    Macrosomia of fetus affecting management of mother in third trimester, single or unspecified fetus           Follow-ups after your visit        Follow-up notes from your care team     Return in about 1 week (around 10/18/2017) for Routine Visit.      Your next 10 appointments already scheduled     Oct 17, 2017  2:40 PM CDT   RETURN OB with [facility] ([facility])    [address],  [address]  [facility] [address]   [phone]            Oct 19, 2017   Procedure with Dalila Hensley MD   [facility] (--)    [address] [facility] [address] [phone]              Who to contact     Please call your clinic at [phone] to:    Ask questions about your health    Make or cancel appointments    Discuss your medicines    Learn about your test results    Speak to your doctor   If you have compliments or concerns about an experience at your clinic, or if you wish to file a complaint, please contact [facility] Patient Relations at [phone] or email us at [email or web address]         Additional Information About Your Visit        MyChart Information     Servoy is an electronic gateway that provides easy, online access to your medical records. With Servoy, you can request a clinic appointment, read your test results, renew a prescription or communicate with your care team.     To sign up for Servoy visit the website at [email or web address]   You will be asked to enter the access code listed below, as well as  some personal information. Please follow the directions to create your username and password.     Your access code is: [email or web address]  Expires: 2017  3:20 PM     Your access code will  in 90 days. If you need help or a new code, please contact your [facility] or call [phone] for assistance.        Care EveryWhere ID     This is your Care EveryWhere ID. This could be used by other organizations to access your [place] medical records  [id number]        Your Vitals Were     Pulse Temperature Respirations Last Period Pulse Oximetry BMI (Body Mass Index)    91 98.3  F (36.8  C) (Oral) 16 2017 97% 38.45 kg/m2       Blood Pressure from Last 3 Encounters:   10/11/17 107/70   10/04/17 111/76   17 117/72    Weight from Last 3 Encounters:   10/11/17 213 lb 9.6 oz (96.9 kg)   10/04/17 213 lb 3.2 oz (96.7 kg)   17 211 lb 6.4 oz (95.9 kg)              We Performed the Following     Group B strep PCR (GBS)        Primary Care Provider Office Phone # Fax #    Bhavna Mena [phone]       [address] [id number]        Equal Access to Services     CHEPE OBRIEN : Hadii ehsan gutierrezo Sochiquita, waaxda luqadaha, qaybta kaalmada adeegyada, wesley matos . So [facility] [phone].    ATENCIÓN: Si habla español, tiene a andre disposición servicios gratuitos de asistencia lingüística. Llame al [phone].    We comply with applicable federal civil rights laws and [place] laws. We do not discriminate on the basis of race, color, national origin, age, disability, sex, sexual orientation, or gender identity.            Thank you!     Thank you for choosing [facility]  for your care. Our goal is always to provide you with excellent care. Hearing back from our patients is one way we can continue to improve our services. Please take a few minutes to complete the written survey that you may  receive in the mail after your visit with us. Thank you!             Your Updated Medication List - Protect others around you: Learn how to safely use, store and throw away your medicines at www.disposemymeds.org.          This list is accurate as of: 10/11/17 11:59 PM.  Always use your most recent med list.                   Brand Name Dispense Instructions for use Diagnosis    acetaminophen 325 MG tablet    TYLENOL    100 tablet    Take 1-2 tablets (325-650 mg) by mouth every 4 hours as needed for mild pain or fever Reported on 5/15/2017    Healthcare maintenance       alum & mag hydroxide-simethicone 200-200-20 MG/5ML Susp suspension    MYLANTA/MAALOX    355 mL    Take 30 mLs by mouth every 6 hours as needed for heartburn    Gastroesophageal reflux disease, esophagitis presence not specified       Cholecalciferol 4000 UNITS Caps     30 capsule    Take 4,000 Units by mouth daily    Vitamin D deficiency       PRENATAL 19 Chew     90 tablet    Take 1 tablet by mouth daily    Pregnancy examination or test, positive result       ranitidine 300 MG tablet    ZANTAC    30 tablet    Take 1 tablet (300 mg) by mouth At Bedtime    Gastroesophageal reflux disease without esophagitis       SEA-BAND ADULT Belkys     1 each    1 Device as needed    Nausea       senna 8.6 MG tablet    SENOKOT    30 tablet    Take 1 tablet by mouth daily    Slow transit constipation

## 2017-10-12 LAB
GP B STREP DNA SPEC QL NAA+PROBE: POSITIVE
SPECIMEN SOURCE: ABNORMAL

## 2017-10-15 LAB
BACTERIA SPEC CULT: ABNORMAL
SPECIMEN SOURCE: ABNORMAL

## 2017-10-17 ENCOUNTER — ANESTHESIA EVENT (OUTPATIENT)
Dept: OBGYN | Facility: CLINIC | Age: 37
End: 2017-10-17
Payer: MEDICAID

## 2017-10-17 ENCOUNTER — TELEPHONE (OUTPATIENT)
Dept: OBGYN | Facility: CLINIC | Age: 37
End: 2017-10-17

## 2017-10-17 ENCOUNTER — OFFICE VISIT (OUTPATIENT)
Dept: FAMILY MEDICINE | Facility: CLINIC | Age: 37
End: 2017-10-17

## 2017-10-17 VITALS
HEART RATE: 86 BPM | TEMPERATURE: 98 F | DIASTOLIC BLOOD PRESSURE: 71 MMHG | SYSTOLIC BLOOD PRESSURE: 103 MMHG | RESPIRATION RATE: 18 BRPM | OXYGEN SATURATION: 99 %

## 2017-10-17 DIAGNOSIS — Z98.891 PREVIOUS CESAREAN SECTION: Primary | ICD-10-CM

## 2017-10-17 DIAGNOSIS — Z98.891 PREVIOUS CESAREAN SECTION: ICD-10-CM

## 2017-10-17 DIAGNOSIS — O36.63X0 MACROSOMIA OF FETUS AFFECTING MANAGEMENT OF MOTHER IN THIRD TRIMESTER, SINGLE OR UNSPECIFIED FETUS: ICD-10-CM

## 2017-10-17 DIAGNOSIS — O09.529 HIGH-RISK PREGNANCY, ELDERLY MULTIGRAVIDA, UNSPECIFIED TRIMESTER: Primary | ICD-10-CM

## 2017-10-17 LAB
ERYTHROCYTE [DISTWIDTH] IN BLOOD BY AUTOMATED COUNT: 13.8 % (ref 10–15)
HCT VFR BLD AUTO: 38.9 % (ref 35–47)
HGB BLD-MCNC: 12.8 G/DL (ref 11.7–15.7)
MCH RBC QN AUTO: 27.5 PG (ref 26.5–33)
MCHC RBC AUTO-ENTMCNC: 32.9 G/DL (ref 31.5–36.5)
MCV RBC AUTO: 84 FL (ref 78–100)
PLATELET # BLD AUTO: 285 10E9/L (ref 150–450)
RBC # BLD AUTO: 4.66 10E12/L (ref 3.8–5.2)
T PALLIDUM IGG+IGM SER QL: NEGATIVE
WBC # BLD AUTO: 6.7 10E9/L (ref 4–11)

## 2017-10-17 PROCEDURE — 86923 COMPATIBILITY TEST ELECTRIC: CPT | Performed by: OBSTETRICS & GYNECOLOGY

## 2017-10-17 PROCEDURE — 86850 RBC ANTIBODY SCREEN: CPT | Performed by: OBSTETRICS & GYNECOLOGY

## 2017-10-17 PROCEDURE — 86780 TREPONEMA PALLIDUM: CPT | Performed by: OBSTETRICS & GYNECOLOGY

## 2017-10-17 PROCEDURE — 86900 BLOOD TYPING SEROLOGIC ABO: CPT | Performed by: OBSTETRICS & GYNECOLOGY

## 2017-10-17 PROCEDURE — 36415 COLL VENOUS BLD VENIPUNCTURE: CPT | Performed by: OBSTETRICS & GYNECOLOGY

## 2017-10-17 PROCEDURE — 85027 COMPLETE CBC AUTOMATED: CPT | Performed by: OBSTETRICS & GYNECOLOGY

## 2017-10-17 PROCEDURE — 86901 BLOOD TYPING SEROLOGIC RH(D): CPT | Performed by: OBSTETRICS & GYNECOLOGY

## 2017-10-17 NOTE — PROGRESS NOTES
Preceptor Attestation:   Patient seen and discussed with the resident. Assessment and plan reviewed with resident and agreed upon.   Supervising Physician:  Bhavna Calles MD  [facility]

## 2017-10-17 NOTE — PROGRESS NOTES
Return OB visit  35-39 weeks    Subjective:   Marti is a 37 year old  female at 38w5d who returns for prenatal care. STALIN Oct 26, 2017.  - Concerns today: none  - Patient reports no vaginal bleeding, no leakage of fluid. Contractions none. Fetal movement is present.   - No vaginal discharge. No dysuria.   - No headache, vision changes, lower extremity swelling, upper abdominal pain, chest pain, shortness of breath.   - Mood has been good.    Objective:   /71  Pulse 86  Temp 98  F (36.7  C) (Oral)  Resp 18  LMP 2017  SpO2 99%  Breastfeeding? No   Const: No distress  Abd: Gravid.   See flowsheet for FH, FHTs, fetal presentation, EFW, edema.   FHT: 140  FH: 45    Prenatal labs:   Hemoglobin   Date Value Ref Range Status   10/17/2017 12.8 11.7 - 15.7 g/dL Final       Assessment & plan:   IUP at 38w5d weeks by LMP, consistent with first trimester ultrasound.     - Pregnancy complicated by: 3 previous c-sections with planned  this pregnancy on 10/19/17 at 39w0d and size vs dates discrepancy  - Prenatal labs reviewed. Patient did not pass 1 hour GCT, but passed her 3 hour GTT. Fetal survey showed no abnormalities requiring follow up ultrasound.   - GBS obtained 10/11. 3rd trimester Hb obtained , wnl.   - Patient is measuring not appropriately for gestational age, measuring at 45 cm today. Pregnancy weight gain has been 32 lb 9.6 oz (14.8 kg) to date, which is adequate.  US performed 10/11 that showed EFW 8 lb 14 oz  -  scheduled for 10/19.  - Patient plans to breast feed and use IUD for contraception after delivery.   - Discussed circumcision: yes  - Discussed  care & plan for vaccinations.   - Provided counseling regarding labor signs, hospital readiness (transportation, bags packed), pain control options during labor.   - Patient will continue taking prenatal vitamins and avoiding cigarettes & alcohol. Tdap and flu shot have been given.   - Return to clinic for  post-partum check 2 weeks after delivery    - Specific concerns addressed today:     Marti was seen today for prenatal care.  Patient is high risk, elderly multigravida with 3 prior c-sections and size vs dates discrepancy.  On U/S performed 10/11, EFW was 8 lb 14 oz and STEPHANIE was 20.  Today, patient continues to have size > dates.  She is scheduled for  10/19.  Will plan for follow up after .    Diagnoses and all orders for this visit:    High-risk pregnancy, elderly multigravida, unspecified trimester    Previous  section    Macrosomia of fetus affecting management of mother in third trimester, single or unspecified fetus    Options for treatment and follow-up care were reviewed with the patient and/or guardian. Marti Vale and/or guardian engaged in the decision making process and verbalized understanding of the options discussed and agreed with the final plan.    Bhavna Mena M.D.  PGY-3, Family Medicine

## 2017-10-17 NOTE — TELEPHONE ENCOUNTER
Received call from lab that pt is there for labs prior to  but orders are needed. Advised the labs are what is listed at 'sign and held'. Lab agreed and will draw these.

## 2017-10-17 NOTE — MR AVS SNAPSHOT
After Visit Summary   10/17/2017    Marti Vale    MRN: [id number]           Patient Information     Date Of Birth          1980        Visit Information        Provider Department      10/17/2017 2:40 PM Bhavna Mena MD [facility]        Today's Diagnoses     High-risk pregnancy, elderly multigravida, unspecified trimester    -  1    Previous  section        Macrosomia of fetus affecting management of mother in third trimester, single or unspecified fetus           Follow-ups after your visit        Follow-up notes from your care team     Return in about 1 week (around 10/24/2017) for Routine Visit.      Your next 10 appointments already scheduled     Oct 19, 2017   Procedure with Dalila Hensley MD   [facility] (--)    [address] [facility] [address] [phone]              Who to contact     Please call your clinic at [phone] to:    Ask questions about your health    Make or cancel appointments    Discuss your medicines    Learn about your test results    Speak to your doctor   If you have compliments or concerns about an experience at your clinic, or if you wish to file a complaint, please contact [facility] Patient Relations at [phone] or email us at [email or web address]         Additional Information About Your Visit        MyChart Information     DNAnexus is an electronic gateway that provides easy, online access to your medical records. With DNAnexus, you can request a clinic appointment, read your test results, renew a prescription or communicate with your care team.     To sign up for Gooddlert visit the website at [email or web address]   You will be asked to enter the access code listed below, as well as some personal information. Please follow the directions to create your username and password.     Your access code is: [email or web address]  Expires: 2017  3:20 PM     Your access  code will  in 90 days. If you need help or a new code, please contact your [facility] or call [phone] for assistance.        Care EveryWhere ID     This is your Care EveryWhere ID. This could be used by other organizations to access your [place] medical records  [id number]        Your Vitals Were     Pulse Temperature Respirations Last Period Pulse Oximetry Breastfeeding?    86 98  F (36.7  C) (Oral) 18 2017 99% No       Blood Pressure from Last 3 Encounters:   10/17/17 103/71   10/11/17 107/70   10/04/17 111/76    Weight from Last 3 Encounters:   10/11/17 213 lb 9.6 oz (96.9 kg)   10/04/17 213 lb 3.2 oz (96.7 kg)   17 211 lb 6.4 oz (95.9 kg)              Today, you had the following     No orders found for display       Primary Care Provider Office Phone # Fax #    Bhavna Milana Mena [phone]       [address] [address]        Equal Access to Services     AVELINO [facility] Hospital[facility]FF : Hadii aad ku hadasho Soomaali, waaxda luqadaha, qaybta kaalmada adeegyanohemy, wesley matos . So [facility] [phone].    ATENCIÓN: Si habla español, tiene a andre disposición servicios gratuitos de asistencia lingüística. LlGalion Community Hospital [phone].    We comply with applicable federal civil rights laws and [place] laws. We do not discriminate on the basis of race, color, national origin, age, disability, sex, sexual orientation, or gender identity.            Thank you!     Thank you for choosing [facility]  for your care. Our goal is always to provide you with excellent care. Hearing back from our patients is one way we can continue to improve our services. Please take a few minutes to complete the written survey that you may receive in the mail after your visit with us. Thank you!             Your Updated Medication List - Protect others around you: Learn how to safely use, store and throw away your medicines at  www.disposemymeds.org.          This list is accurate as of: 10/17/17 11:59 PM.  Always use your most recent med list.                   Brand Name Dispense Instructions for use Diagnosis    acetaminophen 325 MG tablet    TYLENOL    100 tablet    Take 1-2 tablets (325-650 mg) by mouth every 4 hours as needed for mild pain or fever Reported on 5/15/2017    Healthcare maintenance       alum & mag hydroxide-simethicone 200-200-20 MG/5ML Susp suspension    MYLANTA/MAALOX    355 mL    Take 30 mLs by mouth every 6 hours as needed for heartburn    Gastroesophageal reflux disease, esophagitis presence not specified       Cholecalciferol 4000 UNITS Caps     30 capsule    Take 4,000 Units by mouth daily    Vitamin D deficiency       PRENATAL 19 Chew     90 tablet    Take 1 tablet by mouth daily    Pregnancy examination or test, positive result       ranitidine 300 MG tablet    ZANTAC    30 tablet    Take 1 tablet (300 mg) by mouth At Bedtime    Gastroesophageal reflux disease without esophagitis       SEA-BAND ADULT Belkys     1 each    1 Device as needed    Nausea       senna 8.6 MG tablet    SENOKOT    30 tablet    Take 1 tablet by mouth daily    Slow transit constipation

## 2017-10-18 NOTE — H&P
[facility]  OB History and Physical      Marti Vale MRN# [id number]   Age: 37 year old YOB: 1980     CC:  RLTCS    HPI:  Ms. Marti Vale is a 37 year old  at 39w0d by LMP c/w 8+6wk US, who presents for RLTCS d/t h/o 3 prior CS.  She denies contractions, vaginal bleeding, and loss of fluid.   + normal fetal movement.    Pregnancy Complications:  - 3 prior CS, last op note minimal fascial adhesions and minimal pelvic adhesions  - AMA, declined 1st tri screen, nl anatomy US  - Suspected fetal macrosomia, 97%tile on US 10/11 at 37w6d. STEPHANIE 20. EFW [id number].   - Possible latent TB  - High spinal w/ last 2 CS requiring general anesthesia  - failed GCT, passed GTT    Placenta anterior & posterior on the left w/ cvx not well visualized, no definitive placenta previa.    Prenatal Labs:   A positive, antibody negative  Rubella immune  HIV neg  Anti treponema neg  HBSAg neg  Gc/chl neg  ; GTT 79/161/165/121  28 week Hgb 13    GBS Status:   Lab Results   Component Value Date    GBS Positive (A) 10/11/2017       Ultrasounds  Anatomy US :  Anterior placenta, and posterior on the left. Cervix not well visualized but not definitively placenta previa  Normal anatomy; nose not well visualized    OB History  Obstetric History       T5      L5     SAB0   TAB0   Ectopic0   Multiple0   Live Births0       # Outcome Date GA Lbr Rodrigue/2nd Weight Sex Delivery Anes PTL Lv   8 Current            7 2016           6 2015           5 Term 2009     CS-LTranv      4 Term 2008     CS-LTranv      3 Term 2006     CS-LTranv      2 Term      Vag-Spont      1 Term                   PMHx:   Past Medical History:   Diagnosis Date     Anemia      PSHx:   Past Surgical History:   Procedure Laterality Date     DILATION AND CURETTAGE SUCTION N/A 2015    Procedure: DILATION AND CURETTAGE SUCTION;  Surgeon: Mita Wadsworth MD;  Location: [facility]      GYN SURGERY       x3     Meds:   Prescriptions Prior to Admission   Medication Sig Dispense Refill Last Dose     senna (SENOKOT) 8.6 MG tablet Take 1 tablet by mouth daily 30 tablet 3 10/18/2017 at Unknown time     alum & mag hydroxide-simethicone (MYLANTA/MAALOX) 200-200-20 MG/5ML SUSP suspension Take 30 mLs by mouth every 6 hours as needed for heartburn 355 mL 0 10/18/2017 at Unknown time     acetaminophen (TYLENOL) 325 MG tablet Take 1-2 tablets (325-650 mg) by mouth every 4 hours as needed for mild pain or fever Reported on 5/15/2017 100 tablet 0 10/18/2017 at Unknown time     Cholecalciferol 4000 UNITS CAPS Take 4,000 Units by mouth daily 30 capsule 3 10/18/2017 at Unknown time     ranitidine (ZANTAC) 300 MG tablet Take 1 tablet (300 mg) by mouth At Bedtime 30 tablet 1 10/18/2017 at Unknown time     Prenatal Vit-Fe Fumarate-FA (PRENATAL 19) CHEW Take 1 tablet by mouth daily 90 tablet 3 10/18/2017 at Unknown time     Acupressure Device (SEA-BAND ADULT) BHARGAVI 1 Device as needed 1 each 0 Unknown at Unknown time     Allergies:  No Known Allergies   FmHx:   Family History   Problem Relation Age of Onset     DIABETES No family hx of      Coronary Artery Disease No family hx of      Hypertension No family hx of      Hyperlipidemia No family hx of      CEREBROVASCULAR DISEASE No family hx of      Breast Cancer No family hx of      Colon Cancer No family hx of      Prostate Cancer No family hx of      Other Cancer No family hx of      Depression No family hx of      Anxiety Disorder No family hx of      MENTAL ILLNESS No family hx of      Crohn Disease No family hx of      Ulcerative Colitis No family hx of      SocHx: She denies any tobacco, alcohol, or other drug use during this pregnancy.    ROS:   Complete 10-point ROS negative except as noted in HPI.She denies headache, blurry vision, chest pain, shortness of breath, RUQ pain, nausea, vomiting, dysuria, hematuria or extremity edema.    PE:  Vit:   Patient  Vitals for the past 4 hrs:   BP Temp Temp src Resp   10/19/17 0925 104/68 98.2  F (36.8  C) Oral 16      Gen: Well-appearing, NAD, comfortable   CV: rrr, no mrg   Pulm: Ctab, no wheezes or crackles   Abd: Soft, gravid, non-tender, +BS   Ext: 1+ LE edema b/l    Pres:  vtx by BSUS  EFW:  9# by Leopold's        FHT: Baseline 140, mod variability, + accels, no decels  [place]: Ctx none    Assessment  Ms. Marti Vale is a 37 year old , at 39w0d by LMP c/w 8+6wk US, who presents for RLTCS.    Plan  Admit to L&D, to OR for RLTCS  Discussed indications, alternatives, benefits, and risks of surgery including bleeding requiring uterotonics, transfusion or additional procedures including removal of uterus; injury to bowel, bladder, blood vessels, nerves, uterus, fallopian tubes, ovaries, baby. Questions and concerns were addressed and consent was signed.  - NPO, 2g Ancef prior to incision  - T&S, CBC, treponema screen obtained 10/17 - Hgb 12.8    #H/o high spinal x2: To be discussed w/ anesthesia    #Anterior placenta, h/o CS x3, no detailed imaging: Increased risk for placenta accreta.  - T&C x 2 units  - consented for hysterectomy in case of abnormal placentation. She expressed understanding of this precaution    #Possible latent TB: CXR pp    #FWB: Category 1 FHT. Will check spot FHT prior to prep & drape  - GBS +, no abx beyond nl ppx  - EFW 9lbs, vtx by BSUS, placenta anterior    #PNC: Rh +, Rubella immune, GCT failed/passed GTT, anatomy wnl  - contraception - Copper IUD  - S/p flu and TDAP  - planning breast feeding    The patient was discussed with Dr. Hensley who is in agreement with the treatment plan.    Barbie Marquez MD  OBGYN PGY-2  10/19/17 10:45 AM     I have seen, examined, and counseled the patient. I agree with the excellent note, assessment, and plan.   Dalila Hensley

## 2017-10-19 ENCOUNTER — ANESTHESIA (OUTPATIENT)
Dept: OBGYN | Facility: CLINIC | Age: 37
End: 2017-10-19
Payer: MEDICAID

## 2017-10-19 ENCOUNTER — HOSPITAL ENCOUNTER (INPATIENT)
Facility: CLINIC | Age: 37
LOS: 3 days | Discharge: HOME | End: 2017-10-22
Attending: OBSTETRICS & GYNECOLOGY | Admitting: OBSTETRICS & GYNECOLOGY
Payer: MEDICAID

## 2017-10-19 ENCOUNTER — OFFICE VISIT (OUTPATIENT)
Dept: INTERPRETER SERVICES | Facility: CLINIC | Age: 37
End: 2017-10-19

## 2017-10-19 ENCOUNTER — SURGERY (OUTPATIENT)
Age: 37
End: 2017-10-19

## 2017-10-19 DIAGNOSIS — Z22.7 LATENT TUBERCULOSIS: ICD-10-CM

## 2017-10-19 DIAGNOSIS — Z98.891 S/P CESAREAN SECTION: Primary | ICD-10-CM

## 2017-10-19 LAB
ABO + RH BLD: NORMAL
ABO + RH BLD: NORMAL
BLD GP AB SCN SERPL QL: NORMAL
BLD PROD TYP BPU: NORMAL
BLOOD BANK CMNT PATIENT-IMP: NORMAL
NUM BPU REQUESTED: 2
SPECIMEN EXP DATE BLD: NORMAL

## 2017-10-19 PROCEDURE — 25000128 H RX IP 250 OP 636: Performed by: OBSTETRICS & GYNECOLOGY

## 2017-10-19 PROCEDURE — 88307 TISSUE EXAM BY PATHOLOGIST: CPT | Performed by: STUDENT IN AN ORGANIZED HEALTH CARE EDUCATION/TRAINING PROGRAM

## 2017-10-19 PROCEDURE — 36000057 ZZH SURGERY LEVEL 3 1ST 30 MIN - UMMC: Performed by: OBSTETRICS & GYNECOLOGY

## 2017-10-19 PROCEDURE — 37000008 ZZH ANESTHESIA TECHNICAL FEE, 1ST 30 MIN: Performed by: OBSTETRICS & GYNECOLOGY

## 2017-10-19 PROCEDURE — 25000132 ZZH RX MED GY IP 250 OP 250 PS 637: Performed by: STUDENT IN AN ORGANIZED HEALTH CARE EDUCATION/TRAINING PROGRAM

## 2017-10-19 PROCEDURE — 37000009 ZZH ANESTHESIA TECHNICAL FEE, EACH ADDTL 15 MIN: Performed by: OBSTETRICS & GYNECOLOGY

## 2017-10-19 PROCEDURE — 25000132 ZZH RX MED GY IP 250 OP 250 PS 637

## 2017-10-19 PROCEDURE — 71000014 ZZH RECOVERY PHASE 1 LEVEL 2 FIRST HR: Performed by: OBSTETRICS & GYNECOLOGY

## 2017-10-19 PROCEDURE — 25000125 ZZHC RX 250: Performed by: STUDENT IN AN ORGANIZED HEALTH CARE EDUCATION/TRAINING PROGRAM

## 2017-10-19 PROCEDURE — 25000125 ZZHC RX 250: Performed by: NURSE ANESTHETIST, CERTIFIED REGISTERED

## 2017-10-19 PROCEDURE — 40000170 ZZH STATISTIC PRE-PROCEDURE ASSESSMENT II: Performed by: OBSTETRICS & GYNECOLOGY

## 2017-10-19 PROCEDURE — 88307 TISSUE EXAM BY PATHOLOGIST: CPT | Mod: 26 | Performed by: STUDENT IN AN ORGANIZED HEALTH CARE EDUCATION/TRAINING PROGRAM

## 2017-10-19 PROCEDURE — 25000128 H RX IP 250 OP 636: Performed by: STUDENT IN AN ORGANIZED HEALTH CARE EDUCATION/TRAINING PROGRAM

## 2017-10-19 PROCEDURE — 27210794 ZZH OR GENERAL SUPPLY STERILE: Performed by: OBSTETRICS & GYNECOLOGY

## 2017-10-19 PROCEDURE — 25000128 H RX IP 250 OP 636

## 2017-10-19 PROCEDURE — 36000059 ZZH SURGERY LEVEL 3 EA 15 ADDTL MIN UMMC: Performed by: OBSTETRICS & GYNECOLOGY

## 2017-10-19 PROCEDURE — 12000032 ZZH R&B OB CRITICAL UMMC

## 2017-10-19 PROCEDURE — 25000128 H RX IP 250 OP 636: Performed by: NURSE ANESTHETIST, CERTIFIED REGISTERED

## 2017-10-19 PROCEDURE — 71000015 ZZH RECOVERY PHASE 1 LEVEL 2 EA ADDTL HR: Performed by: OBSTETRICS & GYNECOLOGY

## 2017-10-19 PROCEDURE — T1013 SIGN LANG/ORAL INTERPRETER: HCPCS | Mod: U3

## 2017-10-19 RX ORDER — CITRIC ACID/SODIUM CITRATE 334-500MG
SOLUTION, ORAL ORAL
Status: COMPLETED
Start: 2017-10-19 | End: 2017-10-19

## 2017-10-19 RX ORDER — SODIUM CHLORIDE, SODIUM LACTATE, POTASSIUM CHLORIDE, CALCIUM CHLORIDE 600; 310; 30; 20 MG/100ML; MG/100ML; MG/100ML; MG/100ML
INJECTION, SOLUTION INTRAVENOUS CONTINUOUS PRN
Status: DISCONTINUED | OUTPATIENT
Start: 2017-10-19 | End: 2017-10-19

## 2017-10-19 RX ORDER — OXYTOCIN/0.9 % SODIUM CHLORIDE 30/500 ML
100 PLASTIC BAG, INJECTION (ML) INTRAVENOUS CONTINUOUS
Status: DISCONTINUED | OUTPATIENT
Start: 2017-10-19 | End: 2017-10-22 | Stop reason: HOSPADM

## 2017-10-19 RX ORDER — CEFAZOLIN SODIUM 2 G/100ML
2 INJECTION, SOLUTION INTRAVENOUS
Status: COMPLETED | OUTPATIENT
Start: 2017-10-19 | End: 2017-10-19

## 2017-10-19 RX ORDER — LIDOCAINE 40 MG/G
CREAM TOPICAL
Status: DISCONTINUED | OUTPATIENT
Start: 2017-10-19 | End: 2017-10-19 | Stop reason: HOSPADM

## 2017-10-19 RX ORDER — MISOPROSTOL 200 UG/1
400 TABLET ORAL
Status: DISCONTINUED | OUTPATIENT
Start: 2017-10-19 | End: 2017-10-22 | Stop reason: HOSPADM

## 2017-10-19 RX ORDER — OXYCODONE HYDROCHLORIDE 5 MG/1
5-10 TABLET ORAL
Status: DISCONTINUED | OUTPATIENT
Start: 2017-10-19 | End: 2017-10-22 | Stop reason: HOSPADM

## 2017-10-19 RX ORDER — ONDANSETRON 2 MG/ML
4 INJECTION INTRAMUSCULAR; INTRAVENOUS EVERY 6 HOURS PRN
Status: DISCONTINUED | OUTPATIENT
Start: 2017-10-19 | End: 2017-10-22 | Stop reason: HOSPADM

## 2017-10-19 RX ORDER — LANOLIN 100 %
OINTMENT (GRAM) TOPICAL
Status: DISCONTINUED | OUTPATIENT
Start: 2017-10-19 | End: 2017-10-22 | Stop reason: HOSPADM

## 2017-10-19 RX ORDER — SODIUM CHLORIDE, SODIUM LACTATE, POTASSIUM CHLORIDE, CALCIUM CHLORIDE 600; 310; 30; 20 MG/100ML; MG/100ML; MG/100ML; MG/100ML
INJECTION, SOLUTION INTRAVENOUS
Status: COMPLETED
Start: 2017-10-19 | End: 2017-10-19

## 2017-10-19 RX ORDER — HYDROCORTISONE 2.5 %
CREAM (GRAM) TOPICAL 3 TIMES DAILY PRN
Status: DISCONTINUED | OUTPATIENT
Start: 2017-10-19 | End: 2017-10-22 | Stop reason: HOSPADM

## 2017-10-19 RX ORDER — SIMETHICONE 80 MG
80 TABLET,CHEWABLE ORAL 4 TIMES DAILY PRN
Status: DISCONTINUED | OUTPATIENT
Start: 2017-10-19 | End: 2017-10-22 | Stop reason: HOSPADM

## 2017-10-19 RX ORDER — LIDOCAINE 40 MG/G
CREAM TOPICAL
Status: DISCONTINUED | OUTPATIENT
Start: 2017-10-19 | End: 2017-10-22 | Stop reason: HOSPADM

## 2017-10-19 RX ORDER — DIPHENHYDRAMINE HYDROCHLORIDE 50 MG/ML
25 INJECTION INTRAMUSCULAR; INTRAVENOUS EVERY 6 HOURS PRN
Status: DISCONTINUED | OUTPATIENT
Start: 2017-10-19 | End: 2017-10-22 | Stop reason: HOSPADM

## 2017-10-19 RX ORDER — OXYTOCIN/0.9 % SODIUM CHLORIDE 30/500 ML
340 PLASTIC BAG, INJECTION (ML) INTRAVENOUS CONTINUOUS PRN
Status: DISCONTINUED | OUTPATIENT
Start: 2017-10-19 | End: 2017-10-22 | Stop reason: HOSPADM

## 2017-10-19 RX ORDER — NALOXONE HYDROCHLORIDE 0.4 MG/ML
.1-.4 INJECTION, SOLUTION INTRAMUSCULAR; INTRAVENOUS; SUBCUTANEOUS
Status: DISCONTINUED | OUTPATIENT
Start: 2017-10-19 | End: 2017-10-22 | Stop reason: HOSPADM

## 2017-10-19 RX ORDER — DIPHENHYDRAMINE HCL 25 MG
25 CAPSULE ORAL EVERY 6 HOURS PRN
Status: DISCONTINUED | OUTPATIENT
Start: 2017-10-19 | End: 2017-10-22 | Stop reason: HOSPADM

## 2017-10-19 RX ORDER — PROPOFOL 10 MG/ML
INJECTION, EMULSION INTRAVENOUS CONTINUOUS PRN
Status: DISCONTINUED | OUTPATIENT
Start: 2017-10-19 | End: 2017-10-19

## 2017-10-19 RX ORDER — KETOROLAC TROMETHAMINE 30 MG/ML
30 INJECTION, SOLUTION INTRAMUSCULAR; INTRAVENOUS EVERY 6 HOURS
Status: COMPLETED | OUTPATIENT
Start: 2017-10-19 | End: 2017-10-20

## 2017-10-19 RX ORDER — AMOXICILLIN 250 MG
1-2 CAPSULE ORAL 2 TIMES DAILY
Status: DISCONTINUED | OUTPATIENT
Start: 2017-10-19 | End: 2017-10-22 | Stop reason: HOSPADM

## 2017-10-19 RX ORDER — BISACODYL 10 MG
10 SUPPOSITORY, RECTAL RECTAL DAILY PRN
Status: DISCONTINUED | OUTPATIENT
Start: 2017-10-21 | End: 2017-10-20

## 2017-10-19 RX ORDER — CEFAZOLIN SODIUM 1 G/3ML
1 INJECTION, POWDER, FOR SOLUTION INTRAMUSCULAR; INTRAVENOUS SEE ADMIN INSTRUCTIONS
Status: DISCONTINUED | OUTPATIENT
Start: 2017-10-19 | End: 2017-10-19 | Stop reason: HOSPADM

## 2017-10-19 RX ORDER — ACETAMINOPHEN 325 MG/1
975 TABLET ORAL EVERY 8 HOURS
Status: COMPLETED | OUTPATIENT
Start: 2017-10-19 | End: 2017-10-22

## 2017-10-19 RX ORDER — IBUPROFEN 400 MG/1
400-800 TABLET, FILM COATED ORAL EVERY 6 HOURS PRN
Status: DISCONTINUED | OUTPATIENT
Start: 2017-10-19 | End: 2017-10-22 | Stop reason: HOSPADM

## 2017-10-19 RX ORDER — ACETAMINOPHEN 325 MG/1
650 TABLET ORAL EVERY 4 HOURS PRN
Status: DISCONTINUED | OUTPATIENT
Start: 2017-10-22 | End: 2017-10-22 | Stop reason: HOSPADM

## 2017-10-19 RX ORDER — SODIUM CHLORIDE, SODIUM LACTATE, POTASSIUM CHLORIDE, CALCIUM CHLORIDE 600; 310; 30; 20 MG/100ML; MG/100ML; MG/100ML; MG/100ML
INJECTION, SOLUTION INTRAVENOUS CONTINUOUS
Status: DISCONTINUED | OUTPATIENT
Start: 2017-10-19 | End: 2017-10-19 | Stop reason: HOSPADM

## 2017-10-19 RX ORDER — OXYTOCIN 10 [USP'U]/ML
10 INJECTION, SOLUTION INTRAMUSCULAR; INTRAVENOUS
Status: DISCONTINUED | OUTPATIENT
Start: 2017-10-19 | End: 2017-10-22 | Stop reason: HOSPADM

## 2017-10-19 RX ORDER — DEXTROSE, SODIUM CHLORIDE, SODIUM LACTATE, POTASSIUM CHLORIDE, AND CALCIUM CHLORIDE 5; .6; .31; .03; .02 G/100ML; G/100ML; G/100ML; G/100ML; G/100ML
INJECTION, SOLUTION INTRAVENOUS CONTINUOUS
Status: DISCONTINUED | OUTPATIENT
Start: 2017-10-19 | End: 2017-10-22 | Stop reason: HOSPADM

## 2017-10-19 RX ORDER — CITRIC ACID/SODIUM CITRATE 334-500MG
30 SOLUTION, ORAL ORAL
Status: COMPLETED | OUTPATIENT
Start: 2017-10-19 | End: 2017-10-19

## 2017-10-19 RX ORDER — HYDROXYZINE HYDROCHLORIDE 50 MG/1
50 TABLET, FILM COATED ORAL ONCE
Status: COMPLETED | OUTPATIENT
Start: 2017-10-19 | End: 2017-10-19

## 2017-10-19 RX ADMIN — SODIUM CITRATE AND CITRIC ACID MONOHYDRATE 30 ML: 500; 334 SOLUTION ORAL at 12:13

## 2017-10-19 RX ADMIN — OXYCODONE HYDROCHLORIDE 5 MG: 5 TABLET ORAL at 20:00

## 2017-10-19 RX ADMIN — SIMETHICONE CHEW TAB 80 MG 80 MG: 80 TABLET ORAL at 17:18

## 2017-10-19 RX ADMIN — CEFAZOLIN SODIUM 2 G: 2 INJECTION, SOLUTION INTRAVENOUS at 13:25

## 2017-10-19 RX ADMIN — OXYCODONE HYDROCHLORIDE 5 MG: 5 TABLET ORAL at 16:08

## 2017-10-19 RX ADMIN — OXYTOCIN-SODIUM CHLORIDE 0.9% IV SOLN 30 UNIT/500ML 100 ML/HR: 30-0.9/5 SOLUTION at 17:19

## 2017-10-19 RX ADMIN — PROPOFOL 20 MCG/KG/MIN: 10 INJECTION, EMULSION INTRAVENOUS at 13:31

## 2017-10-19 RX ADMIN — PHENYLEPHRINE HYDROCHLORIDE 200 MCG: 10 INJECTION, SOLUTION INTRAMUSCULAR; INTRAVENOUS; SUBCUTANEOUS at 13:13

## 2017-10-19 RX ADMIN — PHENYLEPHRINE HYDROCHLORIDE 0.5 MCG/KG/MIN: 10 INJECTION, SOLUTION INTRAMUSCULAR; INTRAVENOUS; SUBCUTANEOUS at 13:13

## 2017-10-19 RX ADMIN — ACETAMINOPHEN 975 MG: 325 TABLET, FILM COATED ORAL at 23:48

## 2017-10-19 RX ADMIN — SODIUM CHLORIDE, POTASSIUM CHLORIDE, SODIUM LACTATE AND CALCIUM CHLORIDE 1000 ML: 600; 310; 30; 20 INJECTION, SOLUTION INTRAVENOUS at 09:24

## 2017-10-19 RX ADMIN — SODIUM CHLORIDE, POTASSIUM CHLORIDE, SODIUM LACTATE AND CALCIUM CHLORIDE: 600; 310; 30; 20 INJECTION, SOLUTION INTRAVENOUS at 12:57

## 2017-10-19 RX ADMIN — Medication 30 ML: at 12:13

## 2017-10-19 RX ADMIN — KETOROLAC TROMETHAMINE 30 MG: 30 INJECTION, SOLUTION INTRAMUSCULAR at 21:17

## 2017-10-19 RX ADMIN — RANITIDINE HYDROCHLORIDE 50 MG: 25 INJECTION INTRAMUSCULAR; INTRAVENOUS at 12:09

## 2017-10-19 RX ADMIN — SENNOSIDES AND DOCUSATE SODIUM 2 TABLET: 8.6; 5 TABLET ORAL at 20:00

## 2017-10-19 RX ADMIN — SIMETHICONE CHEW TAB 80 MG 80 MG: 80 TABLET ORAL at 23:48

## 2017-10-19 RX ADMIN — SODIUM CHLORIDE, POTASSIUM CHLORIDE, SODIUM LACTATE AND CALCIUM CHLORIDE: 600; 310; 30; 20 INJECTION, SOLUTION INTRAVENOUS at 12:15

## 2017-10-19 RX ADMIN — KETOROLAC TROMETHAMINE 30 MG: 30 INJECTION, SOLUTION INTRAMUSCULAR at 15:01

## 2017-10-19 RX ADMIN — PHENYLEPHRINE HYDROCHLORIDE 200 MCG: 10 INJECTION, SOLUTION INTRAMUSCULAR; INTRAVENOUS; SUBCUTANEOUS at 13:15

## 2017-10-19 RX ADMIN — OXYCODONE HYDROCHLORIDE 5 MG: 5 TABLET ORAL at 23:48

## 2017-10-19 RX ADMIN — HYDROXYZINE HYDROCHLORIDE 50 MG: 50 TABLET, FILM COATED ORAL at 23:48

## 2017-10-19 RX ADMIN — ACETAMINOPHEN 975 MG: 325 TABLET, FILM COATED ORAL at 16:08

## 2017-10-19 RX ADMIN — Medication: at 13:35

## 2017-10-19 NOTE — IP AVS SNAPSHOT
MRN:[id number]                      After Visit Summary   10/19/2017    Marti Vale    MRN: [id number]           Thank you!     Thank you for choosing [facility] for your care. Our goal is always to provide you with excellent care. Hearing back from our patients is one way we can continue to improve our services. Please take a few minutes to complete the written survey that you may receive in the mail after you visit with us. Thank you!        Patient Information     Date Of Birth          1980        Designated Caregiver       Most Recent Value    Caregiver    Will someone help with your care after discharge? no      About your hospital stay     You were admitted on:  October 19, 2017 You last received care in the:  [facility]    You were discharged on:  October 22, 2017        Reason for your hospital stay       Maternity care                  Who to Call     For medical emergencies, please call 911.  For non-urgent questions about your medical care, please call your primary care provider or clinic, [phone]  For questions related to your surgery, please call your surgery clinic        Attending Provider     Provider Specialty    Dalila Hensley MD OB/Gyn       Primary Care Provider Office Phone # Fax #    Bhavna Milana Mena [phone]      After Care Instructions     Activity       Review discharge instructions            Diet       Resume previous diet            Discharge Instructions - Gestational diabetic patients       Gestational diabetic patients to follow up for fasting blood sugar and 2 hour 75gm glucose load at 6 weeks postpartum.            Discharge Instructions - Hypertensive disorders patients       High Blood pressure patients to follow up in clinic or via home care within one week for a blood pressure check            Discharge Instructions - Postpartum visit       Schedule postpartum visit with your provider and return to clinic in 1 week for  an incision check and in 6 weeks for routine visit and IUD insertion.                  Additional Services     Internal Medicine Referral                 Further instructions from your care team        Birth Discharge Instructions: Spanish  Waxqabadka    Garvey qaadin wax kabadan 10 roodal 6 asbuuc kadib qalliinka. Waydii qoyska iyo saxibadaa caawin markaad u [facility].     Garvey wadin gaadhi markaad qaadanayso kaniiniyada xanunka ee uu dhakhtarkaagu kuu qoro. Waxaad wadi kartaa gadhi haddii aad qaadanayso kaniiniyada xanuunka ee koontarka.     Lama ogala jimicsi adag ama howl culus 6 asbuuc. Garvey samayn wax dhib ku keeni radha meesha qalliinka lagu sameyay.    Garvey sobeida jiidin adoo aad u doconaya markaad isticmaalayso musqusha. Kooxdaada daryeelka ayaa waxay ku qori doonaan wax saxarada jilciya haddii ay dhibaato kaa haysato saxarada oo adag (caloosha oo fadhida) .    Ka taxadar meesha la qalay:    Meesha la qalay nadiif garvey ahaato hana qallalnaato    Garvey ku buuxin meesha la qalay biyo. Dabaal ama tuubooyinka biyaha kulul lama ogala ilaa uu qalliinku roger ahaanba bogsado. Waxaad isku furi kartaa tuubada qabayska haddii heerka biyuhu ay ka hoooseeyaan meesha qalliinka.    Dhaqidda kadib, meesha qalliinka si fican u qalaji. Sidoo kale ku aditi qalajinta maqarka u dhow meesha qaliinka ee ay istaaban karaan.     Ha isticmaalin wax subkid, jeel, wesly, looshin ama wax kale oo aad mariso meesha qalliinka.    Dharkaaga u xiri qaab wanaagsan si aysan cadaadis ugu samayn meesha la qalay  (tusaale ahaan fiiri say u sobeida jiidayso kastuumadaadu)     Haddii aad leedahay Qodabada Latolay, faraha ka qaad tolmada. Iyagaa iskood isaga dhacaya ama waxaad siibi kartaa asaf asbuuc kadib.    Waxaad arki kartaa cadad daniele oo catrina cad ah ama binki ah waana iska caadi. U taga [facility]:    Haddii ruma tolmu uu waynamilan ama uu lucila connor.    Haddii aad ku yeelato meesha la tolay jasmina, gypsy, ama wali cun.    Haddii  aad yeelato xanuun kordhaya oo xanuunkaagana aysan daawadu wax ka tarayn.     Haddii aad qabto qandho  100.4  F (38  C) am aka saraysa (heerkulka laga [facility]aada [facility] hoostiisa), oo qarqaryo leh ama aan lahayn     Meesha u dhow meesha la qalay ([facility] qalliinka) waxaad ka dareemi doontaa inaysan dareen lahayn. Edy waa caadi. Dareen la aantu waxay u dhamaan doontaa wax kayar sanad.     Gacmahaagga nadiifi:  Markasta dhaqa gacahaaga ka hor inta aadan taaban farjiga agagaarkiisa  iyo meesha la tolay. Edy waxay caawiniaysaa inuu yaraado infakshanku. Haddii gacmahaagu aysan wasakh lahayn, waxaad isticmaali kartaa aalkalo aad gacmaha ku tirtirto si aad u nadiifiso gacmahaaga. Cidiyahaaga nadiifi ooo jar.    Wac bixiyahaaga daryeelka caafimaaad haddii aad qabtid mid ka  mid [facility] cricket socda:    Haddii suufka dhiigga nuuga uu ku buuxsamo 1 saac gudihiis, ama aad aragto xinjiro dhiig ah oo ka wayn kubadda golafka.    Dhiig soconaya wax kabadan 6 asbuuc.    Haddii aad qabto dheecaan farjiga ka imaanaya oo  si xun u uraya.    Karienuun, nabar, majiirid daran oo aad ka dareeto qaybta hoose ee ubucda.    Kaadi badan oo dagdag ah oo markasta ku qabanaysa, ama gubasho aad dareento markaad kaajayso.    Lalabbo ama matag.    Guduudasho, barar, ama xanuun aad ka dareento xididada lugta.    ibaato kaalex haylatoya long amalex antoine.    Giuliano fink amalex naqaska oo ted mchugh.    Dhibaato ay brianna cooper aa walwal.   Haddii aad qabtid chucho suazoo josy altamirano, [facility] eugenia cevallos.     Haddii aad qabto thai ferris nofelipe alexandre.       Birth Discharge Instructions  Activity    Do not lift more than 10 pounds for 6 weeks after surgery. Ask family and friends for help when you need it.    Do not drive while taking pain pills prescribed by your doctor. You may drive if taking over-the-counter  pain pills.    No heavy exercise or activity for 6 weeks. Don t do anything that will put a strain on your surgery site.    Don t strain when using the toilet. Your care team may prescribe a stool softener if you have problems with your bowel movements (constipation).    To care for your incision:    Keep the incision clean and dry    Do not soak your incision in water. No swimming or hot tubs until your incision has fully healed. You may soak in the bathtub if the water level is below your incision.    After washing, dry your incision well. Include the skin that may come in contact with your incision.    Do not use any peroxide, gel, cream, lotion, or ointment on your incision.     Adjust your clothes to avoid pressure on your surgery site (check the elastic in your underwear for example)    If you have Steri-Strips, leave the small strips of tape in place. They will fall off on their own, or you can remove them after one week.    You may see a small amount of clear or pink drainage and this is normal. Check with your health care provider:    If the drainage increases or has an odor.    If you have swelling, redness, or a rash around the incision.    If you have increased pain and your pain medicine doesn t help    If you have a fever of 100.4  F (38  C) or higher (temperature taken under your tongue), with or without chills   The area around your incision (surgery wound) will feel numb. This is normal. The numbness should go away in less than a year.   Keep your hands clean:   Always wash your hands before touching your incision. This helps reduce your risk of infection. If your hands aren t dirty, you may use an alcohol hand-rub to clean your hands. Keep your nails clean and short.     Call your health care provider if you have any of these symptoms:    You soak a sanitary pad with blood within 1 hour, or you see blood clots larger than a golf ball.    Bleeding that lasts more than 6 weeks.    You have vaginal  discharge that smells bad.    Severe pain, cramping or tenderness in your lower belly area.    A more frequent or urgent need to urinate (pee), or it burns when you pee.    Nausea and vomiting.    Redness, swelling or pain around a vein in your leg.    Problems breastfeeding, or a red or painful area on your breast.    If you have chest pain and cough or are gasping for air.    Problems coping with sadness, anxiety, or depression.     If you have any concerns about hurting yourself of the baby, call your doctor right away.    You have questions or concerns after you return home.Postop  Birth Instructions    Activity       Do not lift more than 10 pounds for 6 weeks after surgery.  Ask family and friends for help when you need it.    No driving until you have stopped taking your pain medications (usually two weeks after surgery).    No heavy exercise or activity for 6 weeks.  Don't do anything that will put a strain on your surgery site.    Don't strain when using the toilet.  Your care team may prescribe a stool softener if you have problems with your bowel movements.     To care for your incision:       Keep the incision clean and dry.    Do not soak your incision in water. No swimming or hot tubs until it has fully healed. You may soak in the bathtub if the water level is below your incision.    Do not use peroxide, gel, cream, lotion, or ointment on your incision.    Adjust your clothes to avoid pressure on your surgery site (check the elastic in your underwear for example).     You may see a small amount of clear or pink drainage and this is normal.  Check with your health care provider:       If the drainage increases or has an odor.    If the incision reddens, you have swelling, or develop a rash.    If you have increased pain and the medicine we prescribed doesn't help.    If you have a fever above 100.4 F (38 C) with or without chills when placing thermometer under your tongue.   The area around your  incision (surgery wound), will feel numb.  This is normal. The numbness should go away in less than a year.     Keep your hands clean:  Always wash your hands before touching your incision (surgery wound). This helps reduce your risk of infection. If your hands aren't dirty, you may use an alcohol hand-rub to clean your hands. Keep your nails clean and short.    Call your healthcare provider if you have any of these symptoms:       You soak a sanitary pad with blood within 1 hour, or you see blood clots larger than a golf ball.    Bleeding that lasts more than 6 weeks.    Vaginal discharge that smells bad.    Severe pain, cramping or tenderness in your lower belly area.    A need to urinate more frequently (use the toilet more often), more urgently (use the toilet very quickly), or it burns when you urinate.    Nausea and vomiting.    Redness, swelling or pain around a vein in your leg.    Problems breastfeeding or a red or painful area on your breast.    Chest pain and cough or are gasping for air.    Problems with coping with sadness, anxiety or depression. If you have concerns about hurting yourself or the baby, call your provider immediately.      You have questions or concerns after you return home.                  Pending Results     Date and Time Order Name Status Description    10/19/2017 1447 Placenta path order and indications In process             Statement of Approval     Ordered          10/22/17 0937  I have reviewed and agree with all the recommendations and orders detailed in this document.  EFFECTIVE NOW     Approved and electronically signed by:  Dalila Hensley MD             Admission Information     Date & Time Provider Department Dept. Phone    10/19/2017 Dalila Hensley MD [facility] [phone]      Your Vitals Were     Blood Pressure Pulse Temperature Respirations Last Period Pulse Oximetry    114/64 75 98.2  F (36.8  C) (Oral) 16 01/19/2017 100%      MyChart Information      [email or web address] lets you send messages to your doctor, view your test results, renew your prescriptions, schedule appointments and more. To sign up, go to [email or web address] . Click on \"Log in\" on the left side of the screen, which will take you to the Welcome page. Then click on \"Sign up Now\" on the right side of the page.     You will be asked to enter the access code listed below, as well as some personal information. Please follow the directions to create your username and password.     Your access code is: [email or web address]  Expires: 2017  3:20 PM     Your access code will  in 90 days. If you need help or a new code, please call your [facility] clinic or [phone].        Care EveryWhere ID     This is your Care EveryWhere ID. This could be used by other organizations to access your [facility] medical records  [id number]        Equal Access to Services     CHEPE OBRIEN AH: Tanya Kelley, waawais grimm, bernabe kaalmanohemy caraballo, wesley matos . So [facility] [phone].    ATENCIÓN: Si cucala sam, tiene a andre disposición servicios gratuitos de asistencia lingüística. Llame al [phone].    We comply with applicable federal civil rights laws and [place] laws. We do not discriminate on the basis of race, color, national origin, age, disability, sex, sexual orientation, or gender identity.               Review of your medicines      START taking        Dose / Directions    ibuprofen 600 MG tablet   Commonly known as:  ADVIL/MOTRIN        Dose:  600 mg   Take 1 tablet (600 mg) by mouth every 6 hours as needed for other (cramping)   Quantity:  60 tablet   Refills:  0       oxyCODONE 5 MG IR tablet   Commonly known as:  ROXICODONE        Dose:  5-10 mg   Take 1-2 tablets (5-10 mg) by mouth every 4 hours as needed for moderate to severe pain   Quantity:  25 tablet   Refills:  0       senna-docusate 8.6-50 MG per tablet   Commonly known as:  SENOKOT-S;PERICOLACE        " Dose:  1-2 tablet   Take 1-2 tablets by mouth 2 times daily   Quantity:  60 tablet   Refills:  0         CONTINUE these medicines which have NOT CHANGED        Dose / Directions    acetaminophen 325 MG tablet   Commonly known as:  TYLENOL   Used for:  Healthcare maintenance        Dose:  325-650 mg   Take 1-2 tablets (325-650 mg) by mouth every 4 hours as needed for mild pain or fever Reported on 5/15/2017   Quantity:  100 tablet   Refills:  0       alum & mag hydroxide-simethicone 200-200-20 MG/5ML Susp suspension   Commonly known as:  MYLANTA/MAALOX   Used for:  Gastroesophageal reflux disease, esophagitis presence not specified        Dose:  30 mL   Take 30 mLs by mouth every 6 hours as needed for heartburn   Quantity:  355 mL   Refills:  0       Cholecalciferol 4000 UNITS Caps   Used for:  Vitamin D deficiency        Dose:  4000 Units   Take 4,000 Units by mouth daily   Quantity:  30 capsule   Refills:  3       PRENATAL 19 Chew   Used for:  Pregnancy examination or test, positive result        Dose:  1 tablet   Take 1 tablet by mouth daily   Quantity:  90 tablet   Refills:  3       ranitidine 300 MG tablet   Commonly known as:  ZANTAC   Used for:  Gastroesophageal reflux disease without esophagitis        Dose:  300 mg   Take 1 tablet (300 mg) by mouth At Bedtime   Quantity:  30 tablet   Refills:  1       SEA-BAND ADULT Belkys   Used for:  Nausea        Dose:  1 Device   1 Device as needed   Quantity:  1 each   Refills:  0       senna 8.6 MG tablet   Commonly known as:  SENOKOT   Used for:  Slow transit constipation        Dose:  1 tablet   Take 1 tablet by mouth daily   Quantity:  30 tablet   Refills:  3            Where to get your medicines      These medications were sent to [facility] [place], [place] - [address] [address]     Phone:  [phone]     ibuprofen 600 MG tablet    senna-docusate 8.6-50 MG per tablet         Some of these will need a  paper prescription and others can be bought over the counter. Ask your nurse if you have questions.     Bring a paper prescription for each of these medications     oxyCODONE 5 MG IR tablet                Protect others around you: Learn how to safely use, store and throw away your medicines at www.disposemymeds.org.             Medication List: This is a list of all your medications and when to take them. Check marks below indicate your daily home schedule. Keep this list as a reference.      Medications           Morning Afternoon Evening Bedtime As Needed    acetaminophen 325 MG tablet   Commonly known as:  TYLENOL   Take 1-2 tablets (325-650 mg) by mouth every 4 hours as needed for mild pain or fever Reported on 5/15/2017   Last time this was given:  975 mg on 10/22/2017  8:22 AM                                alum & mag hydroxide-simethicone 200-200-20 MG/5ML Susp suspension   Commonly known as:  MYLANTA/MAALOX   Take 30 mLs by mouth every 6 hours as needed for heartburn                                Cholecalciferol 4000 UNITS Caps   Take 4,000 Units by mouth daily                                ibuprofen 600 MG tablet   Commonly known as:  ADVIL/MOTRIN   Take 1 tablet (600 mg) by mouth every 6 hours as needed for other (cramping)   Last time this was given:  800 mg on 10/22/2017 11:54 AM                                oxyCODONE 5 MG IR tablet   Commonly known as:  ROXICODONE   Take 1-2 tablets (5-10 mg) by mouth every 4 hours as needed for moderate to severe pain   Last time this was given:  5 mg on 10/22/2017 11:54 AM                                PRENATAL 19 Chew   Take 1 tablet by mouth daily                                ranitidine 300 MG tablet   Commonly known as:  ZANTAC   Take 1 tablet (300 mg) by mouth At Bedtime                                SEA-BAND ADULT Belkys   1 Device as needed                                senna 8.6 MG tablet   Commonly known as:  SENOKOT   Take 1 tablet by mouth daily                                 senna-docusate 8.6-50 MG per tablet   Commonly known as:  SENOKOT-S;PERICOLACE   Take 1-2 tablets by mouth 2 times daily   Last time this was given:  1 tablet on 10/22/2017  8:22 AM

## 2017-10-19 NOTE — DISCHARGE SUMMARY
[facility] Discharge Summary    Marti Vale MRN# [id number]   Age: 37 year old YOB: 1980     Date of Admission:  10/19/2017  Date of Discharge:  10/22/2017  Admitting Physician:  Dalila Hensley MD  Discharge Physician:  Dalila Hensley MD     Admit Dx:   -  at 392w0d  - H/o CS x3  - AMA  - Suspected fetal macrosomia  - Possible latent TB    Discharge Dx:  - , s/p repeat low transverse  section  - Otherwise same as above    Procedures:  - Repeat low transverse  section with double layer uterine closure via Pfannenstiel incision  - Spinal analgesia, MAC    Admit HPI:  Ms. Marti Vale is a 37 year old  at 39w0d by LMP c/w 8+6wk US, who presents for RLTCS d/t h/o 3 prior CS.  Please see her admit H&P for full details of her PMH, PSH, Meds, Allergies and exam on admit.    Operative Course:  Surgery was uncomplicated. EBL from the delivery was 600. Please see her  Section Operative Note for full details regarding her delivery.    Operative Findings:   1. Minimal anterior abdominal wall adhesions, minimal intra-abdominal adhesions.  2. Thin lower uterine segment  3. Clear amniotic fluid  4. Liveborn female infant in ROT presentation. Apgars 1 at 1 minute & 4 at 5 minutes and 7 at 10 minutes. Weight 9lb 15oz.  5. Cord gases unable to obtain d/t clotting (sent late).  6. Normal uterus, and bilateral fallopian tubes, and ovaries.     Postoperative Course:  Her postoperative course was uncomplicated. On POD#3, she was meeting all of her postpartum goals and deemed stable for discharge. She was voiding without difficulty, tolerating a regular diet without nausea and vomiting, her pain was well controlled on oral pain medicines and her lochia was appropriate. Her hemoglobin prior to delivery was 12.8 and after delivery was 11.8. Her Rh status was positive and Rhogam was not indicated.    Discharge Medications:      Review of your medicines      START taking       Dose / Directions    ibuprofen 600 MG tablet   Commonly known as:  ADVIL/MOTRIN        Dose:  600 mg   Take 1 tablet (600 mg) by mouth every 6 hours as needed for other (cramping)   Quantity:  60 tablet   Refills:  0       oxyCODONE 5 MG IR tablet   Commonly known as:  ROXICODONE        Dose:  5-10 mg   Take 1-2 tablets (5-10 mg) by mouth every 4 hours as needed for moderate to severe pain   Quantity:  25 tablet   Refills:  0       senna-docusate 8.6-50 MG per tablet   Commonly known as:  SENOKOT-S;PERICOLACE        Dose:  1-2 tablet   Take 1-2 tablets by mouth 2 times daily   Quantity:  60 tablet   Refills:  0         CONTINUE these medicines which have NOT CHANGED       Dose / Directions    acetaminophen 325 MG tablet   Commonly known as:  TYLENOL   Used for:  Healthcare maintenance        Dose:  325-650 mg   Take 1-2 tablets (325-650 mg) by mouth every 4 hours as needed for mild pain or fever Reported on 5/15/2017   Quantity:  100 tablet   Refills:  0       alum & mag hydroxide-simethicone 200-200-20 MG/5ML Susp suspension   Commonly known as:  MYLANTA/MAALOX   Used for:  Gastroesophageal reflux disease, esophagitis presence not specified        Dose:  30 mL   Take 30 mLs by mouth every 6 hours as needed for heartburn   Quantity:  355 mL   Refills:  0       Cholecalciferol 4000 UNITS Caps   Used for:  Vitamin D deficiency        Dose:  4000 Units   Take 4,000 Units by mouth daily   Quantity:  30 capsule   Refills:  3       PRENATAL 19 Chew   Used for:  Pregnancy examination or test, positive result        Dose:  1 tablet   Take 1 tablet by mouth daily   Quantity:  90 tablet   Refills:  3       ranitidine 300 MG tablet   Commonly known as:  ZANTAC   Used for:  Gastroesophageal reflux disease without esophagitis        Dose:  300 mg   Take 1 tablet (300 mg) by mouth At Bedtime   Quantity:  30 tablet   Refills:  1       SEA-BAND ADULT Belkys   Used for:  Nausea         Dose:  1 Device   1 Device as needed   Quantity:  1 each   Refills:  0       senna 8.6 MG tablet   Commonly known as:  SENOKOT   Used for:  Slow transit constipation        Dose:  1 tablet   Take 1 tablet by mouth daily   Quantity:  30 tablet   Refills:  3            Where to get your medicines      These medications were sent to [facility] - [place], [place] - [address]  [address], [facility] [address]     Phone:  [phone]      ibuprofen 600 MG tablet     senna-docusate 8.6-50 MG per tablet         Some of these will need a paper prescription and others can be bought over the counter. Ask your nurse if you have questions.     Bring a paper prescription for each of these medications      oxyCODONE 5 MG IR tablet             Discharge/Disposition:  Marti Vale was discharged to home in stable condition with the following instructions/medications:  1) Call for temperature > 100.4, bright red vaginal bleeding >1 pad an hour x 2 hours, foul smelling vaginal discharge, pain not controlled by usual oral pain meds, persistent nausea and vomiting not controlled on medications, drainage or redness from incision site  2) She desired an IUD for contraception.  3) For feeding she decided to breastfeed.  4) She was instructed to follow-up with her primary OB in 6 weeks for a routine postpartum visit and IUD insertion. She was instructed to follow up with her primary care provider regarding her possible history of latent TB.  5) Discharge activity:  No heavy lifting >15 lbs or strenuous activity for 6 weeks, pelvic rest for 6 weeks, no driving or operating machinery while on narcotics.      Lanny Adames MD   OB/GYN PGY-3    I have seen, examined and counseled the patient on the day of discharge. Discussed post operative instructions and medications. I have reviewed and edited the note.   Dalila Hensley

## 2017-10-19 NOTE — PLAN OF CARE
Patient delivered infant at 1333, NICU called STAT at delivery for poor tone and no respiratory effort. Assisted resource RN with resuscitation prior to NICU arrival, see resource RN note. Patient stable at end of procedure.

## 2017-10-19 NOTE — ANESTHESIA PROCEDURE NOTES
Spinal/LP Procedure Note    Spinal Block  Staff:     Anesthesiologist:  ZOHREH HENRIQUEZ  Location: OB  Procedure Start/Stop Times:      10/19/2017 1:01 PM     10/19/2017 1:08 PM    patient identified, IV checked, site marked, risks and benefits discussed, informed consent, monitors and equipment checked, pre-op evaluation, at physician/surgeon's request and post-op pain management      Correct Patient: Yes      Correct Position: Yes      Correct Site: Yes      Correct Procedure: Yes      Correct Laterality:  Yes    Site Marked:  Yes  Procedure:     Procedure:  Intrathecal    ASA:  1    Position:  Sitting    Sterile Prep: chloraprep      Insertion site:  L4-5    Approach:  Midline    Needle Type:  Malaika    Needle gauge (G):  25    Local Skin Infiltration:  1% lidocaine    Needle Length (in):  3.5    Introducer used: Yes      Introducer gauge:  20 G    Attempts:  1    Redirects:  1    CSF:  Clear    Paresthesias:  No

## 2017-10-19 NOTE — PROGRESS NOTES
[facility]   Post-partum Note    Name:  Marti Vale  MRN: [id number]    S: Patient feeling well, no concerns.  Pain well controlled with meds.  Tolerating regular diet without nausea or vomiting though not passing gas and and feels very distended.  Ambulating without dizziness.  Lochia wnl.  Working on pumping/breast feeding for baby in NICU.  Plans discharge tmr likely.    O:   Patient Vitals for the past 24 hrs:   BP Temp Temp src Pulse Heart Rate Resp SpO2   10/19/17 1815 112/73 97.6  F (36.4  C) Oral 64 - 20 100 %   10/19/17 1715 112/57 97.9  F (36.6  C) Oral 75 - 18 100 %   10/19/17 1645 121/61 - - - - 18 99 %   10/19/17 1620 (!) 107/91 - - - 79 16 100 %   10/19/17 1605 102/72 - - - 76 18 100 %   10/19/17 1550 107/58 - - - 68 17 100 %   10/19/17 1535 117/67 - - - 83 26 100 %   10/19/17 1520 104/64 - - - 72 16 100 %   10/19/17 1506 122/75 - - - 77 23 100 %   10/19/17 1452 108/64 - - - 89 28 100 %   10/19/17 1431 106/57 97.8  F (36.6  C) Oral - - - 100 %   10/19/17 0925 104/68 98.2  F (36.8  C) Oral - - 16 -     Gen:  Resting comfortably, NAD  CV:  RRR, soft systolic murmur  Pulm:  CTAB, no wheezes  Abd:  Soft, appropriately ttp, very distended. Fundus at 1-2 below umbilicus, firm and non-tender.  Incision: Bandage removed, steristrips in place, incision intact with minimal wet serosanguinous oozing.  Ext:  non-tender, 1+ LE edema b/l    I/O last 3 completed shifts:  In: 1100 [I.V.:1100]  Out: -     Hgb:   Hemoglobin   Date Value Ref Range Status   10/17/2017 12.8 11.7 - 15.7 g/dL Final       Assessment/Plan:  37 year old  on PPD #1 s/p RLTCS.    - continue with routine postpartum management  Pain: Well-controlled with toradol and tylenol and oxycodone  GI: f/u abd distention, encourage ambulation  : F/u void, lyle out this AM  Hgb: 12.8>> 11.8  Rh: positive  Rubella: immune  Feed: Breast  BC: IUD  Dispo: DC PPD#2-3    Questionable history of latent TB: CXR  10/19/15 w/out nodules. Found in problem list but haven't found notes regarding treatment. Patient states she has no problems and has never been treated.  - Outpatient follow up    Barbie Marquez MD  OB Gyn PGY2  10/20/17 6:55 AM     I personally examined and evaluated Marti Vale on 10/20/2017 independently from the resident.  I discussed the patient with Dr. Marquez and agree with the assessment and plan of care as documented in the above note with edits by me. Additional comments: POD#1 s/p scheduled repeat  (patient's 4th), doing well overall this morning. Eating breakfast at the time of exam; denies nausea, but has not yet passed flatus. Has ambulated once in room. Plan to increase ambulation today and monitor for return of bowel function. Infant in NICU due to respiratory distress; extubated to CPAP this morning.     Peggy Mccann MD  10:40 AM

## 2017-10-19 NOTE — PLAN OF CARE
Data: Marti Vale transferred to postpartum unit room 7135 via cart at 1650. Out of L&D Recovery at 1630 to see infant in NICU.   Action: Receiving unit notified of transfer: Yes. Patient and family notified of room change. Report given to JOSÉ LUIS Winslow at 1615. Belongings sent to receiving unit. Accompanied by Registered Nurse. Oriented patient to surroundings. Call light within reach. Response: Patient tolerated transfer and is stable.

## 2017-10-19 NOTE — PROVIDER NOTIFICATION
10/19/17 0901   Provider Notification   Provider Name/Title Dr Marquez    Method of Notification In Department   Notification Reason Patient Arrived   Patient arrived for scheduled c/s at 10:30am

## 2017-10-19 NOTE — OP NOTE
[facility]  Full Operative Progress Note     Surgery Date:  10/19/2017  Surgeon:  Dalila Hensley MD  Assistants:  Barbie Marquez MD PGY-2    Mignon Bryant MS3  Pre-op Diagnosis:    1. Intrauterine pregnancy at 39w0d  2. H/o 3 prior CS  3. Anterior placenta   4. Suspected macrosomia  Post-op Diagnosis:    1. Same   2. Liveborn female infant   3. macrosomia  Procedure:  Repeat low-transverse  section with double layer uterine closure via Pfannensteil incision    Anesthesia: Spinal with MAC  EBL:  600 mL  IVF:  1100 mL crystalloid  UOP:  150 mL clear yellow urine at the end of the case  Drains: Sellers Catheter   Specimens:  Placenta, cord blood, cord segment  Complications: None     Indications:   Marti Vale is a 37 year old  at 39w0d admitted for RLTCS d/t h/o of 3 prior CS.  The risks, benefits, and alternatives of  section were discussed with the patient, and she agreed to proceed.     Findings:   1. Minimal anterior abdominal wall adhesions, minimal intra-abdominal adhesions.  2. Thin lower uterine segment  3. Clear amniotic fluid  4. Liveborn female infant in ROT presentation. Apgars 2 at 1 minute & subsequent Apgars per NICU, please see their corresponding documentation. Weight pending.  5. Cord gases pending.  6. Normal uterus, and bilateral fallopian tubes, and ovaries.     Procedure Details:   The patient was brought to the OR, where adequate spinal anesthesia was administered. Following the spinal the patient was anxious and the decision was made to add MAC intra-op due to patient discomfort. She was placed in the dorsal supine position with a slight leftward tilt. She was prepped and draped in the usual sterile fashion. A surgical time out was performed. A pfannenstiel skin incision was made with the scalpel, and carried down to the underlying fascia with sharp and blunt dissection. The fascia was incised in the midline, and the incision was  extended laterally with cautery after elevation via [place]. The superior aspect of the fascia was grasped with the Kocher clamps and dissected off of the underlying rectus muscles with blunt and sharp dissection. The rectus muscles were already  in the midline, and entry into the peritoneum occurred with dissection of the fascia off the rectus superiorly, and the opening was extended with digital pressure. The bladder blade was placed. A transverse hysterotomy was made with the scalpel in the lower uterine segment, and the incision was extended with digital pressure. The infant was noted to be in the ROT position, and was delivered atraumatically.  Single nuchal cord was noted and reduced after delivery of the head. The shoulders delivered with moderate difficulty due to size. The  exhibited poor tone and NICU was called for. After 20 second delay with active  stimulation and drying, the  had improved in color but not tone, the cord was doubly clamped and cut, and the infant was handed off to the awaiting nursery staff. A segment of cord was cut and sent for gases. The placenta was delivered with gentle traction on the umbilical cord and uterine massage. The uterus was exteriorized and cleared of all clots and debris. Uterine tone was noted to be firm with 20 units of pitocin given through the running IV and uterine massage.  The hysterotomy was closed with a running locked suture of 0 Vicryl.  The hysterotomy was then imbricated using an 0 Monocryl suture. The hysterotomy was noted to be hemostatic. The posterior cul-de-sac was cleared of all clots and debris. The uterus was returned to the abdomen. The pericolic gutters were cleared of all clots and debris. The hysterotomy was reexamined and noted to be hemostatic. The fascia and rectus muscles were examined and areas of oozing were controlled with electrocautery. The fascia was closed with a running 0 Vicryl suture. The subcutaneous  tissue was irrigated and areas of oozing were controlled with electrocautery. The subcutaneous tissue was greater than 2 cm in thickness, and was therefore was closed with 2-0 plain. The skin was closed with 4-0 monocryl and covered with steristrips, and a sterile dressing.    All sponge, needle, and instrument counts were correct. The patient tolerated the procedure well, and was transferred to recovery in stable condition. Dr. Hensley was present and scrubbed for the entirety of the procedure.     Barbie Marquez MD  OB GYN PGY-2  10/19/2017, 2:10 PM     I was present and scrubbed for entire procedure.   Dalila Hensley

## 2017-10-19 NOTE — ANESTHESIA CARE TRANSFER NOTE
Patient: Marti Vale    Procedure(s):  Repeat  Section  - Wound Class: II-Clean Contaminated    Diagnosis: Previous  Section   Diagnosis Additional Information: No value filed.    Anesthesia Type:   Spinal     Note:  Airway :Room Air  Patient transferred to:PACU  Comments: Arrived in PACU, report to RN, vitals stable, patient comfortable.  Handoff Report: Identifed the Patient, Identified the Reponsible Provider, Reviewed the pertinent medical history, Discussed the surgical course, Reviewed Intra-OP anesthesia mangement and issues during anesthesia, Set expectations for post-procedure period and Allowed opportunity for questions and acknowledgement of understanding      Vitals: (Last set prior to Anesthesia Care Transfer)    CRNA VITALS  10/19/2017 1357 - 10/19/2017 1433      10/19/2017             NIBP: 99/55    NIBP Mean: 69                Electronically Signed By: ДМИТРИЙ Kuhn CRNA  2017  2:33 PM

## 2017-10-19 NOTE — IP AVS SNAPSHOT
[facility]    [address] [address]    Phone:  [phone]                                       After Visit Summary   10/19/2017    Marti Vale    MRN: [id number]           After Visit Summary Signature Page     I have received my discharge instructions, and my questions have been answered. I have discussed any challenges I see with this plan with the nurse or doctor.    ..........................................................................................................................................  Patient/Patient Representative Signature      ..........................................................................................................................................  Patient Representative Print Name and Relationship to Patient    ..................................................               ................................................  Date                                            Time    ..........................................................................................................................................  Reviewed by Signature/Title    ...................................................              ..............................................  Date                                                            Time

## 2017-10-19 NOTE — ANESTHESIA PREPROCEDURE EVALUATION
Anesthesia Evaluation     . Pt has had prior anesthetic. Type: Regional and General    History of anesthetic complications (High spinal with previous 2 c-sections resulting in GA)          ROS/MED HX    ENT/Pulmonary:     (+)BAN risk factors obese, , . .    Neurologic:       Cardiovascular:         METS/Exercise Tolerance:     Hematologic:         Musculoskeletal:         GI/Hepatic:     (+) GERD       Renal/Genitourinary:         Endo:     (+) Obesity, .      Psychiatric:         Infectious Disease:   (+) TB (latent TB),       Malignancy:         Other:                                    Anesthesia Plan      History & Physical Review  History and physical reviewed and following examination; no interval change.    ASA Status:  2 .    NPO Status:  > 8 hours    Plan for Spinal          Postoperative Care  Postoperative pain management:  Multi-modal analgesia (Offered TAP Blocks to patient, but patient refused).      Consents

## 2017-10-19 NOTE — PLAN OF CARE
Patient arrived to [facility] unit via cart at 1645 ,with belongings, accompanied by spouse and RN. Infant to remain in NICU. Got report from [facility] and room orientation completed. No concerns a this time. Continue with plan of care.

## 2017-10-19 NOTE — PLAN OF CARE
Problem: Breastfeeding (Infant)  Goal: Effective Breastfeeding  Patient will demonstrate the desired outcomes by discharge/transition of care.  Outcome: Declining  Patient education on need for early manual expression and breast pumping initiation for establishment of milk supply.  Patient states, she understands, but does not want to pump or manually express now. RN offered to help her with expression and pumping. Patient declined.  Will let RN know when she is ready.

## 2017-10-20 ENCOUNTER — TELEPHONE (OUTPATIENT)
Dept: FAMILY MEDICINE | Facility: CLINIC | Age: 37
End: 2017-10-20

## 2017-10-20 LAB — HGB BLD-MCNC: 11.8 G/DL (ref 11.7–15.7)

## 2017-10-20 PROCEDURE — 12000030 ZZH R&B OB INTERMEDIATE UMMC

## 2017-10-20 PROCEDURE — 36416 COLLJ CAPILLARY BLOOD SPEC: CPT | Performed by: STUDENT IN AN ORGANIZED HEALTH CARE EDUCATION/TRAINING PROGRAM

## 2017-10-20 PROCEDURE — 25000128 H RX IP 250 OP 636: Performed by: STUDENT IN AN ORGANIZED HEALTH CARE EDUCATION/TRAINING PROGRAM

## 2017-10-20 PROCEDURE — 85018 HEMOGLOBIN: CPT | Performed by: STUDENT IN AN ORGANIZED HEALTH CARE EDUCATION/TRAINING PROGRAM

## 2017-10-20 PROCEDURE — 25000132 ZZH RX MED GY IP 250 OP 250 PS 637: Performed by: STUDENT IN AN ORGANIZED HEALTH CARE EDUCATION/TRAINING PROGRAM

## 2017-10-20 RX ORDER — AMOXICILLIN 250 MG
1-2 CAPSULE ORAL 2 TIMES DAILY
Qty: 60 TABLET | Refills: 0 | Status: SHIPPED | OUTPATIENT
Start: 2017-10-20 | End: 2017-12-18

## 2017-10-20 RX ORDER — BISACODYL 10 MG
10 SUPPOSITORY, RECTAL RECTAL DAILY PRN
Status: DISCONTINUED | OUTPATIENT
Start: 2017-10-20 | End: 2017-10-22 | Stop reason: HOSPADM

## 2017-10-20 RX ORDER — OXYCODONE HYDROCHLORIDE 5 MG/1
5-10 TABLET ORAL EVERY 4 HOURS PRN
Qty: 25 TABLET | Refills: 0 | Status: SHIPPED | OUTPATIENT
Start: 2017-10-20 | End: 2017-11-02

## 2017-10-20 RX ORDER — IBUPROFEN 600 MG/1
600 TABLET, FILM COATED ORAL EVERY 6 HOURS PRN
Qty: 60 TABLET | Refills: 0 | Status: SHIPPED | OUTPATIENT
Start: 2017-10-20 | End: 2017-12-18

## 2017-10-20 RX ORDER — POLYETHYLENE GLYCOL 3350 17 G/17G
17 POWDER, FOR SOLUTION ORAL DAILY PRN
Status: DISCONTINUED | OUTPATIENT
Start: 2017-10-20 | End: 2017-10-22 | Stop reason: HOSPADM

## 2017-10-20 RX ADMIN — ACETAMINOPHEN 975 MG: 325 TABLET, FILM COATED ORAL at 23:38

## 2017-10-20 RX ADMIN — ACETAMINOPHEN 975 MG: 325 TABLET, FILM COATED ORAL at 07:39

## 2017-10-20 RX ADMIN — SENNOSIDES AND DOCUSATE SODIUM 2 TABLET: 8.6; 5 TABLET ORAL at 21:44

## 2017-10-20 RX ADMIN — KETOROLAC TROMETHAMINE 30 MG: 30 INJECTION, SOLUTION INTRAMUSCULAR at 09:00

## 2017-10-20 RX ADMIN — SIMETHICONE CHEW TAB 80 MG 80 MG: 80 TABLET ORAL at 05:45

## 2017-10-20 RX ADMIN — SENNOSIDES AND DOCUSATE SODIUM 2 TABLET: 8.6; 5 TABLET ORAL at 09:00

## 2017-10-20 RX ADMIN — IBUPROFEN 800 MG: 400 TABLET ORAL at 21:47

## 2017-10-20 RX ADMIN — POLYETHYLENE GLYCOL 3350 17 G: 17 POWDER, FOR SOLUTION ORAL at 09:23

## 2017-10-20 RX ADMIN — BISACODYL 10 MG: 10 SUPPOSITORY RECTAL at 17:22

## 2017-10-20 RX ADMIN — OXYCODONE HYDROCHLORIDE 5 MG: 5 TABLET ORAL at 05:45

## 2017-10-20 RX ADMIN — SIMETHICONE CHEW TAB 80 MG 80 MG: 80 TABLET ORAL at 15:39

## 2017-10-20 RX ADMIN — IBUPROFEN 800 MG: 400 TABLET ORAL at 15:40

## 2017-10-20 RX ADMIN — ACETAMINOPHEN 975 MG: 325 TABLET, FILM COATED ORAL at 15:39

## 2017-10-20 RX ADMIN — OXYCODONE HYDROCHLORIDE 5 MG: 5 TABLET ORAL at 09:00

## 2017-10-20 RX ADMIN — KETOROLAC TROMETHAMINE 30 MG: 30 INJECTION, SOLUTION INTRAMUSCULAR at 03:04

## 2017-10-20 NOTE — PLAN OF CARE
Problem: Patient Care Overview  Goal: Plan of Care/Patient Progress Review  Outcome: No Change  [id number]  Patient had no issues thus far. Post op vitals and fundus check wdl. Tolerating clears, lyle patent with good output. Patient declined to breast pump currently, and requested to pump later evening when she gets rested. Family at bedside. Will continue with current plan of care.

## 2017-10-20 NOTE — PROVIDER NOTIFICATION
10/19/17 7539   Provider Notification   Provider Name/Title G2   Method of Notification Electronic Page   Request Evaluate-Remote   Notification Reason Medication Request;Other  (Sleep med requested; Q about TB CXR)

## 2017-10-20 NOTE — PLAN OF CARE
Problem: Postpartum ( Delivery) (Adult,Obstetrics,Pediatric)  Goal: Signs and Symptoms of Listed Potential Problems Will be Absent, Minimized or Managed (Postpartum)  Signs and symptoms of listed potential problems will be absent, minimized or managed by discharge/transition of care (reference Postpartum ( Delivery) (Adult,Obstetrics,Pediatric) CPG).   Outcome: No Change  VSS and assessments WDL. Pain well managed with oral meds. Up ad diana, voiding freely. Tolerating regular diet. Incision healing well, no signs of infection. Inter-dry replaced. Pt complained of gas discomfort during beginning of shift, miralax and 2 stool softeners given. Pt eventually given suppository, verbalized comfort after passing BM and flatus. Pumping with encouragement, visits infant in NICU to breastfeed. Continue with current POC.

## 2017-10-20 NOTE — ANESTHESIA POSTPROCEDURE EVALUATION
Patient: Marti Vale    Procedure(s):  Repeat  Section  - Wound Class: II-Clean Contaminated    Diagnosis:Previous  Section   Diagnosis Additional Information: No value filed.    Anesthesia Type:  Spinal    Note:  Anesthesia Post Evaluation    Patient location during evaluation: OB PACU  Patient participation: Able to fully participate in evaluation  Level of consciousness: awake  Pain management: adequate  Airway patency: patent  Cardiovascular status: acceptable  Respiratory status: acceptable  Hydration status: acceptable  PONV: none     Anesthetic complications: None          Last vitals:  Vitals:    10/19/17 1815 10/19/17 1900 10/19/17 1958   BP: 112/73 116/65 119/71   Pulse: 64 72 68   Resp: 20 20 18   Temp: 36.4  C (97.6  F) 36.7  C (98.1  F) 36.8  C (98.2  F)   SpO2: 100% 100% 100%         Electronically Signed By: Twila Chandra MD  2017  8:28 PM

## 2017-10-20 NOTE — PLAN OF CARE
Problem: Patient Care Overview  Goal: Plan of Care/Patient Progress Review  Outcome: Improving  VSS. Mom has declined pumping throughout evening and overnight; did agree to pump twice overnight after encouragement from staff. Mom is not getting drops of colostrum while pumping yet. Catheter was removed at 0230 per pt request. I&O WDL. Awaiting mom's first void since catheter removal. Mom has been up and ambulating to NICU with a wheelchair for support. Pain is being controlled with tylenol, toradol, oxycodone and simethicone. Mom did request and receive Atarax to help with sleep this evening. Older daughter at bedside for support. Continue with plan of care.

## 2017-10-20 NOTE — LACTATION NOTE
D:  I met with Marti villanueva today, initially with [place] .  She has 5 other children she  for more or less one year.  Marti is normally in good health, takes no medications, and has no history of breast/chest surgery or trauma. She has already started to pump.    I:  I gave her a folder of introductory materials and went over pumping guidelines.  We discussed using hands on pumping techniques and hand expression.  I told her we could help her obtain a pump to use at discharge, and said she should continue pumping until her baby is able to breastfeed 20 minutes strongly every 3 hours (she was very anxious to start breastfeeding instead).  Later, I observed her with her baby at breast.  She positioned her in a traditional hold, baby latched and was sucking well.  I saw visible swallows.  I reminded Marti to pump afterward as well.  A:  Mom happy she is able to start breastfeeding her baby.  P:  Will continue to provide lactation support.       Cathy Mark, RNC, IBCLC

## 2017-10-21 LAB
BLD PROD TYP BPU: NORMAL
BLD PROD TYP BPU: NORMAL
BLD UNIT ID BPU: 0
BLD UNIT ID BPU: 0
BLOOD PRODUCT CODE: NORMAL
BLOOD PRODUCT CODE: NORMAL
BPU ID: NORMAL
BPU ID: NORMAL
TRANSFUSION STATUS PATIENT QL: NORMAL

## 2017-10-21 PROCEDURE — 12000028 ZZH R&B OB UMMC

## 2017-10-21 PROCEDURE — 25000132 ZZH RX MED GY IP 250 OP 250 PS 637: Performed by: STUDENT IN AN ORGANIZED HEALTH CARE EDUCATION/TRAINING PROGRAM

## 2017-10-21 RX ADMIN — OXYCODONE HYDROCHLORIDE 5 MG: 5 TABLET ORAL at 19:02

## 2017-10-21 RX ADMIN — ACETAMINOPHEN 975 MG: 325 TABLET, FILM COATED ORAL at 17:52

## 2017-10-21 RX ADMIN — SIMETHICONE CHEW TAB 80 MG 80 MG: 80 TABLET ORAL at 08:47

## 2017-10-21 RX ADMIN — SENNOSIDES AND DOCUSATE SODIUM 1 TABLET: 8.6; 5 TABLET ORAL at 19:03

## 2017-10-21 RX ADMIN — ACETAMINOPHEN 975 MG: 325 TABLET, FILM COATED ORAL at 23:30

## 2017-10-21 RX ADMIN — IBUPROFEN 800 MG: 400 TABLET ORAL at 02:59

## 2017-10-21 RX ADMIN — SIMETHICONE CHEW TAB 80 MG 80 MG: 80 TABLET ORAL at 21:07

## 2017-10-21 RX ADMIN — IBUPROFEN 800 MG: 400 TABLET ORAL at 17:52

## 2017-10-21 RX ADMIN — SENNOSIDES AND DOCUSATE SODIUM 1 TABLET: 8.6; 5 TABLET ORAL at 08:47

## 2017-10-21 RX ADMIN — IBUPROFEN 800 MG: 400 TABLET ORAL at 11:32

## 2017-10-21 RX ADMIN — IBUPROFEN 800 MG: 400 TABLET ORAL at 23:30

## 2017-10-21 RX ADMIN — ACETAMINOPHEN 975 MG: 325 TABLET, FILM COATED ORAL at 08:47

## 2017-10-21 NOTE — PROGRESS NOTES
Post Partum Progress Note  POD#2    Subjective:  She is resting comfortably in bed this morning. Pain is assisted with medications, doesn't like oxycodone as makes her tired, so going to try just ibuprofen and tylenol.  Minimal lochia.  Tolerating regular diet.  Urinating without issues.  + flatus and BM.  Ambulating without dizziness.  Continuing to work on feeding with baby in NICU    Objective:   Vitals:    10/20/17 0924 10/20/17 1830 10/20/17 2159 10/21/17 0800   BP: 110/72  120/70 110/72   Pulse:       Resp: 16 16 16 16   Temp: 98.5  F (36.9  C) 98.1  F (36.7  C) 98  F (36.7  C) 98  F (36.7  C)   TempSrc: Oral Oral Oral Oral   SpO2: 100%        General: NAD. A&Ox3.  CV: Regular rate, well perfused.   Pulm: Normal respiratory effort.  Abd: Soft, non-tender, minimal distension. Fundus is firm and 1 cm below the umbilicus.    Incision: pfannenstiel skin incision is clean, dry, intact  Ext: trace lower extremity edema bilaterally. No calf tenderness.    Assessment/Plan:  Marti Vale is a 37 year old  female who is POD#2 s/p RLTCS, doing well postoperatively.    - Encourage routine post-operative goals including ambulation and incentive spirometry  - PNC: Rh positive. Rubella immune. No intervention indicated.  - Pain: controlled on oral medications  - Heme: Hgb 12.8>>11.8. Vital signs stable.  - GI: continue anti-emetics as needed and stool softeners scheduled.  - : Voiding spontaneously.  - Infant: Stable in NICU, weaned from endotracheal tube to CPAP yesterday, working on feeding  - Feeding: Plans on breast feeding.  - BC: Plans on IUD at 6 week PP visit    Discharge to to home POD#3    Mita Wadsworth MD

## 2017-10-21 NOTE — PLAN OF CARE
Problem: Postpartum ( Delivery) (Adult,Obstetrics,Pediatric)  Goal: Signs and Symptoms of Listed Potential Problems Will be Absent, Minimized or Managed (Postpartum)  Signs and symptoms of listed potential problems will be absent, minimized or managed by discharge/transition of care (reference Postpartum ( Delivery) (Adult,Obstetrics,Pediatric) CPG).   Outcome: Improving  VSS, postpartum checks WDL, interdry applied to incision overnight - moderate dried blood on steri strips. Showered independently. Voiding independently. Pain managed on tylenol and ibuprofen. Declining to pump overnight, instead goes down to NICU to breastfeed . Will continue to monitor and provide support.

## 2017-10-22 VITALS
DIASTOLIC BLOOD PRESSURE: 64 MMHG | RESPIRATION RATE: 16 BRPM | HEART RATE: 75 BPM | OXYGEN SATURATION: 100 % | TEMPERATURE: 98.2 F | SYSTOLIC BLOOD PRESSURE: 114 MMHG

## 2017-10-22 PROCEDURE — 25000132 ZZH RX MED GY IP 250 OP 250 PS 637: Performed by: STUDENT IN AN ORGANIZED HEALTH CARE EDUCATION/TRAINING PROGRAM

## 2017-10-22 RX ADMIN — OXYCODONE HYDROCHLORIDE 5 MG: 5 TABLET ORAL at 06:10

## 2017-10-22 RX ADMIN — OXYCODONE HYDROCHLORIDE 5 MG: 5 TABLET ORAL at 11:54

## 2017-10-22 RX ADMIN — IBUPROFEN 800 MG: 400 TABLET ORAL at 11:54

## 2017-10-22 RX ADMIN — ACETAMINOPHEN 975 MG: 325 TABLET, FILM COATED ORAL at 08:22

## 2017-10-22 RX ADMIN — SENNOSIDES AND DOCUSATE SODIUM 1 TABLET: 8.6; 5 TABLET ORAL at 08:22

## 2017-10-22 RX ADMIN — IBUPROFEN 800 MG: 400 TABLET ORAL at 06:10

## 2017-10-22 NOTE — PLAN OF CARE
Referral made to [facility] for early discharge, note made that patient uses [facility]

## 2017-10-22 NOTE — DISCHARGE INSTRUCTIONS
Birth Discharge Instructions: [place]  Waxqabadka    Garvey qaadin wax kabadan 10 roodal 6 asbuuc kadib qalliinka. Waydii qoyska iyo saxibadaa caawin markaad u baFormerly [facility].     Garvey wadin gaadhi markaad qaadanayso kaniiniyada xanunka ee uu dhakhtarkaagu kuu qoro. Waxaad wadi kartaa gadhi haddii aad qaadanayso kaniiniyada xanuunka ee koontarka.     Lama ogala jimicsi adag ama howl culus 6 asbuuc. Garvey samayn wax dhib ku keeni radha meesha qalliinka lagu sameeeyay.    Garvey sobeida jiidin adoo aad u doconaya markaad isticmaalayso musqusha. Kooxdaada daryeelka ayaa waxay ku qori doonaan wax saxarada jilciya haddii ay dhibaato kaa haysato saxarada oo adag (caloosha oo fadhida) .    Ka taxadar meesha la qalay:    Meesha la qalay nadiif garvey ahaato hana qallalnaato    Garvey ku buuxin meesha la qalay biyo. Dabaal ama tuubooyinka biyaha kulul lama ogala ilaa uu qalliinku roger ahaanba bogsado. Waxaad isku furi kartaa tuubada qabayska haddii heerka biyuhu ay ka hoooseeyaan meesha qalliinka.    Dhaqidda kadib, meesha qalliinka si fican u qalaji. Sidoo kale ku aditi qalajinta maqarka u dhow meesha qaliinka ee ay istaaban karaan.     Ha isticmaalin wax subkid, jeel, wesly, looshin ama wax kale oo aad mariso meesha qalliinka.    Dharkaaga u xiri qaab wanaagsan si aysan cadaadis ugu samayn meesha la qalay  (tusaale ahaan fiiri say u sobeida jiidayso kastuumadaadu)     Haddii aad leedahay Qodabada Latolay, faraha ka qaad tolmada. Iyagaa iskood isaga dhacaya ama waxaad siibi kartaa asaf asbuuc kadib.    Waxaad arki kartaa cadad daniele oo catrina cad ah ama binki ah waana iska caadi. U taga bixiyahaaga daryeka caafimaad:    Haddii raadka tolmuhu uu waynaado ama uu ur keeno.    Haddii aad ku yeelato meesha la tolay barar, guduudasho, ama cun cun.    Haddii aad yeelato xanuun kordhaya oo xanuunkaagana aysan daawadu wax ka tarayn.     Haddii aad qabto qandho  100.4  F (38  C) am aka saraysa (heerkulka laga qaaday carabkaaga hoostiisa), oo qarqaryo leh ama  aan lahayn     Meesha u dhow meesha la qalay ([facility] qalliinka) waxaad ka dareemi doontaa inaysan dareen lahayn. Edy waa caadi. Dareen la aantu waxay u dhamaan doontaa wax kayar sanad.     Gacmahaagga nadiifi:  Markasta dhaqa gacahaaga ka hor inta aadan taaban farjiga agagaarkiisa  iyo meesha la tolay. Edy waxay caawiniaysaa inuu yaraado infakshanku. Haddii gacmahaagu aysan wasakh lahayn, waxaad isticmaali kartaa aalkalo aad gacmaha ku tirtirto si aad u nadiifiso gacmahaaga. Cidiyahaaga nadiifi ooo jar.    Wac bixiyahaaga daryeelka caafimaaad haddii aad qabtid mid ka  mid ah [facility] cricket socda:    Haddii suufka dhiigga nuuga uu ku buuxsamo 1 saac gudihiis, ama aad aragto xinjiro dhiig ah oo ka wayn kubadda golafka.    Dhiig soconaya wax kabadan 6 asbuuc.    Haddii aad qabto dheecaan farjiga ka imaanaya oo  si xun u uraya.    Rosita, nabar, majiirid daran oo aad ka dareeto qaybta hoose ee ubucda.    Kaadi badan oo dagdag ah oo markasta ku qabanaysa, ama gubasho aad dareento markaad kaajayso.    Lalabbo ama matag.    Guduudasho, barar, ama xanuun aad ka dareento xididada lugta.    Dhibaato kaa haysata naas nuujinta, ama guduudasho ama xanuun naaska ah.    Xabad xanuun iyo qufac ama naqaska oo kugu dhagaya.    Dhibaato ay la socoto murugo, walaac, aa walwal.   Haddii aad radha altamirano [facility] eugenia cevallos.     Haddii aad qabto thai alexandre.       Birth Discharge Instructions  Activity    Do not lift more than 10 pounds for 6 weeks after surgery. Ask family and friends for help when you need it.    Do not drive while taking pain pills prescribed by your doctor. You may drive if taking over-the-counter pain pills.    No heavy exercise or activity for 6 weeks. Don t do anything that will put a strain on your surgery site.    Don t strain when using the toilet. Your care team may prescribe a stool  softener if you have problems with your bowel movements (constipation).    To care for your incision:    Keep the incision clean and dry    Do not soak your incision in water. No swimming or hot tubs until your incision has fully healed. You may soak in the bathtub if the water level is below your incision.    After washing, dry your incision well. Include the skin that may come in contact with your incision.    Do not use any peroxide, gel, cream, lotion, or ointment on your incision.     Adjust your clothes to avoid pressure on your surgery site (check the elastic in your underwear for example)    If you have Steri-Strips, leave the small strips of tape in place. They will fall off on their own, or you can remove them after one week.    You may see a small amount of clear or pink drainage and this is normal. Check with your health care provider:    If the drainage increases or has an odor.    If you have swelling, redness, or a rash around the incision.    If you have increased pain and your pain medicine doesn t help    If you have a fever of 100.4  F (38  C) or higher (temperature taken under your tongue), with or without chills   The area around your incision (surgery wound) will feel numb. This is normal. The numbness should go away in less than a year.   Keep your hands clean:   Always wash your hands before touching your incision. This helps reduce your risk of infection. If your hands aren t dirty, you may use an alcohol hand-rub to clean your hands. Keep your nails clean and short.     Call your health care provider if you have any of these symptoms:    You soak a sanitary pad with blood within 1 hour, or you see blood clots larger than a golf ball.    Bleeding that lasts more than 6 weeks.    You have vaginal discharge that smells bad.    Severe pain, cramping or tenderness in your lower belly area.    A more frequent or urgent need to urinate (pee), or it burns when you pee.    Nausea and  vomiting.    Redness, swelling or pain around a vein in your leg.    Problems breastfeeding, or a red or painful area on your breast.    If you have chest pain and cough or are gasping for air.    Problems coping with sadness, anxiety, or depression.     If you have any concerns about hurting yourself of the baby, call your doctor right away.    You have questions or concerns after you return home.Postop  Birth Instructions    Activity       Do not lift more than 10 pounds for 6 weeks after surgery.  Ask family and friends for help when you need it.    No driving until you have stopped taking your pain medications (usually two weeks after surgery).    No heavy exercise or activity for 6 weeks.  Don't do anything that will put a strain on your surgery site.    Don't strain when using the toilet.  Your care team may prescribe a stool softener if you have problems with your bowel movements.     To care for your incision:       Keep the incision clean and dry.    Do not soak your incision in water. No swimming or hot tubs until it has fully healed. You may soak in the bathtub if the water level is below your incision.    Do not use peroxide, gel, cream, lotion, or ointment on your incision.    Adjust your clothes to avoid pressure on your surgery site (check the elastic in your underwear for example).     You may see a small amount of clear or pink drainage and this is normal.  Check with your health care provider:       If the drainage increases or has an odor.    If the incision reddens, you have swelling, or develop a rash.    If you have increased pain and the medicine we prescribed doesn't help.    If you have a fever above 100.4 F (38 C) with or without chills when placing thermometer under your tongue.   The area around your incision (surgery wound), will feel numb.  This is normal. The numbness should go away in less than a year.     Keep your hands clean:  Always wash your hands before touching your  incision (surgery wound). This helps reduce your risk of infection. If your hands aren't dirty, you may use an alcohol hand-rub to clean your hands. Keep your nails clean and short.    Call your healthcare provider if you have any of these symptoms:       You soak a sanitary pad with blood within 1 hour, or you see blood clots larger than a golf ball.    Bleeding that lasts more than 6 weeks.    Vaginal discharge that smells bad.    Severe pain, cramping or tenderness in your lower belly area.    A need to urinate more frequently (use the toilet more often), more urgently (use the toilet very quickly), or it burns when you urinate.    Nausea and vomiting.    Redness, swelling or pain around a vein in your leg.    Problems breastfeeding or a red or painful area on your breast.    Chest pain and cough or are gasping for air.    Problems with coping with sadness, anxiety or depression. If you have concerns about hurting yourself or the baby, call your provider immediately.      You have questions or concerns after you return home.

## 2017-10-22 NOTE — PLAN OF CARE
Problem: Patient Care Overview  Goal: Plan of Care/Patient Progress Review  Outcome: Improving  PP assessment WNL. Vital signs stable. Pt up ad diana, steady gait. Intake and output remains appropriate. Encouraged fluid intake. Pain managed with Ibuprofen and tylenol with one time oxycodone for incisional pain and cramping-see MAR. Independent with breast and formula feeding.Utilizing double electric breast pump to manage comfort this am as she feels milk is coming in and is fending off engorgement. Steri strips remain in place dried serosangueinous drainage noted. Inter dry placed under pannus next to incision. No further concerns, will continue with pt plan of care.

## 2017-10-22 NOTE — PLAN OF CARE
Problem: Postpartum ( Delivery) (Adult,Obstetrics,Pediatric)  Goal: Signs and Symptoms of Listed Potential Problems Will be Absent, Minimized or Managed (Postpartum)  Signs and symptoms of listed potential problems will be absent, minimized or managed by discharge/transition of care (reference Postpartum ( Delivery) (Adult,Obstetrics,Pediatric) CPG).   Outcome: No Change  VSS and assessments WDL. Pain well managed with oral meds. Using binder for comfort. Passing flatus, BM today. Tolerating regular diet. Incision healing well, inter-dry in place. Independent with self and infant cares. Attempted to breastfeed infant after infant transferred from NICU, pt desires to formula and breast feed. Education given on positioning and hand expression. Continue with POC.

## 2017-10-22 NOTE — PLAN OF CARE
Problem: Postpartum ( Delivery) (Adult,Obstetrics,Pediatric)  Goal: Signs and Symptoms of Listed Potential Problems Will be Absent, Minimized or Managed (Postpartum)  Signs and symptoms of listed potential problems will be absent, minimized or managed by discharge/transition of care (reference Postpartum ( Delivery) (Adult,Obstetrics,Pediatric) CPG).   Outcome: Adequate for Discharge Date Met:  10/22/17  Data: Vital signs stable, assessments within normal limits.   Incision healing well, no signs of infection. Patient instructed of signs/symptoms to look for and report per discharge instructions.   Discharge outcomes on care plan met.   Pain well managed with oral meds, reviewed discharge medications and schedule.  Action: Review of care plan, teaching, and discharge instructions done with patient. Infant identification with ID bands done, mother verification with signature obtained.  Response: Mother states understanding and comfort with self cares and feeding. All questions addressed. Parents discharged with infant @ 1300.

## 2017-10-23 LAB — COPATH REPORT: NORMAL

## 2017-10-23 NOTE — TELEPHONE ENCOUNTER
, Argelia, has NBV 10/26/2017 at 1:00 with Dr. Castillo, and 2 Week WCC 2017 at 10:40 with Dr. Pierre before patients appointment same day with same provider at 11:00.  Mother has  visit and 2 week WCC scheduled at [facility], but states  will be seen at [facility] primarily.

## 2017-10-31 ENCOUNTER — OFFICE VISIT (OUTPATIENT)
Dept: FAMILY MEDICINE | Facility: CLINIC | Age: 37
End: 2017-10-31

## 2017-10-31 VITALS
HEART RATE: 69 BPM | TEMPERATURE: 98.4 F | RESPIRATION RATE: 16 BRPM | SYSTOLIC BLOOD PRESSURE: 122 MMHG | OXYGEN SATURATION: 99 % | BODY MASS INDEX: 35.1 KG/M2 | DIASTOLIC BLOOD PRESSURE: 87 MMHG | WEIGHT: 195 LBS

## 2017-10-31 DIAGNOSIS — Z51.89 VISIT FOR WOUND CHECK: Primary | ICD-10-CM

## 2017-10-31 ASSESSMENT — ENCOUNTER SYMPTOMS
VOMITING: 0
HEADACHES: 0
CONSTIPATION: 0
LIGHT-HEADEDNESS: 0
FEVER: 0
CHILLS: 0
DIARRHEA: 0
NERVOUS/ANXIOUS: 0
WOUND: 1
ABDOMINAL PAIN: 1
APPETITE CHANGE: 0
ABDOMINAL DISTENTION: 0
NAUSEA: 0
DYSURIA: 0
BACK PAIN: 0

## 2017-10-31 NOTE — NURSING NOTE
name: Yanni Willingham  Language: Gambian  Agency: [facility]  Phone number: [phone]

## 2017-10-31 NOTE — PROGRESS NOTES
HPI:       Marti Vale is a 37 year old who presents for the following  Patient presents with:  RECHECK: c section area. smelling bad, pain on right side        Concern: Concerns about  scar    Patient is 13 days s/p C/S (performed for repeat c-sections in past).  She states she has had an odor at site of scar for last 3 days.  Patient is washing area with hot water, no soap. Steri-strips are falling off.  She has mild pain RLQ, but this is unchanged since slipping in shower 1 week prior.  She denies any fevers, chills, discharge from incision or fluctuance around incision site.  Patient has mild vaginal bleeding.  Baby is doing well.       A BioMedFlex  was used for  this visit.      Problem, Medication and Allergy Lists were reviewed and are current.  Patient is an established patient of this clinic.         Review of Systems:   Review of Systems   Constitutional: Negative for appetite change, chills and fever.   Gastrointestinal: Positive for abdominal pain (mild pain RLQ). Negative for abdominal distention, constipation, diarrhea, nausea and vomiting.   Genitourinary: Negative for dysuria.   Musculoskeletal: Negative for back pain.   Skin: Positive for wound (odor at  scar).   Neurological: Negative for light-headedness and headaches.   Psychiatric/Behavioral: The patient is not nervous/anxious.              Physical Exam:   Patient Vitals for the past 24 hrs:   BP Temp Temp src Pulse Resp SpO2 Weight   10/31/17 1635 122/87 98.4  F (36.9  C) Oral 69 16 99 % 195 lb (88.5 kg)     Body mass index is 35.1 kg/(m^2).  Vitals were reviewed and were normal     Physical Exam   Constitutional: She is oriented to person, place, and time. She appears well-developed and well-nourished.   HENT:   Head: Normocephalic.   Neck: Normal range of motion. Neck supple.   Cardiovascular: Normal rate and regular rhythm.    No murmur heard.  Pulmonary/Chest: Effort normal and breath sounds normal.  No respiratory distress.   Abdominal: Soft. She exhibits no distension and no mass. There is tenderness. There is no rebound and no guarding.   Mild tenderness lateral to right side of  scar.  Foul odor present at site of  scar (low transverse) with steri-strips falling off.  No tenderness or fluctuance around site of incision.  Granulation tissue visible over right side of incision.  No swelling, no discharge, no abscess noted.   Musculoskeletal: Normal range of motion.   Neurological: She is alert and oriented to person, place, and time.   Skin: No rash noted. No erythema. No pallor.   Psychiatric: She has a normal mood and affect. Her behavior is normal. Judgment and thought content normal.   Vitals reviewed.        Results:     No labs ordered  Assessment and Plan     Marti was seen today for recheck.      Patient presents with odor at site of  wound.  Odor could indicate an abscess or infection; however, wound is healing well.  There is mild tenderness to palpation of RLQ, lateral to  wound.  Patient states this pain has been present for the last week, occurred after patient slipped in shower.  Wound has no evidence of infection- no erythema, no fluctuance, no discharge, no tenderness.  Odor may be from steri-strips that have been present for 13 days, so steri-strips removed in clinic today.  Advised patient to gently clean area above and below incision with soap and water and return to clinic in 2-3 days if she continues to have odor or if she develops pain, tenderness, fluctuance or discharge at site of wound.     Diagnoses and all orders for this visit:    Visit for wound check      There are no discontinued medications.  Options for treatment and follow-up care were reviewed with the patient. Marti Vale  engaged in the decision making process and verbalized understanding of the options discussed and agreed with the final plan.    Bhavna Mena M.D.  PGY-3,  Family Medicine

## 2017-10-31 NOTE — MR AVS SNAPSHOT
After Visit Summary   10/31/2017    Marti Vale    MRN: [id number]           Patient Information     Date Of Birth          1980        Visit Information        Provider Department      10/31/2017 4:20 PM Bhavna Mena MD [facility]        Today's Diagnoses     Visit for wound check    -  1       Follow-ups after your visit        Follow-up notes from your care team     Return in about 3 days (around 11/3/2017), or if symptoms worsen or fail to improve.      Your next 10 appointments already scheduled     2017 11:00 AM CST   Return Visit with MD [facility] ([facility])    [address],  [address]  [place] [address]   [phone]              Who to contact     Please call your clinic at [phone] to:    Ask questions about your health    Make or cancel appointments    Discuss your medicines    Learn about your test results    Speak to your doctor   If you have compliments or concerns about an experience at your clinic, or if you wish to file a complaint, please contact [facility] Patient Relations at [phone] or email us at [email or web address]         Additional Information About Your Visit        MyChart Information     [employer] is an electronic gateway that provides easy, online access to your medical records. With katena, you can request a clinic appointment, read your test results, renew a prescription or communicate with your care team.     To sign up for [employer] visit the website at [email or web address]   You will be asked to enter the access code listed below, as well as some personal information. Please follow the directions to create your username and password.     Your access code is: [email or web address]  Expires: 2017  3:20 PM     Your access code will  in 90 days. If you need help or a new code, please contact your  [facility] or call [phone] for assistance.        Care EveryWhere ID     This is your Care EveryWhere ID. This could be used by other organizations to access your [place] medical records  [id number]        Your Vitals Were     Pulse Temperature Respirations Last Period Pulse Oximetry BMI (Body Mass Index)    69 98.4  F (36.9  C) (Oral) 16 01/19/2017 99% 35.1 kg/m2       Blood Pressure from Last 3 Encounters:   11/02/17 110/74   10/31/17 122/87   10/22/17 114/64    Weight from Last 3 Encounters:   11/02/17 195 lb (88.5 kg)   10/31/17 195 lb (88.5 kg)   10/11/17 213 lb 9.6 oz (96.9 kg)              Today, you had the following     No orders found for display       Primary Care Provider Office Phone # Fax #    Bhavna Mena [phone]       [address] [facility] [address]        Equal Access to Services     CHEPE OBREIN : Hadii aad ku hadasho Soomaali, waaxda luqadaha, qaybta kaalmada adeegyada, waxay idiin haycrissyn sue matos . [facility] [phone].    ATENCIÓN: Si habla español, tiene a andre disposición servicios gratuitos de asistencia lingüística. [facility] [phone].    We comply with applicable federal civil rights laws and [place] laws. We do not discriminate on the basis of race, color, national origin, age, disability, sex, sexual orientation, or gender identity.            Thank you!     Thank you for choosing [facility]  for your care. Our goal is always to provide you with excellent care. Hearing back from our patients is one way we can continue to improve our services. Please take a few minutes to complete the written survey that you may receive in the mail after your visit with us. Thank you!             Your Updated Medication List - Protect others around you: Learn how to safely use, store and throw away your medicines at www.disposemymeds.org.          This list is accurate as of: 10/31/17 11:59 PM.   Always use your most recent med list.                   Brand Name Dispense Instructions for use Diagnosis    alum & mag hydroxide-simethicone 200-200-20 MG/5ML Susp suspension    MYLANTA/MAALOX    355 mL    Take 30 mLs by mouth every 6 hours as needed for heartburn    Gastroesophageal reflux disease, esophagitis presence not specified       ibuprofen 600 MG tablet    ADVIL/MOTRIN    60 tablet    Take 1 tablet (600 mg) by mouth every 6 hours as needed for other (cramping)    S/P  section       oxyCODONE IR 5 MG tablet    ROXICODONE    25 tablet    Take 1-2 tablets (5-10 mg) by mouth every 4 hours as needed for moderate to severe pain    S/P  section       ranitidine 300 MG tablet    ZANTAC    30 tablet    Take 1 tablet (300 mg) by mouth At Bedtime    Gastroesophageal reflux disease without esophagitis       SEA-BAND ADULT Belkys     1 each    1 Device as needed    Nausea       senna 8.6 MG tablet    SENOKOT    30 tablet    Take 1 tablet by mouth daily    Slow transit constipation       senna-docusate 8.6-50 MG per tablet    SENOKOT-S;PERICOLACE    60 tablet    Take 1-2 tablets by mouth 2 times daily    S/P  section

## 2017-10-31 NOTE — PROGRESS NOTES
Preceptor Attestation:   Patient seen and discussed with the resident. Assessment and plan reviewed with resident and agreed upon.   Supervising Physician:  Bhavna Calles MD  [facility]

## 2017-11-02 ENCOUNTER — OFFICE VISIT (OUTPATIENT)
Dept: FAMILY MEDICINE | Facility: CLINIC | Age: 37
End: 2017-11-02

## 2017-11-02 VITALS
DIASTOLIC BLOOD PRESSURE: 74 MMHG | HEART RATE: 87 BPM | TEMPERATURE: 97.8 F | SYSTOLIC BLOOD PRESSURE: 110 MMHG | WEIGHT: 195 LBS | BODY MASS INDEX: 35.1 KG/M2 | OXYGEN SATURATION: 95 % | RESPIRATION RATE: 18 BRPM

## 2017-11-02 DIAGNOSIS — Z32.01 PREGNANCY EXAMINATION OR TEST, POSITIVE RESULT: ICD-10-CM

## 2017-11-02 DIAGNOSIS — E55.9 VITAMIN D DEFICIENCY: ICD-10-CM

## 2017-11-02 DIAGNOSIS — Z00.00 HEALTHCARE MAINTENANCE: ICD-10-CM

## 2017-11-02 DIAGNOSIS — T14.8XXA LOCAL INFECTION OF WOUND: Primary | ICD-10-CM

## 2017-11-02 DIAGNOSIS — L08.9 LOCAL INFECTION OF WOUND: Primary | ICD-10-CM

## 2017-11-02 RX ORDER — ACETAMINOPHEN 325 MG/1
325-650 TABLET ORAL EVERY 4 HOURS PRN
Qty: 100 TABLET | Refills: 0 | Status: SHIPPED | OUTPATIENT
Start: 2017-11-02 | End: 2020-03-06

## 2017-11-02 RX ORDER — PNV NO.118/IRON FUMARATE/FA 29 MG-1 MG
1 TABLET,CHEWABLE ORAL DAILY
Qty: 90 TABLET | Refills: 3 | Status: SHIPPED | OUTPATIENT
Start: 2017-11-02 | End: 2018-05-21

## 2017-11-02 RX ORDER — NEOMYCIN/BACITRACIN/POLYMYXINB 3.5-400-5K
OINTMENT (GRAM) TOPICAL 4 TIMES DAILY
Qty: 15 G | Refills: 0 | Status: SHIPPED | OUTPATIENT
Start: 2017-11-02 | End: 2017-12-18

## 2017-11-02 NOTE — MR AVS SNAPSHOT
After Visit Summary   11/2/2017    Marti Vale    MRN: [id number]           Patient Information     Date Of Birth          1980        Visit Information        Provider Department      11/2/2017 11:00 AM Monica Pierre MD [facility]        Today's Diagnoses     Local infection of wound    -  1    Healthcare maintenance        Vitamin D deficiency        Pregnancy examination or test, positive result          Care Instructions    Here is the plan from today's visit    1. Local infection of wound  - neomycin-bacitracin-polymyxin (NEOSPORIN) 5-400-5000 ointment; Apply topically 4 times daily  Dispense: 15 g; Refill: 0  - take a shower 3 times daily  - acetaminophen (TYLENOL) 325 MG tablet; Take 1-2 tablets (325-650 mg) by mouth every 4 hours as needed for mild pain or fever Reported on 5/15/2017  Dispense: 100 tablet; Refill: 0    2. Vitamin D deficiency  - Cholecalciferol 4000 UNITS CAPS; Take 2,000 Units by mouth daily  Dispense: 30 capsule; Refill: 3    3. Pregnancy examination or test, positive result  - Prenatal Vit-Fe Fumarate-FA (PRENATAL 19) CHEW; Take 1 tablet by mouth daily  Dispense: 90 tablet; Refill: 3      Please call or return to clinic if your symptoms don't go away.    Follow up plan  Please make a clinic appointment for follow up with me (MONICA PIERRE) in 1  week for wound check.    Thank you for coming to [facility] today.  Lab Testing:  **If you had lab testing today and your results are reassuring or normal they will be mailed to you or sent through [email or web address] within 7 days.   **If the lab tests need quick action we will call you with the results.  The phone number we will call with results is # [phone] (home) . If this is not the best number please call our clinic and change the number.  Medication Refills:  If you need any refills please call your pharmacy and they will contact us.   If you need to  your refill at a new  pharmacy, please contact the new pharmacy directly. The new pharmacy will help you get your medications transferred faster.   Scheduling:  If you have any concerns about today's visit or wish to schedule another appointment please call our office during normal business hours [phone] (8-5:00 M-F)  If a referral was made to a [facility] and you don't get a call from central scheduling please call [phone].  If a Mammogram was ordered for you at [facility] call [phone] to schedule or change your appointment.  If you had an XRay/CT/Ultrasound/MRI ordered the number is [phone] to schedule or change your radiology appointment.   Medical Concerns:  If you have urgent medical concerns please call [phone] at any time of the day.            Follow-ups after your visit        Who to contact     Please call your clinic at [phone] to:    Ask questions about your health    Make or cancel appointments    Discuss your medicines    Learn about your test results    Speak to your doctor   If you have compliments or concerns about an experience at your clinic, or if you wish to file a complaint, please contact [facility] Patient Relations at [phone] or email us at [email or web address]         Additional Information About Your Visit        MyChart Information     [employer]iPro[employer]at is an electronic gateway that provides easy, online access to your medical records. With [employer], you can request a clinic appointment, read your test results, renew a prescription or communicate with your care team.     To sign up for [employer] visit the website at [email or web address]   You will be asked to enter the access code listed below, as well as some personal information. Please follow the directions to create your username and password.     Your access code is: [email or web address]  Expires: 2017  3:20 PM     Your access code will  in 90 days. If  you need help or a new code, please contact your [facility] or call [phone] for assistance.        Care EveryWhere ID     This is your Care EveryWhere ID. This could be used by other organizations to access your [place] medical records  [id number]        Your Vitals Were     Pulse Temperature Respirations Last Period Pulse Oximetry BMI (Body Mass Index)    87 97.8  F (36.6  C) (Oral) 18 01/19/2017 95% 35.1 kg/m2       Blood Pressure from Last 3 Encounters:   11/02/17 110/74   10/31/17 122/87   10/22/17 114/64    Weight from Last 3 Encounters:   11/02/17 195 lb (88.5 kg)   10/31/17 195 lb (88.5 kg)   10/11/17 213 lb 9.6 oz (96.9 kg)              Today, you had the following     No orders found for display         Today's Medication Changes          These changes are accurate as of: 11/2/17 11:52 AM.  If you have any questions, ask your nurse or doctor.               Start taking these medicines.        Dose/Directions    neomycin-bacitracin-polymyxin 5-400-5000 ointment   Used for:  Local infection of wound   Started by:  Saundra Pierre MD        Apply topically 4 times daily   Quantity:  15 g   Refills:  0         These medicines have changed or have updated prescriptions.        Dose/Directions    Cholecalciferol 4000 UNITS Caps   This may have changed:  how much to take   Used for:  Vitamin D deficiency   Changed by:  Saundra Pierre MD        Dose:  2000 Units   Take 2,000 Units by mouth daily   Quantity:  30 capsule   Refills:  3         Stop taking these medicines if you haven't already. Please contact your care team if you have questions.     oxyCODONE IR 5 MG tablet   Commonly known as:  ROXICODONE   Stopped by:  Saundra Pierre MD                Where to get your medicines      These medications were sent to [facility] [place], [place] - [address] [facility] [address]     Phone:  [phone]     acetaminophen 325 MG  tablet    Cholecalciferol 4000 UNITS Caps    neomycin-bacitracin-polymyxin 5-400-5000 ointment    PRENATAL 19 Chew                Primary Care Provider Office Phone # Fax #    Bhavna Mena [phone]       [address] [facility] [address]        Equal Access to Services     CHEPE OBRIEN : Hadii aad ku hadasho Soomaali, waaxda luqadaha, qaybta kaalmada adeegyada, waxdaphne sabiha sharlaflor mareshilariodulce antoine. So [facility] [phone].    ATENCIÓN: Si habla español, tiene a andre disposición servicios gratuitos de asistencia lingüística. [facility] [phone].    We comply with applicable federal civil rights laws and [place] laws. We do not discriminate on the basis of race, color, national origin, age, disability, sex, sexual orientation, or gender identity.            Thank you!     Thank you for choosing [facility]  for your care. Our goal is always to provide you with excellent care. Hearing back from our patients is one way we can continue to improve our services. Please take a few minutes to complete the written survey that you may receive in the mail after your visit with us. Thank you!             Your Updated Medication List - Protect others around you: Learn how to safely use, store and throw away your medicines at www.disposemymeds.org.          This list is accurate as of: 11/2/17 11:52 AM.  Always use your most recent med list.                   Brand Name Dispense Instructions for use Diagnosis    acetaminophen 325 MG tablet    TYLENOL    100 tablet    Take 1-2 tablets (325-650 mg) by mouth every 4 hours as needed for mild pain or fever Reported on 5/15/2017    Healthcare maintenance       alum & mag hydroxide-simethicone 200-200-20 MG/5ML Susp suspension    MYLANTA/MAALOX    355 mL    Take 30 mLs by mouth every 6 hours as needed for heartburn    Gastroesophageal reflux disease, esophagitis presence not specified       Cholecalciferol 4000 UNITS Caps     30  capsule    Take 2,000 Units by mouth daily    Vitamin D deficiency       ibuprofen 600 MG tablet    ADVIL/MOTRIN    60 tablet    Take 1 tablet (600 mg) by mouth every 6 hours as needed for other (cramping)    S/P  section       neomycin-bacitracin-polymyxin 5-400-5000 ointment     15 g    Apply topically 4 times daily    Local infection of wound       PRENATAL 19 Chew     90 tablet    Take 1 tablet by mouth daily    Pregnancy examination or test, positive result       ranitidine 300 MG tablet    ZANTAC    30 tablet    Take 1 tablet (300 mg) by mouth At Bedtime    Gastroesophageal reflux disease without esophagitis       SEA-BAND ADULT Belkys     1 each    1 Device as needed    Nausea       senna 8.6 MG tablet    SENOKOT    30 tablet    Take 1 tablet by mouth daily    Slow transit constipation       senna-docusate 8.6-50 MG per tablet    SENOKOT-S;PERICOLACE    60 tablet    Take 1-2 tablets by mouth 2 times daily    S/P  section

## 2017-11-02 NOTE — PATIENT INSTRUCTIONS
Here is the plan from today's visit    1. Local infection of wound  - neomycin-bacitracin-polymyxin (NEOSPORIN) 5-400-5000 ointment; Apply topically 4 times daily  Dispense: 15 g; Refill: 0  - take a shower 3 times daily  - acetaminophen (TYLENOL) 325 MG tablet; Take 1-2 tablets (325-650 mg) by mouth every 4 hours as needed for mild pain or fever Reported on 5/15/2017  Dispense: 100 tablet; Refill: 0    2. Vitamin D deficiency  - Cholecalciferol 4000 UNITS CAPS; Take 2,000 Units by mouth daily  Dispense: 30 capsule; Refill: 3    3. Pregnancy examination or test, positive result  - Prenatal Vit-Fe Fumarate-FA (PRENATAL 19) CHEW; Take 1 tablet by mouth daily  Dispense: 90 tablet; Refill: 3      Please call or return to clinic if your symptoms don't go away.    Follow up plan  Please make a clinic appointment for follow up with me (MONICA RAM) in 1  week for wound check.    Thank you for coming to [facility] today.  Lab Testing:  **If you had lab testing today and your results are reassuring or normal they will be mailed to you or sent through [employer] within 7 days.   **If the lab tests need quick action we will call you with the results.  The phone number we will call with results is # [phone] (home) . If this is not the best number please call our clinic and change the number.  Medication Refills:  If you need any refills please call your pharmacy and they will contact us.   If you need to  your refill at a new pharmacy, please contact the new pharmacy directly. The new pharmacy will help you get your medications transferred faster.   Scheduling:  If you have any concerns about today's visit or wish to schedule another appointment please call our office during normal business hours [phone] (8-5:00 M-F)  If a referral was made to a [facility] and you don't get a call from central scheduling please call [phone].  If a [facility] was ordered for you at [facility] call [phone] to schedule or change your appointment.  If you had an XRay/CT/Ultrasound/MRI ordered the number is [phone] to schedule or change your radiology appointment.   Medical Concerns:  If you have urgent medical concerns please call [phone] at any time of the day.

## 2017-11-02 NOTE — PROGRESS NOTES
Preceptor Attestation:   Patient seen and discussed with the resident. Assessment and plan reviewed with resident and agreed upon.   Supervising Physician:  Abdullahi Mae MD MD  [facility]

## 2017-11-02 NOTE — PROGRESS NOTES
"      HPI:       Marti Vale is a 37 year old who presents for the following  Patient presents with:  Postpartum Care: postpartum f/u. recheck c section    Patient is coming for follow up for her incision;s infection. She underwent a  10/19/2017. She was seen in the clinic on 10/31/2017 for  wound with foul smell and serous discharge. She was advised to gently clean area above and below incision with soap and water and return to clinic in 2-3 days if she continues to have odor or if she develops pain, tenderness, fluctuance or discharge at site of wound. Today she states that her wound is still mildly tender especially in RLQ. She reports yellow \"discharge\" from the wound. She denies any fever, chills or any foul smelling vaginal discharge.     A Unda  was used for this visit.      Problem, Medication and Allergy Lists were reviewed and are current.  Patient is an established patient of this clinic.         Review of Systems:   Review of Systems   Constitutional: Positive for fatigue. Negative for activity change, appetite change, chills, fever and unexpected weight change.   Skin: Positive for wound (in the  area with foul smell).             Physical Exam:   Patient Vitals for the past 24 hrs:   BP Temp Temp src Pulse Resp SpO2 Weight   17 1102 110/74 97.8  F (36.6  C) Oral 87 18 95 % 195 lb (88.5 kg)     Body mass index is 35.1 kg/(m^2).  Vitals were reviewed and were normal     Physical Exam   Constitutional: She is oriented to person, place, and time. She appears well-developed and well-nourished. No distress.   HENT:   Head: Normocephalic and atraumatic.   Eyes: Conjunctivae are normal.   Neck: Normal range of motion. Neck supple.   Cardiovascular: Normal rate, regular rhythm and normal heart sounds.    No murmur heard.  Pulmonary/Chest: Effort normal and breath sounds normal.   Abdominal: Soft. Bowel sounds are normal.   Musculoskeletal: Normal range of " motion.   Neurological: She is alert and oriented to person, place, and time.   Skin: Skin is warm and dry. No rash noted. She is not diaphoretic. No erythema.   There is an area of not well adjusted skin at the  scar which cases serous discharge. There is no sign of redness, swelling or fluctuation. There is mild tenderness in the RLQ.    Psychiatric: She has a normal mood and affect.         Results:     No pending results  Assessment and Plan     1. Local infection of wound  - neomycin-bacitracin-polymyxin (NEOSPORIN) 5-400-5000 ointment; Apply topically 4 times daily  Dispense: 15 g; Refill: 0  - take a shower 3 times daily  - acetaminophen (TYLENOL) 325 MG tablet; Take 1-2 tablets (325-650 mg) by mouth every 4 hours as needed for mild pain or fever Reported on 5/15/2017  Dispense: 100 tablet; Refill: 0    2. Vitamin D deficiency  - Cholecalciferol 4000 UNITS CAPS; Take 2,000 Units by mouth daily  Dispense: 30 capsule; Refill: 3    3. Breast feeding:   - Prenatal Vit-Fe Fumarate-FA (PRENATAL 19) CHEW; Take 1 tablet by mouth daily  Dispense: 90 tablet; Refill: 3    4. Discussed contraception:  - patient is thinking about cupper IUD    5. The patient needs PAP smear with HPV - last PAP smear done in  according to Care Everywhere.   - consider to obtain [facility] appointment      Follow up in 1 week for wound check and possible PAP smear.      There are no discontinued medications.  Options for treatment and follow-up care were reviewed with the patient. Marti Vale  engaged in the decision making process and verbalized understanding of the options discussed and agreed with the final plan.    Saundra Pierre MD  [facility],  PGY-3 Family Medicine Resident  #[id number]

## 2017-11-03 RX ORDER — CHOLECALCIFEROL (VITAMIN D3) 50 MCG
2000 TABLET ORAL DAILY
Qty: 100 TABLET | Refills: 3 | Status: SHIPPED | OUTPATIENT
Start: 2017-11-03

## 2017-11-08 PROBLEM — O36.63X0 MACROSOMIA OF FETUS AFFECTING MANAGEMENT OF MOTHER IN THIRD TRIMESTER, SINGLE OR UNSPECIFIED FETUS: Status: RESOLVED | Noted: 2017-10-11 | Resolved: 2017-11-08

## 2017-11-08 PROBLEM — O09.529 HIGH-RISK PREGNANCY, ELDERLY MULTIGRAVIDA, UNSPECIFIED TRIMESTER: Status: RESOLVED | Noted: 2017-03-08 | Resolved: 2017-11-08

## 2017-11-08 ASSESSMENT — ENCOUNTER SYMPTOMS
CHILLS: 0
UNEXPECTED WEIGHT CHANGE: 0
WOUND: 1
FEVER: 0
FATIGUE: 1
APPETITE CHANGE: 0
ACTIVITY CHANGE: 0

## 2017-11-09 ENCOUNTER — OFFICE VISIT (OUTPATIENT)
Dept: FAMILY MEDICINE | Facility: CLINIC | Age: 37
End: 2017-11-09

## 2017-11-09 VITALS
TEMPERATURE: 97.9 F | BODY MASS INDEX: 35.03 KG/M2 | RESPIRATION RATE: 18 BRPM | OXYGEN SATURATION: 97 % | DIASTOLIC BLOOD PRESSURE: 71 MMHG | SYSTOLIC BLOOD PRESSURE: 104 MMHG | WEIGHT: 194.6 LBS | HEART RATE: 66 BPM

## 2017-11-09 DIAGNOSIS — K59.01 SLOW TRANSIT CONSTIPATION: ICD-10-CM

## 2017-11-09 RX ORDER — POLYETHYLENE GLYCOL 3350 17 G/17G
1 POWDER, FOR SOLUTION ORAL DAILY
Qty: 510 G | Refills: 1 | Status: SHIPPED | OUTPATIENT
Start: 2017-11-09 | End: 2020-03-06

## 2017-11-09 ASSESSMENT — ENCOUNTER SYMPTOMS
FATIGUE: 0
FEVER: 0
WOUND: 1
ACTIVITY CHANGE: 0

## 2017-11-09 NOTE — MR AVS SNAPSHOT
After Visit Summary   2017    Marti Vale    MRN: [id number]           Patient Information     Date Of Birth          1980        Visit Information        Provider Department      2017 11:00 AM Saundra Pierre MD [facility]        Today's Diagnoses      wound infection    -  1    Slow transit constipation          Care Instructions    Follow up for 6 weeks postpartum          Follow-ups after your visit        Your next 10 appointments already scheduled     Dec 04, 2017  1:40 PM CST   Return Visit with [facility] ([facility])    [address],  [address]  [place] [address]   [phone]              Who to contact     Please call your clinic at [phone] to:    Ask questions about your health    Make or cancel appointments    Discuss your medicines    Learn about your test results    Speak to your doctor   If you have compliments or concerns about an experience at your clinic, or if you wish to file a complaint, please contact [facility] Patient Relations at [phone] or email us at [email or web address]         Additional Information About Your Visit        MyChart Information     [employer] is an electronic gateway that provides easy, online access to your medical records. With [employer], you can request a clinic appointment, read your test results, renew a prescription or communicate with your care team.     To sign up for [employer] visit the website at [email or web address]   You will be asked to enter the access code listed below, as well as some personal information. Please follow the directions to create your username and password.     Your access code is: [email or web address]  Expires: 2017  2:20 PM     Your access code will  in 90 days. If you need help or a new code, please contact your [facility]  Clinic or call [phone] for assistance.        Care EveryWhere ID     This is your Care EveryWhere ID. This could be used by other organizations to access your [place] medical records  [id number]        Your Vitals Were     Pulse Temperature Respirations Last Period Pulse Oximetry BMI (Body Mass Index)    66 97.9  F (36.6  C) (Oral) 18 01/19/2017 97% 35.03 kg/m2       Blood Pressure from Last 3 Encounters:   11/09/17 104/71   11/02/17 110/74   10/31/17 122/87    Weight from Last 3 Encounters:   11/09/17 194 lb 9.6 oz (88.3 kg)   11/02/17 195 lb (88.5 kg)   10/31/17 195 lb (88.5 kg)              Today, you had the following     No orders found for display         Today's Medication Changes          These changes are accurate as of: 11/9/17 11:59 PM.  If you have any questions, ask your nurse or doctor.               Start taking these medicines.        Dose/Directions    polyethylene glycol powder   Commonly known as:  MIRALAX   Used for:  Slow transit constipation   Started by:  Saundra Pierre MD        Dose:  1 capful   Take 17 g (1 capful) by mouth daily   Quantity:  510 g   Refills:  1            Where to get your medicines      These medications were sent to [place] [address] [place], [place] - [address] [facility]  [address] [facility] [address] [facility] [address]     Phone:  [phone]     polyethylene glycol powder                Primary Care Provider Office Phone # Fax #    Bhavna Milana Mena [phone]       [address] [facility] [address]        Equal Access to Services     [facility] Hospital[facility]FF : Hadii ehsan singh Sochiquita, waaxda luqadaha, qaybta kaalmada wesley caraballo . So [facility] [phone].    ATENCIÓN: Si habla español, tiene a andre disposición servicios gratuitos de asistencia lingüística. Llame al [phone].    We comply with applicable federal civil rights laws and [place] laws. We do not discriminate on the basis of race,  color, national origin, age, disability, sex, sexual orientation, or gender identity.            Thank you!     Thank you for choosing [facility]  for your care. Our goal is always to provide you with excellent care. Hearing back from our patients is one way we can continue to improve our services. Please take a few minutes to complete the written survey that you may receive in the mail after your visit with us. Thank you!             Your Updated Medication List - Protect others around you: Learn how to safely use, store and throw away your medicines at www.disposemymeds.org.          This list is accurate as of: 17 11:59 PM.  Always use your most recent med list.                   Brand Name Dispense Instructions for use Diagnosis    acetaminophen 325 MG tablet    TYLENOL    100 tablet    Take 1-2 tablets (325-650 mg) by mouth every 4 hours as needed for mild pain or fever Reported on 5/15/2017    Healthcare maintenance       alum & mag hydroxide-simethicone 200-200-20 MG/5ML Susp suspension    MYLANTA/MAALOX    355 mL    Take 30 mLs by mouth every 6 hours as needed for heartburn    Gastroesophageal reflux disease, esophagitis presence not specified       ibuprofen 600 MG tablet    ADVIL/MOTRIN    60 tablet    Take 1 tablet (600 mg) by mouth every 6 hours as needed for other (cramping)    S/P  section       neomycin-bacitracin-polymyxin 5-400-5000 ointment     15 g    Apply topically 4 times daily    Local infection of wound       polyethylene glycol powder    MIRALAX    510 g    Take 17 g (1 capful) by mouth daily    Slow transit constipation       PRENATAL 19 Chew     90 tablet    Take 1 tablet by mouth daily    Pregnancy examination or test, positive result       ranitidine 300 MG tablet    ZANTAC    30 tablet    Take 1 tablet (300 mg) by mouth At Bedtime    Gastroesophageal reflux disease without esophagitis       SEA-BAND ADULT Belkys     1 each    1 Device as needed    Nausea        senna 8.6 MG tablet    SENOKOT    30 tablet    Take 1 tablet by mouth daily    Slow transit constipation       senna-docusate 8.6-50 MG per tablet    SENOKOT-S;PERICOLACE    60 tablet    Take 1-2 tablets by mouth 2 times daily    S/P  section       vitamin D 2000 UNITS tablet     100 tablet    Take 2,000 Units by mouth daily    Vitamin D deficiency

## 2017-11-09 NOTE — PROGRESS NOTES
HPI:       Marti Vale is a 37 year old who presents for the following  Patient presents with:  RECHECK: Follow up,   WOUND CARE    Patient is coming for follow up for her incision's infection after  10/19/2017. She started using topical antibiotics and stating that she feels much better. She reports less pain in the incision area and no odor anymore. No fever, no chills, no discharge.      A Mapflow  was used for  this visit.      Problem, Medication and Allergy Lists were reviewed and are current.  Patient is an established patient of this clinic.         Review of Systems:   Review of Systems   Constitutional: Negative for activity change, fatigue and fever.   Skin: Positive for wound (improving).   All other systems reviewed and are negative.            Physical Exam:   Patient Vitals for the past 24 hrs:   BP Temp Temp src Pulse Resp SpO2 Weight   17 1107 104/71 97.9  F (36.6  C) Oral 66 18 97 % 194 lb 9.6 oz (88.3 kg)     Body mass index is 35.03 kg/(m^2).  Vitals were reviewed and were normal     Physical Exam   Constitutional: She is oriented to person, place, and time. She appears well-developed and well-nourished. No distress.   HENT:   Head: Normocephalic and atraumatic.   Eyes: Conjunctivae are normal.   Neck: Normal range of motion. Neck supple.   Cardiovascular: Normal rate, regular rhythm and normal heart sounds.    No murmur heard.  Pulmonary/Chest: Effort normal and breath sounds normal.   Abdominal: Soft. Bowel sounds are normal.       Musculoskeletal: Normal range of motion.   Neurological: She is alert and oriented to person, place, and time.   Skin: Skin is warm and dry. No rash noted. She is not diaphoretic. No erythema.   There is an area of not well adjusted skin at the  scar - healed well. There is no sign of redness, swelling or fluctuation.   Psychiatric: She has a normal mood and affect.         Results:     No pending results  Assessment  and Plan     1.  wound infection, improvied  - may use topical antibiotic on the very small opening  - follow up fr 6 weeks postpartum  - discussed IUD placement and PAP smear - will perform after 6 weeks postpartum    2. Slow transit constipation  - polyethylene glycol (MIRALAX) powder; Take 17 g (1 capful) by mouth daily  Dispense: 510 g; Refill: 1    There are no discontinued medications.  Options for treatment and follow-up care were reviewed with the patient. Marti Vale  engaged in the decision making process and verbalized understanding of the options discussed and agreed with the final plan.    Saundra Pierre MD  [facility],  PGY-3 Family Medicine Resident  #[id number]

## 2017-11-10 NOTE — PROGRESS NOTES
Preceptor Attestation:   Patient seen and discussed with the resident. Assessment and plan reviewed with resident and agreed upon.   Supervising Physician:  Amalia Jean MD

## 2017-12-12 ENCOUNTER — OFFICE VISIT (OUTPATIENT)
Dept: FAMILY MEDICINE | Facility: CLINIC | Age: 37
End: 2017-12-12

## 2017-12-12 VITALS
TEMPERATURE: 97.7 F | BODY MASS INDEX: 35.75 KG/M2 | WEIGHT: 198.6 LBS | HEART RATE: 65 BPM | OXYGEN SATURATION: 100 % | DIASTOLIC BLOOD PRESSURE: 71 MMHG | SYSTOLIC BLOOD PRESSURE: 111 MMHG

## 2017-12-12 DIAGNOSIS — R10.2 VAGINAL PAIN: Primary | ICD-10-CM

## 2017-12-12 DIAGNOSIS — B96.89 BV (BACTERIAL VAGINOSIS): ICD-10-CM

## 2017-12-12 DIAGNOSIS — N76.0 BV (BACTERIAL VAGINOSIS): ICD-10-CM

## 2017-12-12 DIAGNOSIS — Z30.09 GENERAL COUNSELING FOR PRESCRIPTION OF ORAL CONTRACEPTIVES: ICD-10-CM

## 2017-12-12 DIAGNOSIS — N92.1 METRORRHAGIA: ICD-10-CM

## 2017-12-12 LAB
BACTERIA: NORMAL
CLUE CELLS: NORMAL
MOTILE TRICHOMONAS: NEGATIVE
ODOR: POSITIVE
PH WET PREP: >4.5
WBC WET PREP: NORMAL
YEAST: NORMAL

## 2017-12-12 RX ORDER — METRONIDAZOLE 7.5 MG/G
1 GEL VAGINAL 2 TIMES DAILY
Qty: 70 G | Refills: 0 | Status: SHIPPED | OUTPATIENT
Start: 2017-12-12 | End: 2017-12-17

## 2017-12-12 NOTE — Clinical Note
To Lei Davidson and Anahi: Pt is almost 9 weeks postpartum with spotting. Pelvic US performed on 12/13/17 is concerning for large hematoma (5cm). Does this patient requires referral for OB/GYN colleagues or can we remove it in out GYN procedure clinic while putting IUD? Thank you, appreciate your help. Bird BLANCHARD I did not received results automatically so I just saw them today.    [Recommend Follow Up: Complex endometrial collection]

## 2017-12-12 NOTE — MR AVS SNAPSHOT
After Visit Summary   12/12/2017    Marti Vale    MRN: [id number]           Patient Information     Date Of Birth          1980        Visit Information        Provider Department      12/12/2017 11:00 AM Monica Pierre MD [facility]        Today's Diagnoses     Vaginal pain    -  1    Metrorrhagia          Care Instructions    Here is the plan from today's visit    1. Vaginal pain  - Wet Prep ([facility])  - US Pel W/Trans*; Future    2. Metrorrhagia  - US Pel W/Trans*; Future    Please call or return to clinic if your symptoms don't go away.    Follow up plan  Please make a clinic appointment for follow up with me (MONICA PIERRE) in 1  week for vaginal pain.    Thank you for coming to [facility] today.  Lab Testing:  **If you had lab testing today and your results are reassuring or normal they will be mailed to you or sent through [employer] within 7 days.   **If the lab tests need quick action we will call you with the results.  The phone number we will call with results is # [phone] (home) . If this is not the best number please call our clinic and change the number.  Medication Refills:  If you need any refills please call your pharmacy and they will contact us.   If you need to  your refill at a new pharmacy, please contact the new pharmacy directly. The new pharmacy will help you get your medications transferred faster.   Scheduling:  If you have any concerns about today's visit or wish to schedule another appointment please call our office during normal business hours [phone] (8-5:00 M-F)  If a referral was made to a [facility] and you don't get a call from central scheduling please call [phone].  If a Mammogram was ordered for you at [facility] call [phone] to schedule or change your appointment.  If you had an XRay/CT/Ultrasound/MRI ordered the number is [phone] to schedule or change  your radiology appointment.   Medical Concerns:  If you have urgent medical concerns please call [phone] at any time of the day.            Follow-ups after your visit        Future tests that were ordered for you today     Open Future Orders        Priority Expected Expires Ordered    US Pel W/Trans* Routine  2018            Who to contact     Please call your clinic at [phone] to:    Ask questions about your health    Make or cancel appointments    Discuss your medicines    Learn about your test results    Speak to your doctor   If you have compliments or concerns about an experience at your clinic, or if you wish to file a complaint, please contact [facility] Patient Relations at [phone] or email us at [email or web address]         Additional Information About Your Visit        [employer] Information     [employer] is an electronic gateway that provides easy, online access to your medical records. With [employer], you can request a clinic appointment, read your test results, renew a prescription or communicate with your care team.     To sign up for [employer] visit the website at [email or web address]   You will be asked to enter the access code listed below, as well as some personal information. Please follow the directions to create your username and password.     Your access code is: [email or web address]  Expires: 2017  2:20 PM     Your access code will  in 90 days. If you need help or a new code, please contact your [facility] or call [phone] for assistance.        Care EveryWhere ID     This is your Care EveryWhere ID. This could be used by other organizations to access your [facility] medical records  [id number]        Your Vitals Were     Pulse Temperature Last Period Pulse Oximetry Breastfeeding? BMI (Body Mass Index)    65 97.7  F (36.5  C) (Oral) 2017 100% Yes 35.75 kg/m2       Blood Pressure  from Last 3 Encounters:   12/12/17 111/71   11/09/17 104/71   11/02/17 110/74    Weight from Last 3 Encounters:   12/12/17 198 lb 9.6 oz (90.1 kg)   11/09/17 194 lb 9.6 oz (88.3 kg)   11/02/17 195 lb (88.5 kg)              We Performed the Following     Wet Prep ([facility])        Primary Care Provider Office Phone # Fax #    hBavna Mena [phone]       [address] [facility] [address]        Equal Access to Services     [facility]: Hadii aad ku hadasho Soomaali, waaxda luqadaha, qaybta kaalmada adesarahyanohemy, wesley matos . So [facility] [phone].    ATENCIÓN: Si habla español, tiene a andre disposición servicios gratuitos de asistencia lingüística. [facility] [phone].    We comply with applicable federal civil rights laws and [place] laws. We do not discriminate on the basis of race, color, national origin, age, disability, sex, sexual orientation, or gender identity.            Thank you!     Thank you for choosing [facility]  for your care. Our goal is always to provide you with excellent care. Hearing back from our patients is one way we can continue to improve our services. Please take a few minutes to complete the written survey that you may receive in the mail after your visit with us. Thank you!             Your Updated Medication List - Protect others around you: Learn how to safely use, store and throw away your medicines at www.disposemymeds.org.          This list is accurate as of: 12/12/17 12:07 PM.  Always use your most recent med list.                   Brand Name Dispense Instructions for use Diagnosis    acetaminophen 325 MG tablet    TYLENOL    100 tablet    Take 1-2 tablets (325-650 mg) by mouth every 4 hours as needed for mild pain or fever Reported on 5/15/2017    Healthcare maintenance       alum & mag hydroxide-simethicone 200-200-20 MG/5ML Susp suspension    MYLANTA/MAALOX    355 mL    Take 30 mLs by mouth every  6 hours as needed for heartburn    Gastroesophageal reflux disease, esophagitis presence not specified       ibuprofen 600 MG tablet    ADVIL/MOTRIN    60 tablet    Take 1 tablet (600 mg) by mouth every 6 hours as needed for other (cramping)    S/P  section       neomycin-bacitracin-polymyxin 5-400-5000 ointment     15 g    Apply topically 4 times daily    Local infection of wound       polyethylene glycol powder    MIRALAX    510 g    Take 17 g (1 capful) by mouth daily    Slow transit constipation       PRENATAL 19 Chew     90 tablet    Take 1 tablet by mouth daily    Pregnancy examination or test, positive result       ranitidine 300 MG tablet    ZANTAC    30 tablet    Take 1 tablet (300 mg) by mouth At Bedtime    Gastroesophageal reflux disease without esophagitis       SEA-BAND ADULT Belkys     1 each    1 Device as needed    Nausea       senna 8.6 MG tablet    SENOKOT    30 tablet    Take 1 tablet by mouth daily    Slow transit constipation       senna-docusate 8.6-50 MG per tablet    SENOKOT-S;PERICOLACE    60 tablet    Take 1-2 tablets by mouth 2 times daily    S/P  section       vitamin D 2000 UNITS tablet     100 tablet    Take 2,000 Units by mouth daily    Vitamin D deficiency          4

## 2017-12-12 NOTE — PATIENT INSTRUCTIONS
Here is the plan from today's visit    1. Vaginal pain  - Wet Prep ([facility])  - US Pel W/Trans*; Future    2. Metrorrhagia  - US Pel W/Trans*; Future    Please call or return to clinic if your symptoms don't go away.    Follow up plan  Please make a clinic appointment for follow up with me (YO MONICA) in 1  week for vaginal pain.    Thank you for coming to [facility] today.  Lab Testing:  **If you had lab testing today and your results are reassuring or normal they will be mailed to you or sent through [employer] within 7 days.   **If the lab tests need quick action we will call you with the results.  The phone number we will call with results is # [phone] (home) . If this is not the best number please call our clinic and change the number.  Medication Refills:  If you need any refills please call your pharmacy and they will contact us.   If you need to  your refill at a new pharmacy, please contact the new pharmacy directly. The new pharmacy will help you get your medications transferred faster.   Scheduling:  If you have any concerns about today's visit or wish to schedule another appointment please call our office during normal business hours [phone] (8-5:00 M-F)  If a referral was made to a [facility] and you don't get a call from central scheduling please call [phone].  If a Mammogram was ordered for you at [facility] call [phone] to schedule or change your appointment.  If you had an XRay/CT/Ultrasound/MRI ordered the number is [phone] to schedule or change your radiology appointment.   Medical Concerns:  If you have urgent medical concerns please call [phone] at any time of the day.

## 2017-12-12 NOTE — PROGRESS NOTES
HPI-Post Partum Visit -       Marti Mario Vale is a 37 year old  female  without a significant past medical history who presents for a postpartum visit.   Obstetric History:  Obstetric History       T6      L6     SAB2   TAB0   Ectopic0   Multiple0   Live Births6       # Outcome Date GA Lbr Rodrigue/2nd Weight Sex Delivery Anes PTL Lv   8 Term 10/19/17 39w0d   F CS-LTranv Spinal N FABRICIO      Name: ESTRADA,AMANDEEP MORTENSEN      Apgar1:  1                Apgar5: 4   7 2016           6 2015           5 Term 2009     CS-LTranv   FABRICIO   4 Term 2008     CS-LTranv   FABRICIO   3 Term 2006     CS-LTranv   FABRICIO   2 Term      Vag-Spont   FABRICIO   1 Term         FABRICIO          A [employer]  was used for  this visit    Patient Active Problem List   Diagnosis     Vitamin D deficiency     Previous  section     Latent tuberculosis     Esophageal reflux     Carpal tunnel syndrome during pregnancy     Anesthesia complication     S/P  section      wound infection       Current Outpatient Prescriptions   Medication Sig Dispense Refill     polyethylene glycol (MIRALAX) powder Take 17 g (1 capful) by mouth daily 510 g 1     Cholecalciferol (VITAMIN D) 2000 UNITS tablet Take 2,000 Units by mouth daily 100 tablet 3     acetaminophen (TYLENOL) 325 MG tablet Take 1-2 tablets (325-650 mg) by mouth every 4 hours as needed for mild pain or fever Reported on 5/15/2017 100 tablet 0     Prenatal Vit-Fe Fumarate-FA (PRENATAL 19) CHEW Take 1 tablet by mouth daily 90 tablet 3     neomycin-bacitracin-polymyxin (NEOSPORIN) 5-400-5000 ointment Apply topically 4 times daily 15 g 0     senna-docusate (SENOKOT-S;PERICOLACE) 8.6-50 MG per tablet Take 1-2 tablets by mouth 2 times daily (Patient not taking: Reported on 10/31/2017) 60 tablet 0     ibuprofen (ADVIL/MOTRIN) 600 MG tablet Take 1 tablet (600 mg) by mouth every 6 hours as needed for other (cramping) (Patient not taking: Reported on  10/31/2017) 60 tablet 0     senna (SENOKOT) 8.6 MG tablet Take 1 tablet by mouth daily (Patient not taking: Reported on 10/31/2017) 30 tablet 3     alum & mag hydroxide-simethicone (MYLANTA/MAALOX) 200-200-20 MG/5ML SUSP suspension Take 30 mLs by mouth every 6 hours as needed for heartburn (Patient not taking: Reported on 10/31/2017) 355 mL 0     ranitidine (ZANTAC) 300 MG tablet Take 1 tablet (300 mg) by mouth At Bedtime (Patient not taking: Reported on 10/31/2017) 30 tablet 1     Acupressure Device (SEA-BAND ADULT) BHARGAVI 1 Device as needed (Patient not taking: Reported on 10/31/2017) 1 each 0                      Delivery date: 10/19/2017      Patient had a  and repeat c/s of viable boy. She had postoperative wound infection, now completely healed.         Accompanying Signs & Symptoms:                        Breast feeding: breastfeeding going well, every 1-3 hrs, 8-12 times/24 hours   Abdominal pain: no                       Bleeding since delivery: YES- still has some spotting, always darker color, no smell          Contraception choice: IUD, needs a referral for GYN clinic        Patient has not had intercourse since delivery        Patient is due for PAP smear  Questions for Caregiver to screen for Post Partum Depression:    During the past month, have you often been bothered by feeling down, depressed, or hopeless? No  During the past month, have you often been bothered by having little interest or pleasure in doing things? No    Pospartum Depression screen:    Screen negative for Post Partum Depression.  Receiving dental care NO (Recommended)   Calcium intake is adequate     No Known Allergies         Review of Systems:   CONSTITUTIONAL: no fatigue, no unexpected change in weight  SKIN: no worrisome rashes or lesions  EYES: no acute vision problems or changes  ENT: no ear problems, no mouth problems, no throat problems  RESP: no significant cough, no shortness of breath  CV: no chest pain, no  palpitations, no new or worsening peripheral edema  : no frequency, no dysuria, no hematuria  NEURO: no weakness, no dizziness, no headaches  ENDOCRINE: no temperature intolerance, no skin/hair changes  PSYCHIATRIC: NEGATIVE for changes in mood or trouble with sleep            Physical Exam:     Vitals:    17 1123   BP: 111/71   Pulse: 65   Temp: 97.7  F (36.5  C)   TempSrc: Oral   SpO2: 100%   Weight: 198 lb 9.6 oz (90.1 kg)       GENERAL: healthy, alert, well nourished, well hydrated, no distress  HENT: ear canals- normal; TMs- normal; Nose- normal; Mouth- no ulcers, no lesions  NECK: no tenderness, no adenopathy, no asymmetry, no masses, no stiffness; thyroid- normal to palpation  RESP: lungs clear to auscultation - no rales, no rhonchi, no wheezes  CV: regular rates and rhythm, normal S1 S2, no S3 or S4 and no murmur, no click or rub -  ABDOMEN: soft, no tenderness, no  hepatosplenomegaly, no masses, normal bowel sounds, well healed wound after .   - female: vaginal bright red, with some greenish discharge. Pelvic examination with moderate vaginismus so no PAP mere performed today. Wet prep obtained.     Admission on 10/19/2017, Discharged on 10/22/2017   Component Date Value Ref Range Status     Copath Report 10/19/2017    Final                    Value:Patient Name: FESTUS DORADO  MR#: [id number]  Specimen #: [id number]  Collected: 10/19/2017  Received: 10/20/2017  Reported: 10/23/2017 14:26  Ordering Phy(s): FREDRICK CHANDRA    For improved result formatting, select 'View Enhanced Report Format'  under Linked Documents section.    SPECIMEN(S):  Placenta, 39 weeks    FINAL DIAGNOSIS:    Placenta, term, Caesarian section:       - Chorionic villi show appropriate maturation for gestational age       - Chronic villitis with patchy perivillous fibrinoid.       - Fetal membranes show no pathologic changes       - Umbilical cord shows no pathologic changes    I have personally reviewed  "all specimens and/or slides, including the  listed special stains, and used them with my medical judgement to  determine or confirm the final diagnosis.    Electronically signed out by:    Elier Garcia M.D., [place]    CLINICAL HISTORY:  37 year old  s/p uncomplicated repeatlow transverse   section. Baby was LGA, and Apgar scor                          es were 1/4/7 at 1,5, and 10  minutes, respectively. Respiratory distress at birth, resolved in the  NICU, and she was discharged.    GROSS:  Received fresh and labeled \"Placenta\" is a placenta with attached  membranes and umbilical cord.    The membranes are tan-pink, semiopaque, and incomplete, and the site of  rupture cannot be identified. The membrane insertion is marginal. The  3-vessel umbilical cord measures 17.1 cm in length and 2.0 x 1.3 cm in  cross-sectional area, shows left -handed spiraling with approximately 2  coils per 10 cm, and inserts eccentrically, 2.7 cm from the nearest  placental margin. The external surface and cut surface of the cord are  tan-white with no gross lesions. The chorionic plate vessels are  medium-caliber with a magistral configuration. Subchorionic fibrin is  minimal. The placental disc is bilobed and measures a 37.2 x 14.5 x 4.5  cm with a trimmed weight of 692.3 g. The maternal surface is complete,  with no missing cotyledons. The parenchyma is                           tan-red and spongy with no  distinct lesions.    Summary of Sections:    1 - membrane roll; fetal end of umbilical cord    2 - placental end of umbilical cord; section from near cord insertion  site to include large fetal vessels    3-4 - full-thickness, non-marginal sections (Dictated by: Ludivina Templeton  10/20/2017 03:05 PM)    MICROSCOPIC:  Sections of placental disc show appropriate maturation for gestational  age. A few clusters of chorionic villi show mild chronic inflammation  and increased cellularity in the stroma. Some of these " areas contain  patchy perivillous fibrinoid. No abnormalities of cells in the fetal or  intervillous circulation are noted. No vascular changes or inflammation  are noted in the basal plate or chorionic plate.    Sections of fetal membranes show no pathologic changes.    Sections of umbilical cord show three blood vessels and no pathologic  changes.    CPT Codes:  A: 44702-PG1    TESTING LAB LOCATION:  [facility],                           [address] [place] [id number] [phone]    COLLECTION SITE:  Client: [facility]  Location: [id number] (B)         Hemoglobin 10/20/2017 11.8  11.7 - 15.7 g/dL Final     Assessment and Plan   Marti was seen today for postpartum f/u. Doing well in overall. Still has some spotting with dark red blood.     Diagnoses and all orders for this visit:    Vaginal pain:  Vaginal spotting with 8 weeks postpartum:  - Wet Prep ([facility]): concerning for BV - vaginal Metronidazole prescribed  - Pelvic US to evaluate for possible intrauterine residuals post-CS.     Encounter for contraception counseling: patient desires cupper IUD:  - referral placed for GYN procedure clinic at [facility]  - needs to follow up pelvic US resutls prior to scheduling the appointment.   - PAP smear should be performed before placing IUD      -Tdap for pertussis prophylaxis: performed in the pregnancy  -Pap indicated but not performed due to vaginismus secondary to BV  -Contraception: Parguard IUD - referral placed for GYN procedure clinic  -Reviewed risks for post partum depression.     Options for treatment and follow-up care were reviewed with the patient and/or guardian. Marti Vale and/or guardian engaged in the decision making process and verbalized understanding of the options discussed and agreed with the final plan.    Saundra Pierre MD  [facility],  PGY-3  Family Medicine Resident  #[id number]    Addendum 12/18/17:   Pelvic US performed on 12/13/17 (this writer did not received results automatically):  IMPRESSION:  Large 5 cm complex collection in the endometrial cavity. No internal vascularity can be identified to suggest retained products of conception; ultrasound findings favor large hematoma.   - message sent to Lei Davidson and Anahi to clarify further management: referral to OB/GYN specialists versus evacuation of the hematoma in the GYN procedure clinic at [facility]     Saundra Pierre MD  PGY-3

## 2017-12-13 ENCOUNTER — HOSPITAL ENCOUNTER (OUTPATIENT)
Dept: ULTRASOUND IMAGING | Facility: CLINIC | Age: 37
Discharge: HOME | End: 2017-12-13
Attending: FAMILY MEDICINE | Admitting: FAMILY MEDICINE
Payer: COMMERCIAL

## 2017-12-13 DIAGNOSIS — N92.1 METRORRHAGIA: ICD-10-CM

## 2017-12-13 DIAGNOSIS — R10.2 VAGINAL PAIN: ICD-10-CM

## 2017-12-13 LAB — RADIOLOGIST FLAGS: NORMAL

## 2017-12-13 PROCEDURE — 76830 TRANSVAGINAL US NON-OB: CPT

## 2017-12-13 PROCEDURE — T1013 SIGN LANG/ORAL INTERPRETER: HCPCS | Mod: U3

## 2017-12-18 PROBLEM — Z98.891 S/P CESAREAN SECTION: Status: RESOLVED | Noted: 2017-10-19 | Resolved: 2017-12-18

## 2017-12-20 ENCOUNTER — TELEPHONE (OUTPATIENT)
Dept: FAMILY MEDICINE | Facility: CLINIC | Age: 37
End: 2017-12-20

## 2017-12-20 DIAGNOSIS — S37.62XA HEMATOMA OF UTERUS, INITIAL ENCOUNTER: Primary | ICD-10-CM

## 2017-12-20 NOTE — PROGRESS NOTES
Preceptor Attestation:   Patient seen and discussed with the resident. Assessment and plan reviewed with resident and agreed upon.   Supervising Physician:  Kalpesh Peters MD  [facility]

## 2017-12-20 NOTE — TELEPHONE ENCOUNTER
Called Marti and discussed pelvic US results. We will touch base with OB/GYN specialists to make a safe plan. We will call back as soon as we hear back from them.   Marti shows understanding and agrees with the plan.      Saundra Pierre MD  [facility],  PGY-3 Family Medicine Resident  #[id number]

## 2017-12-23 PROBLEM — S37.62XA HEMATOMA OF UTERUS: Status: ACTIVE | Noted: 2017-12-23

## 2017-12-23 NOTE — TELEPHONE ENCOUNTER
Called Marti to discuss our further management for the intrauterine hematoma by s/p C/s. Dr Davidson has discuss the case with Dr Duffy form [facility] OB team. We will refer Marti to the [facility] OB team (Dr. Hensley did her C/S).  They will do the evacuation in the hospital and place the IUD as well as perform PAP smear.     Marti agrees with our plan.     Saundra Pierre MD  [facility],  PGY-3 Family Medicine Resident  #[id number]

## 2018-01-09 ENCOUNTER — OFFICE VISIT (OUTPATIENT)
Dept: FAMILY MEDICINE | Facility: CLINIC | Age: 38
End: 2018-01-09
Payer: COMMERCIAL

## 2018-01-09 VITALS
TEMPERATURE: 98.2 F | SYSTOLIC BLOOD PRESSURE: 103 MMHG | OXYGEN SATURATION: 100 % | BODY MASS INDEX: 36.47 KG/M2 | HEART RATE: 75 BPM | WEIGHT: 202.6 LBS | RESPIRATION RATE: 16 BRPM | DIASTOLIC BLOOD PRESSURE: 70 MMHG

## 2018-01-09 DIAGNOSIS — Z98.891 PREVIOUS CESAREAN SECTION: ICD-10-CM

## 2018-01-09 DIAGNOSIS — S37.62XD: Primary | ICD-10-CM

## 2018-01-09 ASSESSMENT — ENCOUNTER SYMPTOMS
ACTIVITY CHANGE: 0
FATIGUE: 0
FEVER: 0

## 2018-01-09 NOTE — PROGRESS NOTES
HPI:       Marti Vale is a 38 year old who presents for the following  Patient presents with:  RECHECK: follow up on ultrasound results    Patient  Is coming back to discuss her US results. We have already discussed those on the phone and her case was discussed with OB/GYN specialists who performed . An GYN referral was placed for hematoma removal, PAP smear and cupper IUD placement. She was not called yet.     She reports that she does not have spotting anymore and does not have any abdominal pain.     No other complaints at this point.     A [facility]  was used for  this visit.      Problem, Medication and Allergy Lists were reviewed and are current.  Patient is an established patient of this clinic.         Review of Systems:   Review of Systems   Constitutional: Negative for activity change, fatigue and fever.   Genitourinary: Negative for vaginal bleeding.   All other systems reviewed and are negative.            Physical Exam:   Patient Vitals for the past 24 hrs:   BP Temp Pulse Resp SpO2 Weight   18 1518 103/70 98.2  F (36.8  C) 75 16 100 % 202 lb 9.6 oz (91.9 kg)     Body mass index is 36.47 kg/(m^2).  Vitals were reviewed and were normal     Physical Exam   Constitutional: She is oriented to person, place, and time. She appears well-developed and well-nourished. No distress.   HENT:   Head: Normocephalic and atraumatic.   Eyes: Conjunctivae are normal.   Neck: Normal range of motion. Neck supple.   Cardiovascular: Regular rhythm.    Musculoskeletal: Normal range of motion.   Neurological: She is alert and oriented to person, place, and time.   Skin: She is not diaphoretic.   Psychiatric: She has a normal mood and affect.         Results:   No pending results.   Assessment and Plan     1. Hematoma of uterus, subsequent encounter  2. Previous  section  - we discussed the case with our CC who will call the [facility] and schedule an appointment. CC will call the patient  tomorrow and inform her when her appointment will take place.     There are no discontinued medications.  Options for treatment and follow-up care were reviewed with the patient. Marti Vale  engaged in the decision making process and verbalized understanding of the options discussed and agreed with the final plan.    Saundra Pierre MD  [facility],  PGY-3 Family Medicine Resident  #[id number]

## 2018-01-09 NOTE — NURSING NOTE
name: Brielle Saw  Language: Salvadorean  Agency: [employer]  Phone number: [phone]  Najma Hoover MA

## 2018-01-09 NOTE — MR AVS SNAPSHOT
After Visit Summary   2018    Marti Vale    MRN: [id number]           Patient Information     Date Of Birth          1980        Visit Information        Provider Department      2018 3:20 PM Saundra Pierre MD [facility]        Today's Diagnoses     Hematoma of uterus, subsequent encounter    -  1    Previous  section           Follow-ups after your visit        Follow-up notes from your care team     Return if symptoms worsen or fail to improve.      Your next 10 appointments already scheduled     2018  1:45 PM CST   New Patient Visit with Amy Schumer, MD   [facility] ([facility])    [facility] [address]  [address]  [address]  [facility] [address] [phone]              Who to contact     Please call your clinic at [phone] to:    Ask questions about your health    Make or cancel appointments    Discuss your medicines    Learn about your test results    Speak to your doctor   If you have compliments or concerns about an experience at your clinic, or if you wish to file a complaint, please contact [facility] Patient Relations at [phone] or email us at [email or web address]         Additional Information About Your Visit        MyChart Information     C[employer] is an electronic gateway that provides easy, online access to your medical records. With [employer] Jetstream, you can request a clinic appointment, read your test results, renew a prescription or communicate with your care team.     To sign up for CureDMt visit the website at [email or web address]   You will be asked to enter the access code listed below, as well as some personal information. Please follow the directions to create your username and password.     Your access code is: [email or web address]  Expires: 4/15/2018  6:30 AM     Your access code will  in 90 days. If you need help  or a new code, please contact your [facility] or call [phone] for assistance.        Care EveryWhere ID     This is your Care EveryWhere ID. This could be used by other organizations to access your [facility] medical records  [id number]        Your Vitals Were     Pulse Temperature Respirations Last Period Pulse Oximetry BMI (Body Mass Index)    75 98.2  F (36.8  C) 16 01/19/2017 100% 36.47 kg/m2       Blood Pressure from Last 3 Encounters:   01/09/18 103/70   12/12/17 111/71   11/09/17 104/71    Weight from Last 3 Encounters:   01/09/18 202 lb 9.6 oz (91.9 kg)   12/12/17 198 lb 9.6 oz (90.1 kg)   11/09/17 194 lb 9.6 oz (88.3 kg)              Today, you had the following     No orders found for display       Primary Care Provider Office Phone # Fax #    Bhavna Mena [phone]       [address] [place] [address]        Equal Access to Services     [facility]: Hadii aad ku hadasho Soomaali, waaxda luqadaha, qaybta kaalmada adeegyanohemy, wesley matos . So [facility] [phone].    ATENCIÓN: Si habla español, tiene a andre disposición servicios gratuitos de asistencia lingüística. Llame al [phone].    We comply with applicable federal civil rights laws and [place] laws. We do not discriminate on the basis of race, color, national origin, age, disability, sex, sexual orientation, or gender identity.            Thank you!     Thank you for choosing [facility]  for your care. Our goal is always to provide you with excellent care. Hearing back from our patients is one way we can continue to improve our services. Please take a few minutes to complete the written survey that you may receive in the mail after your visit with us. Thank you!             Your Updated Medication List - Protect others around you: Learn how to safely use, store and throw away your medicines at www.disposemymeds.org.          This  list is accurate as of: 1/9/18 11:59 PM.  Always use your most recent med list.                   Brand Name Dispense Instructions for use Diagnosis    acetaminophen 325 MG tablet    TYLENOL    100 tablet    Take 1-2 tablets (325-650 mg) by mouth every 4 hours as needed for mild pain or fever Reported on 5/15/2017    Healthcare maintenance       alum & mag hydroxide-simethicone 200-200-20 MG/5ML Susp suspension    MYLANTA/MAALOX    355 mL    Take 30 mLs by mouth every 6 hours as needed for heartburn    Gastroesophageal reflux disease, esophagitis presence not specified       polyethylene glycol powder    MIRALAX    510 g    Take 17 g (1 capful) by mouth daily    Slow transit constipation       PRENATAL 19 Chew     90 tablet    Take 1 tablet by mouth daily    Pregnancy examination or test, positive result       ranitidine 300 MG tablet    ZANTAC    30 tablet    Take 1 tablet (300 mg) by mouth At Bedtime    Gastroesophageal reflux disease without esophagitis       SEA-BAND ADULT Belkys     1 each    1 Device as needed    Nausea       senna 8.6 MG tablet    SENOKOT    30 tablet    Take 1 tablet by mouth daily    Slow transit constipation       vitamin D 2000 UNITS tablet     100 tablet    Take 2,000 Units by mouth daily    Vitamin D deficiency

## 2018-01-09 NOTE — PROGRESS NOTES
Preceptor Attestation:   Patient seen and discussed with the resident. Assessment and plan reviewed with resident and agreed upon.   Supervising Physician:  Bhavna Calles MD  [facility]

## 2018-01-12 NOTE — TELEPHONE ENCOUNTER
[facility] on 1/[facility] @ 1:45p.m with Dr.Schumer. Contacted  Brielle ([phone]) and she was given all appointment information. Brielle will contact patient to inform.    Masha Olivarez  Care Coordinator

## 2018-04-27 ENCOUNTER — OFFICE VISIT (OUTPATIENT)
Dept: OBGYN | Facility: CLINIC | Age: 38
End: 2018-04-27
Attending: OBSTETRICS & GYNECOLOGY
Payer: COMMERCIAL

## 2018-04-27 VITALS
SYSTOLIC BLOOD PRESSURE: 107 MMHG | DIASTOLIC BLOOD PRESSURE: 67 MMHG | WEIGHT: 199 LBS | BODY MASS INDEX: 35.82 KG/M2 | HEART RATE: 64 BPM

## 2018-04-27 DIAGNOSIS — S37.62XD: ICD-10-CM

## 2018-04-27 DIAGNOSIS — S37.62XD: Primary | ICD-10-CM

## 2018-04-27 PROCEDURE — G0463 HOSPITAL OUTPT CLINIC VISIT: HCPCS | Mod: 25,ZF

## 2018-04-27 ASSESSMENT — PAIN SCALES - GENERAL: PAINLEVEL: NO PAIN (0)

## 2018-04-27 NOTE — MR AVS SNAPSHOT
After Visit Summary   2018    Marti Vale    MRN: [id number]           Patient Information     Date Of Birth          1980        Visit Information        Provider Department      2018 3:15 PM Landy Valnecia MD [facility]        Today's Diagnoses     Hematoma of uterus, subsequent encounter    -  1       Follow-ups after your visit        Follow-up notes from your care team     Return if symptoms worsen or fail to improve.      Your next 10 appointments already scheduled     May 14, 2018  3:45 PM CDT   Return Visit with Landy Valencia MD   [facility] ([facility])    [facility] [address]  [address]  [address]  [facility] [address] [phone]              Who to contact     Please call your clinic at [phone] to:    Ask questions about your health    Make or cancel appointments    Discuss your medicines    Learn about your test results    Speak to your doctor            Additional Information About Your Visit        MyChart Information     [employer] [facility] is an electronic gateway that provides easy, online access to your medical records. With [facility] [employer] [facility], you can request a clinic appointment, read your test results, renew a prescription or communicate with your care team.     To sign up for VocalIQt visit the website at [email or web address]   You will be asked to enter the access code listed below, as well as some personal information. Please follow the directions to create your username and password.     Your access code is: [email or web address]  Expires: 2018  6:30 AM     Your access code will  in 90 days. If you need help or a new code, please contact your [facility] or call [phone] for assistance.        Care EveryWhere ID     This is your Care EveryWhere ID. This could be used by other organizations to access your [facility]  records  [id number]        Your Vitals Were     Pulse Breastfeeding? BMI (Body Mass Index)             64 Yes 35.82 kg/m2          Blood Pressure from Last 3 Encounters:   04/27/18 107/67   01/09/18 103/70   12/12/17 111/71    Weight from Last 3 Encounters:   04/27/18 90.3 kg (199 lb)   01/09/18 91.9 kg (202 lb 9.6 oz)   12/12/17 90.1 kg (198 lb 9.6 oz)                 Today's Medication Changes          These changes are accurate as of 4/27/18  4:43 PM.  If you have any questions, ask your nurse or doctor.               Stop taking these medicines if you haven't already. Please contact your care team if you have questions.     alum & mag hydroxide-simethicone 200-200-20 MG/5ML Susp suspension   Commonly known as:  MYLANTA/MAALOX   Stopped by:  Landy Valencia MD           ranitidine 300 MG tablet   Commonly known as:  ZANTAC   Stopped by:  Landy Valencia MD           SEA-BAND ADULT Belkys   Stopped by:  Landy Valencia MD           senna 8.6 MG tablet   Commonly known as:  SENOKOT   Stopped by:  Landy Valencia MD                    Primary Care Provider Office Phone # Fax #    Bhavna Milana Mena [phone]       [address] [facility] [address]        Equal Access to Services     [facility] AH: Hadii aad ku hadasho Soomaali, waaxda luqadaha, qaybta kaalmada rashmi, wesley matos . So [facility] [phone].    ATENCIÓN: Si habla español, tiene a andre disposición servicios gratuitos de asistencia lingüística. Llame al [phone].    We comply with applicable federal civil rights laws and [place] laws. We do not discriminate on the basis of race, color, national origin, age, disability, sex, sexual orientation, or gender identity.            Thank you!     Thank you for choosing [facility]  for your care. Our goal is always to provide you with excellent care. Hearing back from our patients is one way we can  continue to improve our services. Please take a few minutes to complete the written survey that you may receive in the mail after your visit with us. Thank you!             Your Updated Medication List - Protect others around you: Learn how to safely use, store and throw away your medicines at www.disposemymeds.org.          This list is accurate as of 4/27/18  4:43 PM.  Always use your most recent med list.                   Brand Name Dispense Instructions for use Diagnosis    acetaminophen 325 MG tablet    TYLENOL    100 tablet    Take 1-2 tablets (325-650 mg) by mouth every 4 hours as needed for mild pain or fever Reported on 5/15/2017    Healthcare maintenance       polyethylene glycol powder    MIRALAX    510 g    Take 17 g (1 capful) by mouth daily    Slow transit constipation       PRENATAL 19 Chew     90 tablet    Take 1 tablet by mouth daily    Pregnancy examination or test, positive result       vitamin D 2000 units tablet     100 tablet    Take 2,000 Units by mouth daily    Vitamin D deficiency

## 2018-04-27 NOTE — LETTER
4/27/2018       RE: Marti Vale  [address] [facility] [address]     Dear Colleague,    Thank you for referring your patient, Marti Vale, to the [facility] at [facility]. Please see a copy of my visit note below.    38 year old female presents for gynecologic ultrasound indicated by hematoma of the uterus.  This study was done transvaginally.    Uterine findings:   Presence: Visible Size: Normal 5.1 x 5.6 x 3.6 cm.  Endometrium = 4.4 mm.   Cx length = 26.5 mm.      Flexion:  Anteverted Position: Midline Margins: Smooth Shape: Normal   Contour: Regular Texture: Homogeneous Cavity: Normal Masses: Abnormal- hematoma: 1.3 x 1.2 x 0.8cm, anterior left.    Pelvic findings:    Right Adnexa: Normal   Left Adnexa: Normal   Bladder:  Normal         Cul - de - sac fluid: None    Ovarian follicles:   Right ovary:  3.0 x 2.4 x 1.5cm.     0 follicles     Left ovary:  2.5 x 2.9 x 1.3cm.     0 follicles    Comments:  KUNAL fluid collection, reduced in size since last ultrasound.  Consider D&C, possible hysteroscopy if symptomatic.    NILA Dang MD MPH

## 2018-04-27 NOTE — LETTER
4/27/2018       RE: Marti Vale  [address] [facility] [address] [facility] [address]     Dear Colleague,    Thank you for referring your patient, Marti Vale, to the [facility] at [facility]. Please see a copy of my visit note below.    S: pt here for f/u of uterine fluid collection s/p c/s in 10/17.    Denies d/c or bleeding. She is breast feeding. She has no pain. She is using BF and condoms for BC.    Desires IUD at some point.  Does not want future pregnancy.   .    Vitals:    04/27/18 1548   BP: 107/67   Pulse: 64   Weight: 90.3 kg (199 lb)     38 year old female presents for gynecologic ultrasound indicated by hematoma of the uterus.  This study was done transvaginally.     Uterine findings:                        Presence: Visible Size: Normal 5.1 x 5.6 x 3.6 cm.  Endometrium = 4.4 mm.                        Cx length = 26.5 mm.                            Flexion:  Anteverted              Position: Midline                    Margins: Smooth                   Shape: Normal                        Contour: Regular                  Texture: Homogeneous                    Cavity: Normal                      Masses: Abnormal- hematoma: 1.3 x 1.2 x 0.8cm, anterior left.     Pelvic findings:                         Right Adnexa: Normal                        Left Adnexa: Normal                        Bladder:  Normal                                                                                                             Cul - de - sac fluid: None     Ovarian follicles:                        Right ovary:  3.0 x 2.4 x 1.5cm.                           0 follicles                           Left ovary:  2.5 x 2.9 x 1.3cm.                           0 follicles     Comments:  KUNAL fluid collection, reduced in size since last ultrasound.  Consider D&C, possible hysteroscopy if symptomatic.     NILA Dang MD  MP[facility]      A/p: Asymptomatic small KUNAL fluid collection, decreased in size since last eval.      Once menses resumes if issues w/ menometrorraghia, consider hysteroscopy.      If pregnancy, early us to confirm not in c/s scar placentation    Offered IUD, pt declines today.    Landy Valencia MD, FACOG  [facility]  OB/GYN    4/27/2018  4:43 PM    TT spent 15 min, all counseling

## 2018-04-27 NOTE — MR AVS SNAPSHOT
After Visit Summary   2018    Marti Vale    MRN: [id number]           Patient Information     Date Of Birth          1980        Visit Information        Provider Department      2018 4:00 PM [facility] ULTRASOUND [facility]        Today's Diagnoses     Hematoma of uterus, subsequent encounter           Follow-ups after your visit        Your next 10 appointments already scheduled     May 14, 2018  3:45 PM CDT   Return Visit with Landy Valencia MD   [facility] ([facility])    [facility] [address]  [address]  [address]  [facility] [address] [phone]              Who to contact     Please call your clinic at [phone] to:    Ask questions about your health    Make or cancel appointments    Discuss your medicines    Learn about your test results    Speak to your doctor            Additional Information About Your Visit        MyChart Information     [employer] is an electronic gateway that provides easy, online access to your medical records. With HItviews, you can request a clinic appointment, read your test results, renew a prescription or communicate with your care team.     To sign up for [employer] visit the website at [email or web address]   You will be asked to enter the access code listed below, as well as some personal information. Please follow the directions to create your username and password.     Your access code is: [email or web address]  Expires: 2018  6:30 AM     Your access code will  in 90 days. If you need help or a new code, please contact your [facility] or call [phone] for assistance.        Care EveryWhere ID     This is your Care EveryWhere ID. This could be used by other organizations to access your [facility] medical records  [id number]         Blood Pressure from Last 3 Encounters:   18 107/67   18 103/70   17  111/71    Weight from Last 3 Encounters:   04/27/18 90.3 kg (199 lb)   01/09/18 91.9 kg (202 lb 9.6 oz)   12/12/17 90.1 kg (198 lb 9.6 oz)              We Performed the Following     US Pelvic Complete with Transvaginal          Today's Medication Changes          These changes are accurate as of 4/27/18  4:33 PM.  If you have any questions, ask your nurse or doctor.               Stop taking these medicines if you haven't already. Please contact your care team if you have questions.     alum & mag hydroxide-simethicone 200-200-20 MG/5ML Susp suspension   Commonly known as:  MYLANTA/MAALOX   Stopped by:  Landy Valencia MD           ranitidine 300 MG tablet   Commonly known as:  ZANTAC   Stopped by:  Landy Valencia MD           SEA-BAND ADULT Belkys   Stopped by:  Landy Valencia MD           senna 8.6 MG tablet   Commonly known as:  SENOKOT   Stopped by:  Landy Valencia MD                    Primary Care Provider Office Phone # Fax #    Bhavna Milana Mena [phone]       [address] [facility] [address]        Equal Access to Services     [facility]: Hadii ehsan ferris hadasho Soomaali, waaxda luqadaha, qaybta kaalmada adeegyada, wesley antoine. So [facility] [phone].    ATENCIÓN: Si habla español, tiene a andre disposición servicios gratuitos de asistencia lingüística. Llame al [phone].    We comply with applicable federal civil rights laws and [place] laws. We do not discriminate on the basis of race, color, national origin, age, disability, sex, sexual orientation, or gender identity.            Thank you!     Thank you for choosing [facility]  for your care. Our goal is always to provide you with excellent care. Hearing back from our patients is one way we can continue to improve our services. Please take a few minutes to complete the written survey that you may receive in the mail after your visit with  us. Thank you!             Your Updated Medication List - Protect others around you: Learn how to safely use, store and throw away your medicines at www.disposemymeds.org.          This list is accurate as of 4/27/18  4:33 PM.  Always use your most recent med list.                   Brand Name Dispense Instructions for use Diagnosis    acetaminophen 325 MG tablet    TYLENOL    100 tablet    Take 1-2 tablets (325-650 mg) by mouth every 4 hours as needed for mild pain or fever Reported on 5/15/2017    Healthcare maintenance       polyethylene glycol powder    MIRALAX    510 g    Take 17 g (1 capful) by mouth daily    Slow transit constipation       PRENATAL 19 Chew     90 tablet    Take 1 tablet by mouth daily    Pregnancy examination or test, positive result       vitamin D 2000 units tablet     100 tablet    Take 2,000 Units by mouth daily    Vitamin D deficiency

## 2018-04-27 NOTE — PROGRESS NOTES
38 year old female presents for gynecologic ultrasound indicated by hematoma of the uterus.  This study was done transvaginally.    Uterine findings:   Presence: Visible Size: Normal 5.1 x 5.6 x 3.6 cm.  Endometrium = 4.4 mm.   Cx length = 26.5 mm.      Flexion:  Anteverted Position: Midline Margins: Smooth Shape: Normal   Contour: Regular Texture: Homogeneous Cavity: Normal Masses: Abnormal- hematoma: 1.3 x 1.2 x 0.8cm, anterior left.    Pelvic findings:    Right Adnexa: Normal   Left Adnexa: Normal   Bladder:  Normal         Cul - de - sac fluid: None    Ovarian follicles:   Right ovary:  3.0 x 2.4 x 1.5cm.     0 follicles     Left ovary:  2.5 x 2.9 x 1.3cm.     0 follicles    Comments:  KUNAL fluid collection, reduced in size since last ultrasound.  Consider D&C, possible hysteroscopy if symptomatic.    NILA Dang MD MPH

## 2018-04-27 NOTE — NURSING NOTE
Chief Complaint   Patient presents with     Establish Care     Hematoma of uterus , follow-up pelvic ULS   Evangelina Jonas LPN

## 2018-04-27 NOTE — PROGRESS NOTES
S: pt here for f/u of uterine fluid collection s/p c/s in 10/17.    Denies d/c or bleeding. She is breast feeding. She has no pain. She is using BF and condoms for BC.    Desires IUD at some point.  Does not want future pregnancy.   .    Vitals:    04/27/18 1548   BP: 107/67   Pulse: 64   Weight: 90.3 kg (199 lb)     38 year old female presents for gynecologic ultrasound indicated by hematoma of the uterus.  This study was done transvaginally.     Uterine findings:                        Presence: Visible Size: Normal 5.1 x 5.6 x 3.6 cm.  Endometrium = 4.4 mm.                        Cx length = 26.5 mm.                            Flexion:  Anteverted              Position: Midline                    Margins: Smooth                   Shape: Normal                        Contour: Regular                  Texture: Homogeneous                    Cavity: Normal                      Masses: Abnormal- hematoma: 1.3 x 1.2 x 0.8cm, anterior left.     Pelvic findings:                         Right Adnexa: Normal                        Left Adnexa: Normal                        Bladder:  Normal                                                                                                             Cul - de - sac fluid: None     Ovarian follicles:                        Right ovary:  3.0 x 2.4 x 1.5cm.                           0 follicles                           Left ovary:  2.5 x 2.9 x 1.3cm.                           0 follicles     Comments:  KUNAL fluid collection, reduced in size since last ultrasound.  Consider D&C, possible hysteroscopy if symptomatic.     NILA Dang MD MPH      A/p: Asymptomatic small KUNAL fluid collection, decreased in size since last eval.      Once menses resumes if issues w/ menometrorraghia, consider hysteroscopy.      If pregnancy, early us to confirm not in c/s scar placentation    Offered IUD, pt declines today.    Landy Valencia MD, FACOG  [facility]  OB/GYN    4/27/2018  4:43 PM    TT spent 15 min, all counseling

## 2018-05-18 DIAGNOSIS — Z00.00 HEALTHCARE MAINTENANCE: ICD-10-CM

## 2018-05-19 NOTE — TELEPHONE ENCOUNTER
"Requested Prescriptions   Pending Prescriptions Disp Refills     MAPAP 325 MG tablet [Pharmacy Med Name: ACETAMINOPHEN 325MG TAB]  Last Written Prescription Date:  11/2/17  Last Fill Quantity: 100,  # refills: 0   Last office visit: No previous visit found with prescribing provider:     Future Office Visit:   100 tablet 0     Sig: TAKE ONE TO TWO TABLETS BY MOUTH EVERY 4 HOURS AS NEEDED FOR MILD PAIN OR FEVER    Analgesics (Non-Narcotic Tylenol and ASA Only) Failed    5/18/2018  4:49 PM       Failed - Recent (12 mo) or future (30 days) visit within the authorizing provider's specialty    Patient had office visit in the last 12 months or has a visit in the next 30 days with authorizing provider or within the authorizing provider's specialty.  See \"Patient Info\" tab in inbasket, or \"Choose Columns\" in Meds & Orders section of the refill encounter.           Passed - Patient is 7 months old or older    If patient is a peds patient of the age 7 mos -12 years, ok to refill using weight-based dosing.     If >3g daily and/or sig is not \"prn\", check for liver enzymes. If normal in the last year, ok to refill.  If not, refer to the provider.            "

## 2018-05-21 DIAGNOSIS — Z32.01 PREGNANCY EXAMINATION OR TEST, POSITIVE RESULT: ICD-10-CM

## 2018-05-21 RX ORDER — ACETAMINOPHEN 325 MG/1
TABLET ORAL
Qty: 0.01 TABLET | Refills: 0 | OUTPATIENT
Start: 2018-05-21

## 2018-05-21 RX ORDER — PNV NO.118/IRON FUMARATE/FA 29 MG-1 MG
1 TABLET,CHEWABLE ORAL DAILY
Qty: 90 TABLET | Refills: 3 | Status: SHIPPED | OUTPATIENT
Start: 2018-05-21 | End: 2020-03-06

## 2018-05-21 NOTE — TELEPHONE ENCOUNTER
We have not seen patient in our clinic    Refused Prescriptions:                       Disp   Refills    MAPAP 325 MG tablet [Pharmacy Med Name: AC*0.01 t*0        Sig: TAKE ONE TO TWO TABLETS BY MOUTH EVERY 4 HOURS AS           NEEDED FOR MILD PAIN OR FEVER  Refused By: YAKOV THOMPSON  Reason for Refusal: Refill not appropriate      Closing encounter - no further actions needed at this time    Yakov Thompson RN

## 2018-05-30 ENCOUNTER — OFFICE VISIT (OUTPATIENT)
Dept: FAMILY MEDICINE | Facility: CLINIC | Age: 38
End: 2018-05-30
Payer: COMMERCIAL

## 2018-05-30 VITALS
SYSTOLIC BLOOD PRESSURE: 111 MMHG | OXYGEN SATURATION: 98 % | DIASTOLIC BLOOD PRESSURE: 74 MMHG | TEMPERATURE: 97.9 F | RESPIRATION RATE: 16 BRPM | WEIGHT: 197 LBS | BODY MASS INDEX: 35.46 KG/M2 | HEART RATE: 66 BPM

## 2018-05-30 DIAGNOSIS — M65.4 DE QUERVAIN'S TENOSYNOVITIS, LEFT: ICD-10-CM

## 2018-05-30 DIAGNOSIS — G57.12 MERALGIA PARESTHETICA OF LEFT SIDE: Primary | ICD-10-CM

## 2018-05-30 PROBLEM — O26.899 CARPAL TUNNEL SYNDROME DURING PREGNANCY: Status: RESOLVED | Noted: 2017-07-11 | Resolved: 2018-05-30

## 2018-05-30 PROBLEM — G56.00 CARPAL TUNNEL SYNDROME DURING PREGNANCY: Status: RESOLVED | Noted: 2017-07-11 | Resolved: 2018-05-30

## 2018-05-30 RX ORDER — IBUPROFEN 600 MG/1
600 TABLET, FILM COATED ORAL EVERY 8 HOURS PRN
Qty: 60 TABLET | Refills: 0 | Status: SHIPPED | OUTPATIENT
Start: 2018-05-30 | End: 2020-03-06

## 2018-05-30 NOTE — PATIENT INSTRUCTIONS
Here is the plan from today's visit    1. Meralgia paresthetica of left side  Use the ibuprofen three times a day for the next 7-10 days   - ibuprofen (ADVIL/MOTRIN) 600 MG tablet; Take 1 tablet (600 mg) by mouth every 8 hours as needed for moderate pain  Dispense: 60 tablet; Refill: 0    2. De Quervain's tenosynovitis, left  Use the ibuprofen three times a day for the next 7-10 days   Wear the splint at work for the next 2-3 weeks   - THUMB SPICA SPLINT  - ibuprofen (ADVIL/MOTRIN) 600 MG tablet; Take 1 tablet (600 mg) by mouth every 8 hours as needed for moderate pain  Dispense: 60 tablet; Refill: 0    Please call or return to clinic if your symptoms don't go away.    Thank you for coming to [facility] today.  Lab Testing:  **If you had lab testing today and your results are reassuring or normal they will be mailed to you or sent through [employer] within 7 days.   **If the lab tests need quick action we will call you with the results.  The phone number we will call with results is # [phone] (home) . If this is not the best number please call our clinic and change the number.  Medication Refills:  If you need any refills please call your pharmacy and they will contact us.   If you need to  your refill at a new pharmacy, please contact the new pharmacy directly. The new pharmacy will help you get your medications transferred faster.   Scheduling:  If you have any concerns about today's visit or wish to schedule another appointment please call our office during normal business hours [phone] (8-5:00 M-F)  If a referral was made to a [facility] and you don't get a call from central scheduling please call [phone].  If a Mammogram was ordered for you at [facility] call [phone] to schedule or change your appointment.  If you had an XRay/CT/Ultrasound/MRI ordered the number is [phone] to schedule or change your radiology appointment.   Medical Concerns:  If  you have urgent medical concerns please call [phone] at any time of the day.    Andrew Madrid MD

## 2018-05-30 NOTE — NURSING NOTE
Due to patient being non-English speaking/uses sign language, an  was used for this visit. Only for face-to-face interpretation by an external agency, date and length of interpretation can be found on the scanned worksheet.     name: Brielle Spring  Agency: [employer]  Language: Iranian   Telephone number: [phone]  Type of interpretation: Face-to-face, spoken    Joya Klein CMA

## 2019-02-06 NOTE — PROGRESS NOTES
HPI:       Marti Vale is a 38 year old who presents for the following  Patient presents with:  Musculoskeletal Problem: left hip numbness x's 5 days  Refill Request: prenatal vitamin      Joint/Muscle Pain      Onset: 5 days ago    Injury?  no    Description:   Location(s): Left hip  Character: stiffness, numbness of the hip. It radiates down the leg to the ankle    Intensity: 8-9/10 when walking    Progression of Symptoms: same    Accompanying Signs & Symptoms:  Other symptoms: radiation of pain to ankle, numbness and weakness of hip    History:   Previous similar pain: no      Worsened by    Overuse?: Yes Details: prolonged sitting causes more pain  Morning Stiffness?:Yes     Alleviating factors:  Improved by: nothing  Therapies Tried and outcome: Nothing       Joint/Muscle Pain      Onset:  Approximately 1 year - started during pregnancy    Injury?  no    Description:   Location(s):  Left wrist and thumb  Character: Dull ache    Intensity: mild    Progression of Symptoms:  Better but then returned     Accompanying Signs & Symptoms:  Other symptoms: none    History:   Previous similar pain: Yes Details: during pregnancy      Worsened by    Overuse?: Yes Details: works as a   Morning Stiffness?:no    Alleviating factors:  Improved by: rest/inactivity  Therapies Tried and outcome: Nothing    A [email or web address]  was used for  this visit.     Problem, Medication and Allergy Lists were reviewed and are current.  Patient is an established patient of this clinic.         Review of Systems:   Review of Systems          Physical Exam:   Patient Vitals for the past 24 hrs:   BP Temp Temp src Pulse Resp SpO2 Weight   05/30/18 0931 111/74 97.9  F (36.6  C) Oral 66 16 98 % 197 lb (89.4 kg)     Body mass index is 35.46 kg/(m^2).  Vitals were reviewed and were normal     Physical Exam   Constitutional: No distress.   obese   Musculoskeletal:   LEFT HIP  Palpation: Tender:   Anterior at sight of femoral  Patient's daughter received a phone call from her sister, who received a phone call from a niece that was with the patient. She is unsure of what machine is going off or if the patient is symptomatic. She is asking the device clinic to call Elvira Lock who will be with the patient within 2-3 minutes. nerve  Non-tender:  remainder  Range of Motion:  Full ROM, both hips  Strength:  full strength  Special tests:  no crepitation/snapping over central inguinal region, no crepitation/snapping over greater trochanter, no IT band tightness, no rectus femoris tightness, BAMBI negative  LEFT WRIST:  Inspection: no swelling, no effusion, no deformity  Palpation: Tender: none  Non-tender: all  Range of Motion: normal  Strength: no deficits  Special tests: negative Tinel's at carpal tunnel.  , negative Phalen's, positive Finkelstein's.   Psychiatric: She has a normal mood and affect.   Nursing note and vitals reviewed.      Assessment and Plan     1. Meralgia paresthetica of left side  Discussed natural history how this is related to obesity.  May get some improvement with breaks from sitting and trial of ibuprofen   - ibuprofen (ADVIL/MOTRIN) 600 MG tablet; Take 1 tablet (600 mg) by mouth every 8 hours as needed for moderate pain  Dispense: 60 tablet; Refill: 0    2. De Quervain's tenosynovitis, left  - trial of ibuprofen (ADVIL/MOTRIN) 600 MG tablet; Take 1 tablet (600 mg) by mouth every 8 hours as needed for moderate pain  Dispense: 60 tablet; Refill: 0 for 10 days  - UNIVERSAL WRIST SPLINTS WITH THUMB on the left.  Wear while at work.  Patient requested letter for time off which was denied by this MD.  Discussed that if not resolving with conservative options, may need to consider injection with sports medicine    25 min spent in direct face to face time with this patient, greater than 50% in counseling regarding above.    Options for treatment and follow-up care were reviewed with the patient. Marti Vale  engaged in the decision making process and verbalized understanding of the options discussed and agreed with the final plan.    Andrew Madrid MD

## 2020-03-06 ENCOUNTER — OFFICE VISIT (OUTPATIENT)
Dept: FAMILY MEDICINE | Facility: CLINIC | Age: 40
End: 2020-03-06
Payer: COMMERCIAL

## 2020-03-06 VITALS
BODY MASS INDEX: 34.52 KG/M2 | RESPIRATION RATE: 16 BRPM | SYSTOLIC BLOOD PRESSURE: 139 MMHG | WEIGHT: 194.8 LBS | TEMPERATURE: 98.4 F | DIASTOLIC BLOOD PRESSURE: 79 MMHG | HEIGHT: 63 IN | HEART RATE: 74 BPM | OXYGEN SATURATION: 99 %

## 2020-03-06 DIAGNOSIS — Z00.00 ROUTINE GENERAL MEDICAL EXAMINATION AT A HEALTH CARE FACILITY: Primary | ICD-10-CM

## 2020-03-06 DIAGNOSIS — E55.9 VITAMIN D DEFICIENCY: ICD-10-CM

## 2020-03-06 LAB — HEMOGLOBIN: 12.9 G/DL (ref 11.7–15.7)

## 2020-03-06 RX ORDER — MULTIVITAMIN,THERAPEUTIC
1 TABLET ORAL DAILY
COMMUNITY

## 2020-03-06 ASSESSMENT — MIFFLIN-ST. JEOR: SCORE: 1512.74

## 2020-03-06 NOTE — LETTER
"March 10, 2020      Marti Vale  [address] [address]        Dear Marti,    Thank you for getting your care at [facility]. Please see below for your test results.    Resulted Orders   Vitamin D Level   Result Value Ref Range    Vitamin D Deficiency screening 26 20 - 75 ug/L      Comment:      Season, race, dietary intake, and treatment affect the concentration of   25-hydroxy-Vitamin D. Values may decrease during winter months and increase   during summer months. Values 20-29 ug/L may indicate Vitamin D insufficiency   and values <20 ug/L may indicate Vitamin D deficiency.  Vitamin D determination is routinely performed by an immunoassay specific for   25 hydroxyvitamin D3.  If an individual is on vitamin D2 (ergocalciferol)   supplementation, please specify 25 OH vitamin D2 and D3 level determination by   LCMSMS test VITD23.     Hemoglobin (HGB) ([facility])   Result Value Ref Range    Hemoglobin 12.9 11.7 - 15.7 g/dL       {[facility] FOLLOW UP LTR:[id number]::\"If you have any concerns about these results please call and leave a message for me or send a MyChart message to the clinic.\"}    Sincerely,    Charlotte Amado, DO    "

## 2020-03-06 NOTE — PROGRESS NOTES
Preceptor Attestation:   Patient seen, evaluated and discussed with the resident. I have verified the content of the note, which accurately reflects my assessment of the patient and the plan of care.   Supervising Physician:  Sienna Umana MD.

## 2020-03-06 NOTE — PROGRESS NOTES
Female Physical Note          HPI         Concerns today: No special concerns today.  A [employer]  was used for  this visit.     Patient Active Problem List   Diagnosis     Vitamin D deficiency     Previous  section     Latent tuberculosis     Esophageal reflux     Anesthesia complication     Hematoma of uterus by status post c/s     Past Medical History:   Diagnosis Date     Anemia      Previous Medical Care   Only here at \[facility]", last seen May 2018      Family History   Problem Relation Age of Onset     Diabetes No family hx of      Coronary Artery Disease No family hx of      Hypertension No family hx of      Hyperlipidemia No family hx of      Cerebrovascular Disease No family hx of      Breast Cancer No family hx of      Colon Cancer No family hx of      Prostate Cancer No family hx of      Other Cancer No family hx of      Depression No family hx of      Anxiety Disorder No family hx of      Mental Illness No family hx of      Crohn's Disease No family hx of      Ulcerative Colitis No family hx of            Review of Systems:   CONSTITUTIONAL: NEGATIVE for fever, chills, change in weight  INTEGUMENTARY/SKIN: NEGATIVE for worrisome rashes, moles or lesions  EYES: NEGATIVE for vision changes or irritation  ENT/MOUTH: NEGATIVE for ear, mouth and throat problems  RESP: NEGATIVE for significant cough or SOB  BREAST: NEGATIVE for masses, tenderness or discharge  CV: NEGATIVE for chest pain, palpitations or peripheral edema  GI: NEGATIVE for nausea, abdominal pain, or change in bowel habits, POSITIVE for heartburn  : NEGATIVE for frequency, dysuria, or hematuria  MUSCULOSKELETAL: NEGATIVE for significant arthralgias or myalgia, POSITIVE for bilateral wrist pain (diagnosed with tenosynovitis May 2018 - sometimes using splint 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 No  Do you feel sleepy or extremely tired during most of the day? No         Social History     Social History     Socioeconomic History     Marital status:      Spouse name: Not on file     Number of children: Not on file     Years of education: Not on file     Highest education level: Not on file   Occupational History     Not on file   Social Needs     Financial resource strain: Not on file     Food insecurity:     Worry: Not on file     Inability: Not on file     Transportation needs:     Medical: Not on file     Non-medical: Not on file   Tobacco Use     Smoking status: Never Smoker     Smokeless tobacco: Never Used   Substance and Sexual Activity     Alcohol use: No     Drug use: No     Sexual activity: Yes     Partners: Male     Birth control/protection: I.U.D.   Lifestyle     Physical activity:     Days per week: Not on file     Minutes per session: Not on file     Stress: Not on file   Relationships     Social connections:     Talks on phone: Not on file     Gets together: Not on file     Attends Rastafarian service: Not on file     Active member of club or organization: Not on file     Attends meetings of clubs or organizations: Not on file     Relationship status: Not on file     Intimate partner violence:     Fear of current or ex partner: Not on file     Emotionally abused: Not on file     Physically abused: Not on file     Forced sexual activity: Not on file   Other Topics Concern     Not on file   Social History Narrative     Not on file     Marital Status:  Who lives in your household? , 6 kids (23, 19, 13, 11, 10, 2)    Has anyone hurt you physically, for example by pushing, hitting, slapping or kicking you or forcing you to have sex? Denies  Do you feel  "threatened or controlled by a partner, ex-partner or anyone in your life? Denies    Sexual Health     Sexual concerns: Yes   STI History: Neg  Pregnancy History:   LMP Patient's last menstrual period was 2020 (exact date). Comes every month  Last Pap Smear Date: PAP smear done 2017 during pregnancy - patient reports having it done here but no record of (chart review shows discussions, but never done, had recommended 6wk post partum IUD placement with pap, but patient never had done)  Abnormal Pap History: None    Recommended Screening     Pap/HPV cotest every 5 years for women 30-65   Recommended and patient declined testing.  Breast CA Screening (>39 yo or 10 y before 1st degree relative diagnosis): we discussed different timing based on different expert groups and patient declines today. She does self breast exams and does not feel any abnormalities. Would like to have breast exam today.          Physical Exam:     Vitals: /79 (BP Location: Left arm, Patient Position: Sitting, Cuff Size: Adult Regular)   Pulse 74   Temp 98.4  F (36.9  C) (Oral)   Resp 16   Ht 1.6 m (5' 3\")   Wt 88.4 kg (194 lb 12.8 oz)   LMP 2020 (Exact Date)   SpO2 99%   BMI 34.51 kg/m    BMI= Body mass index is 34.51 kg/m .   GENERAL: healthy, alert and no distress  EYES: Eyes grossly normal to inspection, extraocular movements - intact, and PERRL  HENT: ear canals- normal; TMs- normal; Nose- normal; Mouth- no ulcers, no lesions  NECK: no tenderness, no adenopathy, no asymmetry, no masses, no stiffness; thyroid- normal to palpation  RESP: lungs clear to auscultation - no rales, no rhonchi, no wheezes  BREAST: no masses, no tenderness, no nipple discharge, no palpable axillary masses or adenopathy  CV: regular rates and rhythm, normal S1 S2, no S3 or S4 and no murmur, no click or rub -  ABDOMEN: soft, no tenderness, no  hepatosplenomegaly, no masses, normal bowel sounds  MS: extremities- no gross deformities " noted, no edema  SKIN: no suspicious lesions, no rashes  NEURO: strength and tone- normal, sensory exam- grossly normal, mentation- intact, speech- normal  BACK: no CVA tenderness, no paralumbar tenderness  - female: Declined  RECTAL- Declined  PSYCH: Alert and oriented times 3; speech- coherent , normal rate and volume; able to articulate logical thoughts, able to abstract reason, no tangential thoughts, no hallucinations or delusions, affect- normal  LYMPHATICS: ant. cervical- normal, post. cervical- normal, axillary- normal    Assessment and Plan      Marti was seen today for physical.    Diagnoses and all orders for this visit:    Routine general medical examination at a health care facility  -     Hemoglobin (HGB) ([facility])    Vitamin D deficiency  -     Vitamin D Level      1. Patient declined breast cancer screening and Pap smear -explained to patient risks and benefits of Pap smear, patient has never had one done per chart review.  Patient expressed understanding of this and declined.  2.Contraception: declines  3. Vitamin D deficiency - takes 1000 to 2000u OTC vitamin D. Last Vitamin D check was 9/20/2017 and was 35, after replacement for previous level of 14 on 3/21/2017. If Vitamin D today is low, will recommend increasing current dosing. If it is in acceptable range and not too high, continue with current OTC 1000u-2000u.  4.  Follow-up in 1 year for routine physical exam if vitamin D and hemoglobin are within normal limits.    Options for treatment and follow-up care were reviewed with the patient . Marti Vale and/or guardian engaged in the decision making process and verbalized understanding of the options discussed and agreed with the final plan.    Charlotte Amado, DO

## 2020-03-06 NOTE — LETTER
March 10, 2020      Marti Vale  [address] [address]        Dear Marti,    Thank you for getting your care at [facility]. Please see below for your test results.    Resulted Orders   Vitamin D Level   Result Value Ref Range    Vitamin D Deficiency screening 26 20 - 75 ug/L      Comment:      Season, race, dietary intake, and treatment affect the concentration of   25-hydroxy-Vitamin D. Values may decrease during winter months and increase   during summer months. Values 20-29 ug/L may indicate Vitamin D insufficiency   and values <20 ug/L may indicate Vitamin D deficiency.  Vitamin D determination is routinely performed by an immunoassay specific for   25 hydroxyvitamin D3.  If an individual is on vitamin D2 (ergocalciferol)   supplementation, please specify 25 OH vitamin D2 and D3 level determination by   LCMSMS test VITD23.     Hemoglobin (HGB) ([facility])   Result Value Ref Range    Hemoglobin 12.9 11.7 - 15.7 g/dL       If you have any concerns about these results please call and leave a message for me or send a [facility] message to the clinic.    Sincerely,    Charlotte Amado, DO

## 2020-03-07 LAB — DEPRECATED CALCIDIOL+CALCIFEROL SERPL-MC: 26 UG/L (ref 20–75)

## 2020-03-19 ENCOUNTER — NURSE TRIAGE (OUTPATIENT)
Dept: NURSING | Facility: CLINIC | Age: 40
End: 2020-03-19

## 2020-03-19 PROCEDURE — 99282 EMERGENCY DEPT VISIT SF MDM: CPT | Performed by: EMERGENCY MEDICINE

## 2020-03-19 PROCEDURE — 99282 EMERGENCY DEPT VISIT SF MDM: CPT | Mod: Z6 | Performed by: EMERGENCY MEDICINE

## 2020-03-19 PROCEDURE — 99281 EMR DPT VST MAYX REQ PHY/QHP: CPT

## 2020-03-20 ENCOUNTER — HOSPITAL ENCOUNTER (EMERGENCY)
Facility: CLINIC | Age: 40
Discharge: HOME | End: 2020-03-20
Attending: EMERGENCY MEDICINE | Admitting: EMERGENCY MEDICINE
Payer: COMMERCIAL

## 2020-03-20 VITALS
HEART RATE: 97 BPM | SYSTOLIC BLOOD PRESSURE: 133 MMHG | TEMPERATURE: 99 F | OXYGEN SATURATION: 99 % | DIASTOLIC BLOOD PRESSURE: 83 MMHG | RESPIRATION RATE: 16 BRPM

## 2020-03-20 DIAGNOSIS — Z71.1 WORRIED WELL: ICD-10-CM

## 2020-03-20 RX ORDER — ACETAMINOPHEN 500 MG
500-1000 TABLET ORAL EVERY 6 HOURS PRN
COMMUNITY
End: 2022-09-20

## 2020-03-20 ASSESSMENT — ENCOUNTER SYMPTOMS
SHORTNESS OF BREATH: 0
NEUROLOGICAL NEGATIVE: 1
GASTROINTESTINAL NEGATIVE: 1
FEVER: 0
COUGH: 0
MUSCULOSKELETAL NEGATIVE: 1

## 2020-03-20 NOTE — ED AVS SNAPSHOT
[facility], [place], Emergency Department  [address]  [place] [address]  Phone:  [phone]  Fax:  [phone]                                    Marti Vale   MRN: [id number]    Department:  [facility], Emergency Department   Date of Visit:  3/19/2020           After Visit Summary Signature Page    I have received my discharge instructions, and my questions have been answered. I have discussed any challenges I see with this plan with the nurse or doctor.    ..........................................................................................................................................  Patient/Patient Representative Signature      ..........................................................................................................................................  Patient Representative Print Name and Relationship to Patient    ..................................................               ................................................  Date                                   Time    ..........................................................................................................................................  Reviewed by Signature/Title    ...................................................              ..............................................  Date                                               Time          22EPIC Rev 08/18

## 2020-03-20 NOTE — ED PROVIDER NOTES
[facility] EMERGENCY DEPARTMENT ([facility])     2020    ED Provider Note  [facility]      History     Chief Complaint   Patient presents with     Fever     reports subjective feelings of generalized warmth, did not take temperature at home, worried about covid-19 infection, denies exposure     HPI  Marti Vale is a 40 year old female who is doing well, essentially asymptomatic, but had some worry about possible COVID.  She does not have cough, does not have fever, and no known infectious contacts. She's spending most of her time at home with her children. Here, her vital signs are normal.  No recent travel.      This part of the medical record was transcribed by Klaudia Lai,  Medical Scribe, from a dictation done by Aashish Doty MD.       Past Medical History  Past Medical History:   Diagnosis Date     Anemia      Past Surgical History:   Procedure Laterality Date      SECTION N/A 10/19/2017    Procedure:  SECTION;  Repeat  Section ;  Surgeon: Dalila Hensley MD;  [facility] L[facility]      SECTION        SECTION       DILATION AND CURETTAGE SUCTION N/A 2015    Procedure: DILATION AND CURETTAGE SUCTION;  Surgeon: Mita Wadsworth MD;  Location: [facility] OR     GYN SURGERY       x3     acetaminophen (TYLENOL) 500 MG tablet  Cholecalciferol (VITAMIN D) 2000 UNITS tablet  multivitamin, therapeutic (THERA-VIT) TABS tablet      No Known Allergies  Past medical history, past surgical history, medications, and allergies were reviewed with the patient. Additional pertinent items: None    Family History  Family History   Problem Relation Age of Onset     Diabetes No family hx of      Coronary Artery Disease No family hx of      Hypertension No family hx of      Hyperlipidemia No family hx of      Cerebrovascular Disease No family hx of      Breast Cancer No family hx of      Colon Cancer No family hx of       Prostate Cancer No family hx of      Other Cancer No family hx of      Depression No family hx of      Anxiety Disorder No family hx of      Mental Illness No family hx of      Crohn's Disease No family hx of      Ulcerative Colitis No family hx of      Family history was reviewed with the patient. Additional pertinent items: None    Social History  Social History     Tobacco Use     Smoking status: Never Smoker     Smokeless tobacco: Never Used   Substance Use Topics     Alcohol use: No     Drug use: No      Social history was reviewed with the patient. Additional pertinent items: None    Review of Systems   Constitutional: Negative for fever.   HENT: Negative.    Respiratory: Negative for cough.    Cardiovascular: Negative.    Gastrointestinal: Negative.    All other systems reviewed and are negative.    A complete review of systems was performed with pertinent positives and negatives noted in the HPI, and all other systems negative.    Physical Exam   BP: 133/83  Pulse: 97  Temp: 99  F (37.2  C)  Resp: 16  SpO2: 99 %  Physical Exam  Vitals signs and nursing note reviewed.   Constitutional:       General: She is not in acute distress.     Appearance: She is well-developed.   HENT:      Head: Normocephalic and atraumatic.   Eyes:      General: No scleral icterus.     Conjunctiva/sclera: Conjunctivae normal.      Pupils: Pupils are equal, round, and reactive to light.   Neck:      Musculoskeletal: Neck supple.   Cardiovascular:      Rate and Rhythm: Normal rate and regular rhythm.      Heart sounds: Normal heart sounds.   Pulmonary:      Effort: Pulmonary effort is normal. No respiratory distress.      Breath sounds: Normal breath sounds. No wheezing.   Skin:     General: Skin is warm and dry.   Neurological:      Mental Status: She is alert and oriented to person, place, and time.      Cranial Nerves: No cranial nerve deficit.   Psychiatric:         Mood and Affect: Mood normal.         Behavior: Behavior normal.          ED Course      Procedures             No results found for any visits on 03/20/20.  Medications - No data to display     Assessments & Plan (with Medical Decision Making)   40-year-old female here worried about COVID. She is afebrile with normal oxygen saturations and normal lung exam. She is very low risk. There is no indication for any additional work-up such as chest x-ray or seasonal influenza testing based on her lack of symptoms and well appearance. She goes to the [facility] clinic, she can follow-up if she is not doing well or her condition changes.  She will be discharged with the no testing COVID instructions.    This part of the medical record was transcribed by Klaudia Lai,  Medical Scribe, from a dictation done by Aashish Doyt MD.     I have reviewed the nursing notes. I have reviewed the findings, diagnosis, plan and need for follow up with the patient.    New Prescriptions    No medications on file       Final diagnoses:   Worried well       --  Aashish Doty MD  Emergency Medicine   [facility], [place], EMERGENCY DEPARTMENT  3/19/2020     Aashish Doty MD  03/30/20 1130

## 2020-03-20 NOTE — ED PROVIDER NOTES
ED Provider Note  [facility]      History   No chief complaint on file.    MAGALI Vale is a 40 year old female who has concern for Covid-19 19 despite having few if any symptoms and no known infectious contacts.  She has no fever no cough or other concerning symptoms.  She describes a intermittent atypical chest discomfort.  No dyspnea    Past Medical History  Past Medical History:   Diagnosis Date     Anemia      Past Surgical History:   Procedure Laterality Date      SECTION N/A 10/19/2017    Procedure:  SECTION;  Repeat  Section ;  Surgeon: Dalila Hensley MD;  [facility]      SECTION        SECTION       DILATION AND CURETTAGE SUCTION N/A 2015    Procedure: DILATION AND CURETTAGE SUCTION;  Surgeon: Mita Wadsworth MD;  [facility]     GYN SURGERY       x3     Cholecalciferol (VITAMIN D) 2000 UNITS tablet  multivitamin, therapeutic (THERA-VIT) TABS tablet      No Known Allergies  Past medical history, past surgical history, medications, and allergies were reviewed with the patient. Additional pertinent items: None    Family History  Family History   Problem Relation Age of Onset     Diabetes No family hx of      Coronary Artery Disease No family hx of      Hypertension No family hx of      Hyperlipidemia No family hx of      Cerebrovascular Disease No family hx of      Breast Cancer No family hx of      Colon Cancer No family hx of      Prostate Cancer No family hx of      Other Cancer No family hx of      Depression No family hx of      Anxiety Disorder No family hx of      Mental Illness No family hx of      Crohn's Disease No family hx of      Ulcerative Colitis No family hx of      Family history was reviewed with the patient. Additional pertinent items: None    Social History  Social History     Tobacco Use     Smoking status: Never Smoker     Smokeless tobacco: Never Used   Substance Use  Topics     Alcohol use: No     Drug use: No      Social history was reviewed with the patient. Additional pertinent items: None    Review of Systems   Constitutional: Negative for fever.   Respiratory: Negative for cough and shortness of breath.    Cardiovascular: Positive for chest pain. Negative for leg swelling.   Gastrointestinal: Negative.    Genitourinary: Negative.    Musculoskeletal: Negative.    Neurological: Negative.    All other systems reviewed and are negative.    A complete review of systems was performed with pertinent positives and negatives noted in the HPI, and all other systems negative.    Physical Exam      Physical Exam  Vitals signs and nursing note reviewed.   Constitutional:       Appearance: Normal appearance.   HENT:      Mouth/Throat:      Mouth: Mucous membranes are moist.      Pharynx: Oropharynx is clear.   Eyes:      Extraocular Movements: Extraocular movements intact.   Cardiovascular:      Rate and Rhythm: Normal rate and regular rhythm.   Pulmonary:      Effort: Pulmonary effort is normal.      Breath sounds: Normal breath sounds. No wheezing or rhonchi.   Neurological:      General: No focal deficit present.      Mental Status: She is alert and oriented to person, place, and time.   Psychiatric:         Mood and Affect: Mood normal.         Behavior: Behavior normal.         ED Course      Procedures             No results found for any visits on 03/20/20.  Medications - No data to display     Assessments & Plan (with Medical Decision Making)   Patient here with concern about possible Covid-19 19 she is afebrile not hypoxic and her lungs are clear she does not meet criteria for any additional work-up or testing.  She is discharged home with a standard non-testing discharge instructions.    I have reviewed the nursing notes. I have reviewed the findings, diagnosis, plan and need for follow up with the patient.    New Prescriptions    No medications on file       Final diagnoses:    Worried well       --  Aashish Doty MD   Emergency Medicine   [facility], [place], EMERGENCY DEPARTMENT  3/19/2020     Aashish Doty MD  03/30/20 1144

## 2020-03-20 NOTE — TELEPHONE ENCOUNTER
"Daughter Hayley reports that Marti has shortness of breath, weakness, fever (temperature unknown, chills since 8:30 PM. Feels like her shoulders have a \"heavy weight.\" No known COVID exposure. No recent international travel.     Per protocol, advised ED. Care advice reviewed. Caller verbalizes understanding and plans to head to [facility] ED. Advised to call back with further questions/concerns.     FNA phoned [facility] ED charge nurse (Evaristo) and notified him of patient's arrival.    Sherrell Cowart RN/[facility] Nurse Advisor    Reason for Disposition    [1] MODERATE difficulty breathing (e.g., speaks in phrases, SOB even at rest, pulse 100-120) AND [2] NEW-onset or WORSE than normal    Additional Information    Negative: [1] Breathing stopped AND [2] hasn't returned    Negative: Choking on something    Negative: Severe difficulty breathing (e.g., struggling for each breath, speaks in single words)    Negative: Bluish (or gray) lips or face now    Negative: Difficult to awaken or acting confused (e.g., disoriented, slurred speech)    Negative: Passed out (i.e., lost consciousness, collapsed and was not responding)    Negative: Wheezing started suddenly after medicine, an allergic food or bee sting    Negative: Stridor    Negative: Slow, shallow and weak breathing    Negative: Sounds like a life-threatening emergency to the triager    Protocols used: BREATHING DIFFICULTY-A-AH      "

## 2020-03-20 NOTE — DISCHARGE INSTRUCTIONS
TODAY'S VISIT  YOU WERE EVALUATED TODAY AND IT HAS BEEN DECIDED YOU DID NOT NEED TO BE TESTED FOR COVID-19 (NOVEL CORONAVIRUS) AT THIS TIME.   -  The cause of your symptoms is not yet known,  and you did not meet criteria to be tested for COVID-19 at this time.   -  It has been determined that you do not medically need to be hospitalized at this time, and  you can be monitored with self-monitoring, and follow-up with your usual providers as needed (see information below).     SELF-MONITORING INSTRUCTIONS  STAY HOME. If you are feeling ill, we generally recommend people do not go to work, school, or public areas if you are able. Avoid using public transportation, ride-sharing (Uber/Lyft), or taxis. You should only leave your home if you require medical attention (See instructions below, please contact your provider first.)   DO NOT SHARE HOUSEHOLD ITEMS. Do not share dishes, drinking glasses, eating utensils, towels, bedding, etc., with others or pets in your home. These items should be washed with soap and water.   WASH YOUR HANDS OFTEN. Wash your hands with soap and water for at least 20 second, or use hand  regularly. Avoid touching your face with unwashed hands.   SEEK PROMPT MEDICAL ATTENTION IF YOUR ILLNESS IS WORSENING. Watch closely for new or worsening symptoms, such as fever, cough, shortness of breath or difficulty breathing.   PLEASE CONTACT YOUR HEALTHCARE PROVIDER FOR GUIDANCE, OR IF YOU HAVE REMAINING CONCERNS ABOUT THE POSSIBILITY OF COVID-19 INFECTION, TAKE THE FOLLOWING STEPS:  Go to www.[email or web address]. [facility] is our 24/7 online patient care portal. Once there, create an account and start your evaluation. Once you have submitted your information, you will receive care instructions relevant to your needs. If multiple people in your family need testing, each will have to start a separate case in [facility].  After reviewing the information you submitted about your symptoms and exposure, our care  providers may direct you to come to an [facility] clinic location for testing.  SIGN UP FOR Sqrrl. Sign up for the [facility] application, using the information at the end of this document. Your results and a message about those results can be sent through [employer]. If you do not have [facility], we will call you with your results, but it may take longer.  IF YOU NEED MEDICAL CARE OR HAVE A MEDICAL APPOINTMENT (and it is not an emergency):   -  CALL YOUR HEALTHCARE PROVIDER BEFORE GOING TO THE [facility]  -  TELL THEM THAT YOU ARE BEING TESTING FOR AND MAY HAVE COVID-19.  YOUR CLOSE CONTACTS SHOULD MONITOR THEIR HEALTH. If they develop symptoms, they should visit [email or web address] to determine if testing is needed, and where this is done.   If you have any questions, please contact your usual health care provider or the [place] Department of Health ([facility]) at [phone]    EMERGENCY INSTRUCTIONS  IF YOU NEED EMERGENCY MEDICAL ATTENTION, CALL 911 AND LET THEM KNOW YOU MAY HAVE ARE BEING EVALUATED FOR COVID-19.    WHAT IS COVID-19 (CORONAVIRUS)  COVID-19 is a viral respiratory illness caused by a newly identified coronavirus that was discovered in late 2019 in [place]. Coronaviruses are a large family of viruses. Some coronaviruses cause illnesses in people and others only circulate among animals. Rarely, animal coronaviruses can evolve and infect people. The virus causing COVID-19 may have emerged from an animal source, and it is now able to spread from person to person.     How does it spread?   The virus is thought to spread between people who are in close contact (within about six feet) through respiratory droplets produced when an infected person coughs, sneezes, or talks. It may also spread when one person touches a surface or object that has the virus on it and then touches their mouth, nose, or eyes. However, this is not thought to be the main way the virus is transmitted.    SYMPTOMS  This coronavirus  causes mild to severe respiratory illness with often with fever, cough, and/or difficulty breathing. After exposure, symptoms typically present within 2 to 14 days.     TREATMENTS AND PREVENTION  Patients may also be asked to self-quarantine at home in order to prevent the spread of the coronavirus. There is no antiviral treatment recommended for COVID-19. People with COVID-19 may receive supportive care to help relieve symptoms.    Prevention  No vaccine is currently available for the coronavirus causing COVID-19. The best way to prevent spread of the illness is to avoid exposure through simple precautions. Prevention steps include:   Stay home if you're feeling sick.   Avoid close contact with others, and try to stay at least 6-feet away from others if you're ill.   Wash your hands often with soap and warm water for at least 20 seconds, especially after going to the bathroom; before eating; and after blowing your nose, coughing, or sneezing. Use an alcohol-based hand  with at least 60 percent alcohol if soap and water are not readily available.   Clean and disinfect frequently touched objects and surfaces using a regular household cleaning spray or wipe.     Thank you for your understanding and cooperation.

## 2020-03-30 ASSESSMENT — ENCOUNTER SYMPTOMS
GASTROINTESTINAL NEGATIVE: 1
CARDIOVASCULAR NEGATIVE: 1

## 2020-04-01 NOTE — NURSING NOTE
Due to patient being non-English speaking/uses sign language, an  was used for this visit. Only for face-to-face interpretation by an external agency, date and length of interpretation can be found on the scanned worksheet.     name: Brielle Spring  Agency: [employer]  Language: [place]   Telephone number: [phone]  Type of interpretation: Face-to-face, spoken    Joya Klein CMA

## 2021-03-24 NOTE — PROGRESS NOTES
Assessment & Plan     Gastroesophageal reflux disease, unspecified whether esophagitis present  Suggestive of H.pylori. Will complete testing after 2 weeks off PPI. Describes severe sx, no wt loss or other red flags. If persistent and testing negative, consider EGD.   - H Pylori antigen stool  - famotidine (PEPCID) 20 MG tablet  Dispense: 60 tablet; Refill: 3    Routine health maintenance  Reviewed that she had pelvic but never pap done in 2017. Declines pap and tdap today but will plan on these at next CPE visit within a year.   - Hepatitis C antibody  - Glucose (Yane's)  - Lipid Cascade (Yane's)    Fatigue, unspecified type  - Hemoglobin (HGB) (Yane's)  - Ferritin  - Hemoglobin A1c (Yane's)    Contraceptive counseling  Wants to be done w/ childbearing. Declines contraception. We did briefly review natural family planning as a non-medication option as long as her menses remain regular. Cautioned that conception is very much still possible for her.       Return if symptoms worsen or fail to improve.    Lacy Ibrahim DO  [facility] RENE Kidd is a 41 year old who presents for the following health issues     HPI       When she eats food - feels burpy, reflux, chest pain, shaking. Going on for about a year.   - medications help. Has tried several different, currently using calcium carbonate (generic tums ) and a natural supplement (maybe apple cider vinegar?). Takes a multivitamin with probiotic. Has also tried omeprazole but after 2 weeks made her feel dizzy and weird so she stopped. Manuka honey.   - scared of most foods - all foods bother her.   - denies constipation or diarrhea. Uses occasional laxative - chocolate. No blood in stool.   - no nausea or vomiting     No other health concerns.       SOCIAL HISTORY  Alcohol: no    Tobacco: no   Drug use: no       SEXUAL HEALTH HISTORY  Sexual concerns: No   Pregnancy History:   LMP Menses: q 28 days  Last Pap  "Smear Date: said done in 2017 - reviewed note and had spec exam but pap deferred due to cervicitis   Abnormal Pap History: None  STI History: Neg    Does not desire further pregnancy - children are older, wants to be done with childbearing. Using \"natural herbs\" as a contraceptive measure. Does not desire any prescription contraceptive at this time.     RECOMMENDED SCREENING  Pap/HPV cotest every 5 years for women 30-65   Declined  Accepts HCV screen    Review of Systems   Pertinent positives and negatives per HPI.        Objective    /79 (BP Location: Left arm, Patient Position: Sitting, Cuff Size: Adult Regular)   Pulse 75   Temp 98.4  F (36.9  C) (Oral)   Resp 16   Ht 1.63 m (5' 4.17\")   Wt 87.9 kg (193 lb 12.8 oz)   LMP 03/22/2021 (Within Weeks)   Breastfeeding No   BMI 33.09 kg/m    Body mass index is 33.09 kg/m .  Physical Exam   GENERAL: healthy, alert and no distress  NECK: no adenopathy, no asymmetry, masses, or scars and thyroid normal to palpation  RESP: lungs clear to auscultation - no rales, rhonchi or wheezes  CV: regular rate and rhythm, normal S1 S2, no S3 or S4, no murmur, click or rub, no peripheral edema and peripheral pulses strong  ABDOMEN: soft, nontender, no hepatosplenomegaly, no masses and bowel sounds normal  MS: no gross musculoskeletal defects noted, no edema       "

## 2021-03-25 ENCOUNTER — OFFICE VISIT (OUTPATIENT)
Dept: FAMILY MEDICINE | Facility: CLINIC | Age: 41
End: 2021-03-25
Payer: COMMERCIAL

## 2021-03-25 VITALS
SYSTOLIC BLOOD PRESSURE: 115 MMHG | WEIGHT: 193.8 LBS | BODY MASS INDEX: 33.09 KG/M2 | TEMPERATURE: 98.4 F | DIASTOLIC BLOOD PRESSURE: 79 MMHG | HEIGHT: 64 IN | RESPIRATION RATE: 16 BRPM | HEART RATE: 75 BPM

## 2021-03-25 DIAGNOSIS — Z30.09 GENERAL COUNSELLING AND ADVICE ON CONTRACEPTION: ICD-10-CM

## 2021-03-25 DIAGNOSIS — Z00.00 ROUTINE HEALTH MAINTENANCE: Primary | ICD-10-CM

## 2021-03-25 DIAGNOSIS — R53.83 FATIGUE, UNSPECIFIED TYPE: ICD-10-CM

## 2021-03-25 DIAGNOSIS — K21.9 GASTROESOPHAGEAL REFLUX DISEASE, UNSPECIFIED WHETHER ESOPHAGITIS PRESENT: ICD-10-CM

## 2021-03-25 LAB
CHOLEST SERPL-MCNC: 191.8 MG/DL (ref 0–200)
CHOLEST/HDLC SERPL: 3.5 {RATIO} (ref 0–5)
FERRITIN SERPL-MCNC: 64 NG/ML (ref 12–150)
GLUCOSE SERPL-MCNC: 116 MG'DL (ref 70–99)
HDLC SERPL-MCNC: 55 MG/DL
HEMOGLOBIN: 13.4 G/DL (ref 11.7–15.7)
LDLC SERPL CALC-MCNC: 122 MG/DL (ref 0–129)
TRIGL SERPL-MCNC: 72.6 MG/DL (ref 0–150)
VLDL CHOLESTEROL: 14.5 MG/DL (ref 7–32)

## 2021-03-25 PROCEDURE — 86803 HEPATITIS C AB TEST: CPT | Performed by: STUDENT IN AN ORGANIZED HEALTH CARE EDUCATION/TRAINING PROGRAM

## 2021-03-25 PROCEDURE — 99396 PREV VISIT EST AGE 40-64: CPT | Performed by: STUDENT IN AN ORGANIZED HEALTH CARE EDUCATION/TRAINING PROGRAM

## 2021-03-25 PROCEDURE — 85018 HEMOGLOBIN: CPT | Performed by: STUDENT IN AN ORGANIZED HEALTH CARE EDUCATION/TRAINING PROGRAM

## 2021-03-25 PROCEDURE — 82947 ASSAY GLUCOSE BLOOD QUANT: CPT | Performed by: STUDENT IN AN ORGANIZED HEALTH CARE EDUCATION/TRAINING PROGRAM

## 2021-03-25 PROCEDURE — 80061 LIPID PANEL: CPT | Performed by: STUDENT IN AN ORGANIZED HEALTH CARE EDUCATION/TRAINING PROGRAM

## 2021-03-25 PROCEDURE — 82728 ASSAY OF FERRITIN: CPT | Performed by: STUDENT IN AN ORGANIZED HEALTH CARE EDUCATION/TRAINING PROGRAM

## 2021-03-25 PROCEDURE — 99213 OFFICE O/P EST LOW 20 MIN: CPT | Mod: 25 | Performed by: STUDENT IN AN ORGANIZED HEALTH CARE EDUCATION/TRAINING PROGRAM

## 2021-03-25 PROCEDURE — 36415 COLL VENOUS BLD VENIPUNCTURE: CPT | Performed by: STUDENT IN AN ORGANIZED HEALTH CARE EDUCATION/TRAINING PROGRAM

## 2021-03-25 RX ORDER — FAMOTIDINE 20 MG/1
20 TABLET, FILM COATED ORAL 2 TIMES DAILY
Qty: 60 TABLET | Refills: 3 | Status: SHIPPED | OUTPATIENT
Start: 2021-03-25 | End: 2022-01-13

## 2021-03-25 ASSESSMENT — MIFFLIN-ST. JEOR: SCORE: 1521.82

## 2021-03-25 NOTE — PATIENT INSTRUCTIONS
Patient Education   Here is the plan from today's visit    Test for H.pylori. wait 2 weeks from last dose of Prilosec pill before giving the sample.     Blood tests today for anemia, cholesterol, blood sugar.     Come back for pap smear and tetanus/pertussis vaccine.     Please call or return to clinic if your symptoms don't go away.        Thank you for coming to [facility] today.  Lab Testing:  **If you had lab testing today and your results are reassuring or normal they will be mailed to you or sent through [facility] within 7 days.   **If the lab tests need quick action we will call you with the results.  The phone number we will call with results is # [phone] (home) . If this is not the best number please call our clinic and change the number.  Medication Refills:  If you need any refills please call your pharmacy and they will contact us.   If you need to  your refill at a new pharmacy, please contact the new pharmacy directly. The new pharmacy will help you get your medications transferred faster.   Scheduling:  If you have any concerns about today's visit or wish to schedule another appointment please call our office during normal business hours [phone] (8-5:00 M-F)  If a referral was made to a [facility] and you don't get a call from central scheduling please call [phone].  If a Mammogram was ordered for you at [facility] call [phone] to schedule or change your appointment.  If you had an XRay/CT/Ultrasound/MRI ordered the number is [phone] to schedule or change your radiology appointment.   Medical Concerns:  If you have urgent medical concerns please call [phone] at any time of the day.    Lacy Ibrahim,

## 2021-03-26 LAB — HCV AB SERPL QL IA: NONREACTIVE

## 2021-03-29 ENCOUNTER — TELEPHONE (OUTPATIENT)
Dept: FAMILY MEDICINE | Facility: CLINIC | Age: 41
End: 2021-03-29

## 2021-03-29 DIAGNOSIS — R73.01 ELEVATED FASTING GLUCOSE: Primary | ICD-10-CM

## 2021-03-29 NOTE — TELEPHONE ENCOUNTER
Pt requested call with results. Her cholesterol levels look very good, which is wonderful. She has no anemia and no iron deficiency. The hepatitis C virus screening test we did was negative - she does not have that virus.     Her fasting blood sugar was mildly high at 116. Normal is <100. This could be a sign that she has pre-diabetes. When she comes back to lab with her H.pylori stool sample, I would like her to also have a blood draw to check a diabetes screening test called a hemoglobin A1c. This test tells us what her blood sugars have looked like over the past 3 months or so and will give us more information. She does not have to be fasting.

## 2021-03-29 NOTE — TELEPHONE ENCOUNTER
RN spoke to patient via  [phone] and relayed message below about lab results. She verbalized understanding and set up lab only appointment for tomorrow 3/29/21 to check A1C. Patient will also  her H. Pylori test at that time.   Sonia Gar RN

## 2021-03-31 DIAGNOSIS — R73.01 ELEVATED FASTING GLUCOSE: ICD-10-CM

## 2021-03-31 LAB — HBA1C MFR BLD: 5.7 % (ref 4.1–5.7)

## 2021-03-31 PROCEDURE — 36416 COLLJ CAPILLARY BLOOD SPEC: CPT

## 2021-03-31 PROCEDURE — 83036 HEMOGLOBIN GLYCOSYLATED A1C: CPT

## 2022-01-13 ENCOUNTER — OFFICE VISIT (OUTPATIENT)
Dept: FAMILY MEDICINE | Facility: CLINIC | Age: 42
End: 2022-01-13
Payer: COMMERCIAL

## 2022-01-13 VITALS
OXYGEN SATURATION: 100 % | DIASTOLIC BLOOD PRESSURE: 76 MMHG | BODY MASS INDEX: 33.49 KG/M2 | HEIGHT: 65 IN | SYSTOLIC BLOOD PRESSURE: 113 MMHG | RESPIRATION RATE: 16 BRPM | HEART RATE: 60 BPM | WEIGHT: 201 LBS | TEMPERATURE: 97.9 F

## 2022-01-13 DIAGNOSIS — R10.13 EPIGASTRIC PAIN: Primary | ICD-10-CM

## 2022-01-13 DIAGNOSIS — K21.9 GASTROESOPHAGEAL REFLUX DISEASE, UNSPECIFIED WHETHER ESOPHAGITIS PRESENT: ICD-10-CM

## 2022-01-13 DIAGNOSIS — R11.0 NAUSEA: ICD-10-CM

## 2022-01-13 PROCEDURE — 99214 OFFICE O/P EST MOD 30 MIN: CPT | Performed by: STUDENT IN AN ORGANIZED HEALTH CARE EDUCATION/TRAINING PROGRAM

## 2022-01-13 RX ORDER — ONDANSETRON 4 MG/1
4 TABLET, FILM COATED ORAL EVERY 8 HOURS PRN
Qty: 15 TABLET | Refills: 0 | Status: SHIPPED | OUTPATIENT
Start: 2022-01-13 | End: 2022-09-20

## 2022-01-13 RX ORDER — FAMOTIDINE 20 MG/1
20 TABLET, FILM COATED ORAL 2 TIMES DAILY
Qty: 60 TABLET | Refills: 3 | Status: SHIPPED | OUTPATIENT
Start: 2022-01-13 | End: 2023-04-03

## 2022-01-13 ASSESSMENT — MIFFLIN-ST. JEOR: SCORE: 1555.73

## 2022-01-13 NOTE — PATIENT INSTRUCTIONS
Patient Education   Here is the plan from today's visit    Your nausea and belly pain are likely from irritation of your stomach.   It may be from H pylori  - we are doing a stool test for H pylori. We'll call you with results and treat the H pylori if positive  - increase the famotidine to twice a day  - you can take the zofran if you need for nausea while we work on treating the cause    Please call or return to clinic if your symptoms don't go away.    Follow up plan  Return for Follow up 1 month for nausea.    Thank you for coming to [facility] today.  Lab Testing:  **If you had lab testing today and your results are reassuring or normal they will be mailed to you or sent through [facility] within 7 days.   **If the lab tests need quick action we will call you with the results.  **If you are having labs done on a different day, please call [phone] to schedule at [facility] or [phone] for other [facility] locations. Labs do not offer walk-in appointments.  The phone number we will call with results is # [phone] (home) . If this is not the best number please call our clinic and change the number.  Medication Refills:  If you need any refills please call your pharmacy and they will contact us.   If you need to  your refill at a new pharmacy, please contact the new pharmacy directly. The new pharmacy will help you get your medications transferred faster.   Scheduling:  If you have any concerns about today's visit or wish to schedule another appointment please call our office during normal business hours [phone] (8-5:00 M-F)  If a referral was made to an [facility] specialty provider and you do not get a call from central scheduling, please refer to directions on your visit summary or call our office during normal business hours for assistance.   If a Mammogram was ordered for you at the [facility] call [phone] to schedule or change your  appointment.  If you had an XRay/CT/Ultrasound/MRI ordered the number is [phone] to schedule or change your radiology appointment.   [facility] has limited ultrasound appointments available on Wednesdays, if you would like your ultrasound at [facility], please call [phone] to schedule.   Medical Concerns:  If you have urgent medical concerns please call [phone] at any time of the day.    Danielle Duggan MD

## 2022-01-13 NOTE — PROGRESS NOTES
Assessment & Plan     Marti was seen today for gastrophageal reflux.    Diagnoses and all orders for this visit:    Epigastric pain  Nausea  Gastroesophageal reflux disease, unspecified whether esophagitis present  Patient with history of GERD and has been asymptomatic on 20 mg famotidine daily up until 2 days ago, when she developed nausea associated with meals and intermittent epigastric pain. Otherwise feeling well. No NSAID use, and has not been tested for H pylori. Differential includes gastritis, peptic ulcer disease, GERD, pregnancy. Sexually active and LMP 1/4 or 1/5; declines pregnancy testing. Increase famotidine to BID while waiting for H pylori testing. Ondansetron for nausea.    -     Helicobacter pylori Antigen Stool; Future  -     ondansetron (ZOFRAN) 4 MG tablet; Take 1 tablet (4 mg) by mouth every 8 hours as needed for nausea  -     famotidine (PEPCID) 20 MG tablet; Take 1 tablet (20 mg) by mouth 2 times daily      Return for Follow up 1 month for nausea.     Danielle Duggan MD  [facility]   Marti Vale is a 42 year old  who presents for the following health issues     Patient presents with:  Gastrophageal Reflux: stomach pain related to acid reflux     Reflux:  - 2-3 days of nausea, epigastric pain  - no vomiting  - nausea when eats, pain was just last night  - tender, squeezing pain  - takes famotidine 20 mg once daily in the morning  - had been feeling better   - no new foods, medications  - no diarrhea, constipation  - doesn't take ibuprofen or other NSAIDs  - didn't do H pylori test  - otherwise feeling well. No sob, chest pain, congestion, cough  - lemon helps    - LMP 1/4 or 1/5. Doesn't believe she is pregnant. Declines testing    Chart Review:  - PMH: GERD, vit D def, latent TB  - last seen 3/25/21 for GERD - severe symptoms. H pylori stool antigen, and famotidine 20 mg     Review of Systems   Constitutional, HEENT, cardiovascular,  "pulmonary, GI, and  systems are negative, except as otherwise noted.      Objective    /76   Pulse 60   Temp 97.9  F (36.6  C) (Oral)   Resp 16   Ht 1.64 m (5' 4.57\")   Wt 91.2 kg (201 lb)   LMP 01/10/2022 (Approximate)   SpO2 100%   BMI 33.90 kg/m    Body mass index is 33.9 kg/m .    Physical Exam   Physical Exam  Constitutional:       Appearance: Normal appearance.   HENT:      Head: Normocephalic.   Eyes:      Conjunctiva/sclera: Conjunctivae normal.   Cardiovascular:      Rate and Rhythm: Normal rate and regular rhythm.      Heart sounds: Normal heart sounds. No murmur heard.  No friction rub. No gallop.    Pulmonary:      Effort: Pulmonary effort is normal.      Breath sounds: Normal breath sounds. No wheezing, rhonchi or rales.   Abdominal:      General: Abdomen is flat. Bowel sounds are normal. There is no distension.      Palpations: Abdomen is soft. There is no mass.      Tenderness: There is abdominal tenderness (mild, epigastric).      Hernia: No hernia is present.   Skin:     General: Skin is warm and dry.   Neurological:      Mental Status: She is alert.   Psychiatric:         Mood and Affect: Mood normal.         Behavior: Behavior normal.         Thought Content: Thought content normal.         Judgment: Judgment normal.        ----- Service Performed and Documented by Resident  ------        "

## 2022-01-21 NOTE — PROGRESS NOTES
Preceptor Attestation:  Patient seen and evaluated in person. I discussed the patient with the resident. I have verified the content of the note, which accurately reflects my assessment of the patient and the plan of care.   Supervising Physician:  Lacy Ibrahim DO

## 2022-02-20 ENCOUNTER — HOSPITAL ENCOUNTER (EMERGENCY)
Facility: CLINIC | Age: 42
Discharge: HOME | End: 2022-02-20
Attending: EMERGENCY MEDICINE | Admitting: EMERGENCY MEDICINE
Payer: COMMERCIAL

## 2022-02-20 VITALS
DIASTOLIC BLOOD PRESSURE: 66 MMHG | TEMPERATURE: 98.4 F | RESPIRATION RATE: 16 BRPM | SYSTOLIC BLOOD PRESSURE: 106 MMHG | HEART RATE: 77 BPM | WEIGHT: 202 LBS | OXYGEN SATURATION: 96 % | BODY MASS INDEX: 34.07 KG/M2

## 2022-02-20 DIAGNOSIS — L03.011 PARONYCHIA OF FINGER OF RIGHT HAND: ICD-10-CM

## 2022-02-20 PROCEDURE — 10060 I&D ABSCESS SIMPLE/SINGLE: CPT | Performed by: EMERGENCY MEDICINE

## 2022-02-20 PROCEDURE — 250N000009 HC RX 250: Performed by: EMERGENCY MEDICINE

## 2022-02-20 PROCEDURE — 99283 EMERGENCY DEPT VISIT LOW MDM: CPT | Mod: 25 | Performed by: EMERGENCY MEDICINE

## 2022-02-20 PROCEDURE — 99282 EMERGENCY DEPT VISIT SF MDM: CPT | Mod: 25 | Performed by: EMERGENCY MEDICINE

## 2022-02-20 RX ORDER — LIDOCAINE HYDROCHLORIDE 10 MG/ML
10 INJECTION, SOLUTION INFILTRATION; PERINEURAL
Status: COMPLETED | OUTPATIENT
Start: 2022-02-20 | End: 2022-02-20

## 2022-02-20 RX ADMIN — LIDOCAINE HYDROCHLORIDE 10 ML: 10 INJECTION, SOLUTION INFILTRATION; PERINEURAL at 19:03

## 2022-02-21 NOTE — ED NOTES
Pt states that she is pregnant with 7th child. Showed RN positive test result photo on cell from a home self test.

## 2022-02-21 NOTE — ED PROVIDER NOTES
ED Provider Note  [facility]      History     Chief Complaint   Patient presents with     Hand Pain     HPI  Marti Vale is a 42 year old female who presents to the emergency department with swelling of around the nailbed along with pain and discomfort on the right pointer finger.  This started several days ago but pain is worse today.  Pain is isolated just to the distal fingertip.  No swelling involved to the rest of the digit.  Normal range of motion of all joints involved.  No systemic symptoms.    Past Medical History  Past Medical History:   Diagnosis Date     Anemia      Past Surgical History:   Procedure Laterality Date      SECTION N/A 10/19/2017    Procedure:  SECTION;  Repeat  Section ;  Surgeon: Dalila Hensley MD;  Location: [facility] L+D      SECTION        SECTION       DILATION AND CURETTAGE SUCTION N/A 2015    Procedure: DILATION AND CURETTAGE SUCTION;  Surgeon: Mita Wadsworth MD;  Location: [facility] OR     GYN SURGERY       x3     acetaminophen (TYLENOL) 500 MG tablet  Cholecalciferol (VITAMIN D) 2000 UNITS tablet  famotidine (PEPCID) 20 MG tablet  multivitamin, therapeutic (THERA-VIT) TABS tablet  ondansetron (ZOFRAN) 4 MG tablet      No Known Allergies  Family History  Family History   Problem Relation Age of Onset     Diabetes No family hx of      Coronary Artery Disease No family hx of      Hypertension No family hx of      Hyperlipidemia No family hx of      Cerebrovascular Disease No family hx of      Breast Cancer No family hx of      Colon Cancer No family hx of      Prostate Cancer No family hx of      Other Cancer No family hx of      Depression No family hx of      Anxiety Disorder No family hx of      Mental Illness No family hx of      Crohn's Disease No family hx of      Ulcerative Colitis No family hx of      Social History   Social History     Tobacco Use     Smoking status: Never Smoker      Smokeless tobacco: Never Used   Substance Use Topics     Alcohol use: No     Drug use: No      Past medical history, past surgical history, medications, allergies, family history, and social history were reviewed with the patient. No additional pertinent items.       Review of Systems  A complete review of systems was performed with pertinent positives and negatives noted in the HPI, and all other systems negative.    Physical Exam   BP: 130/73  Pulse: 81  Temp: 98.4  F (36.9  C)  Resp: 16  Weight: 91.6 kg (202 lb)  SpO2: 98 %  Physical Exam  General: Awake alert no acute distress  Lungs: Breathing comfortably on room air  CV: Regular rate  Extremities: Patient has redness, tenderness, around the lateral nail on the right distal finger consistent with paronychia.  No redness or swelling more proximally.  Normal range of motion of the joints.  No swelling of the pad.      ED [facility]    PROCEDURE: -Incision/Drainage    Date/Time: 2/20/2022 6:51 PM  Performed by: Ry Leblanc MD  Authorized by: Ry Leblanc MD     Risks, benefits and alternatives discussed.      LOCATION:      Indications for incision and drainage: Right finger paronychia.    Location:  Upper extremity    Upper extremity location:  Hand    Hand location:  R hand    PRE-PROCEDURE DETAILS:     Skin preparation:  Antiseptic wash and Betadine    PROCEDURE TYPE:     Complexity:  Simple    ANESTHESIA (see MAR for exact dosages):     Anesthesia method:  Nerve block    Block location:  Right index finger digital block    Block needle gauge:  27 G    Block anesthetic:  Lidocaine 1% w/o epi    Block injection procedure:  Anatomic landmarks identified, introduced needle and negative aspiration for blood    Block outcome:  Anesthesia achieved    PROCEDURE DETAILS:     Incision types:  Single straight    Scalpel blade:  11    Drainage:  Purulent    Drainage amount:  Moderate    Wound  treatment:  Wound left open    PROCEDURE    Patient Tolerance:  Patient tolerated the procedure well with no immediate complications         The medical record was reviewed and interpreted.     Paronychia drained with digital block please see procedure note above.         No results found for any visits on 02/20/22.  Medications   lidocaine 1 % injection 10 mL (has no administration in time range)        Assessments & Plan (with Medical Decision Making)   Patient presents to the emergency department with clinically what appears to be a paronychia.  Digital block was performed, the paronychia was drained and patient had significant improvement of symptoms.  She was instructed to continue warm soaks for the next 2 to 3 days to allow the paronychia to continue to drain.  Can monitor for swelling of the finger, pain swelling or redness and moving proximally for failure for improvement of symptoms.  She should return to the ER should she develop any of the symptoms.    Do not feel that antibiotics are warranted at this time.  Should symptoms continue proximally not improve status post drainage may consider antibiotics at this time.    Patient understands discharge and follow-up instructions.    I have reviewed the nursing notes. I have reviewed the findings, diagnosis, plan and need for follow up with the patient.    New Prescriptions    No medications on file       Final diagnoses:   Paronychia of finger of right hand       --  Ry Leblanc  [facility] EMERGENCY DEPARTMENT  2/20/2022     Ry Leblanc MD  02/20/22 [id number]

## 2022-02-21 NOTE — DISCHARGE INSTRUCTIONS
Please keep your finger bandaged to protect it from injury while it is healing.    Please soak your finger for 10 to 20 minutes in warm soapy water 3-4 times a day for the next several days to allow the area to drain and heal.  In between soaking please keep it bandaged.  Can take 1000 mg Tylenol up to 4 times a day as needed for additional pain control.      If pain does not improve, if redness or swelling starts to move up the finger towards the knuckles or hand please return to the ER for repeat evaluation and treatment.

## 2022-03-25 DIAGNOSIS — N92.6 MISSED MENSES: Primary | ICD-10-CM

## 2022-03-25 NOTE — PROGRESS NOTES
[facility] patient. . Urine pregnancy test negative on 3/25/22. Pt reports LMP was 22. Pt states she had nausea and fatigue as in early pregnancy and took home UPT about 1 month prior which was positive. Pt strongly requests pelvic ultrasound to verify pregnancy status. Order placed.     ДМИТРИЙ Sauceda CNM

## 2022-03-29 ENCOUNTER — HOSPITAL ENCOUNTER (OUTPATIENT)
Dept: ULTRASOUND IMAGING | Facility: CLINIC | Age: 42
Discharge: HOME | End: 2022-03-29
Attending: MIDWIFE | Admitting: MIDWIFE
Payer: COMMERCIAL

## 2022-03-29 DIAGNOSIS — N92.6 MISSED MENSES: ICD-10-CM

## 2022-03-29 PROCEDURE — 76856 US EXAM PELVIC COMPLETE: CPT

## 2022-03-29 PROCEDURE — 76856 US EXAM PELVIC COMPLETE: CPT | Mod: 26 | Performed by: RADIOLOGY

## 2022-03-29 PROCEDURE — 76830 TRANSVAGINAL US NON-OB: CPT | Mod: 26 | Performed by: RADIOLOGY

## 2022-05-10 NOTE — LETTER
September 21, 2017      Marti Vale  [address] [id number]        Dear Marti,    Thank you for getting your care at [facility]. Please see below for your test results.    Resulted Orders   Vitamin D Level   Result Value Ref Range    Vitamin D Deficiency screening 35 20 - 75 ug/L      Comment:      Season, race, dietary intake, and treatment affect the concentration of   25-hydroxy-Vitamin D. Values may decrease during winter months and increase   during summer months. Values 20-29 ug/L may indicate Vitamin D insufficiency   and values <20 ug/L may indicate Vitamin D deficiency.  Vitamin D determination is routinely performed by an immunoassay specific for   25 hydroxyvitamin D3.  If an individual is on vitamin D2 (ergocalciferol)   supplementation, please specify 25 OH vitamin D2 and D3 level determination by   LCMSMS test [id number].         If you have any concerns about these results please call and leave a message for me or send a MyChart message to the clinic.    Sincerely,    Lita Alcocer MD     appt or at least urine lab with culture

## 2022-09-20 ENCOUNTER — OFFICE VISIT (OUTPATIENT)
Dept: FAMILY MEDICINE | Facility: CLINIC | Age: 42
End: 2022-09-20
Payer: COMMERCIAL

## 2022-09-20 VITALS
TEMPERATURE: 98.5 F | SYSTOLIC BLOOD PRESSURE: 107 MMHG | RESPIRATION RATE: 16 BRPM | BODY MASS INDEX: 33.55 KG/M2 | WEIGHT: 201.4 LBS | DIASTOLIC BLOOD PRESSURE: 68 MMHG | HEART RATE: 76 BPM | OXYGEN SATURATION: 97 % | HEIGHT: 65 IN

## 2022-09-20 DIAGNOSIS — Z12.4 SCREENING FOR CERVICAL CANCER: ICD-10-CM

## 2022-09-20 DIAGNOSIS — K21.00 GASTROESOPHAGEAL REFLUX DISEASE WITH ESOPHAGITIS WITHOUT HEMORRHAGE: ICD-10-CM

## 2022-09-20 DIAGNOSIS — Z71.85 VACCINE COUNSELING: ICD-10-CM

## 2022-09-20 DIAGNOSIS — E55.9 VITAMIN D DEFICIENCY: ICD-10-CM

## 2022-09-20 DIAGNOSIS — E66.811 CLASS 1 OBESITY WITHOUT SERIOUS COMORBIDITY WITH BODY MASS INDEX (BMI) OF 34.0 TO 34.9 IN ADULT, UNSPECIFIED OBESITY TYPE: ICD-10-CM

## 2022-09-20 DIAGNOSIS — Z30.09 ENCOUNTER FOR OTHER GENERAL COUNSELING OR ADVICE ON CONTRACEPTION: ICD-10-CM

## 2022-09-20 DIAGNOSIS — Z00.00 ENCOUNTER FOR ROUTINE ADULT HEALTH EXAMINATION WITHOUT ABNORMAL FINDINGS: Primary | ICD-10-CM

## 2022-09-20 LAB
ANION GAP SERPL CALCULATED.3IONS-SCNC: 9 MMOL/L (ref 7–15)
BUN SERPL-MCNC: 10.6 MG/DL (ref 6–20)
CALCIUM SERPL-MCNC: 9.6 MG/DL (ref 8.6–10)
CHLORIDE SERPL-SCNC: 101 MMOL/L (ref 98–107)
CHOLEST SERPL-MCNC: 246 MG/DL
CREAT SERPL-MCNC: 0.49 MG/DL (ref 0.51–0.95)
DEPRECATED CALCIDIOL+CALCIFEROL SERPL-MC: 30 UG/L (ref 20–75)
DEPRECATED HCO3 PLAS-SCNC: 29 MMOL/L (ref 22–29)
ERYTHROCYTE [DISTWIDTH] IN BLOOD BY AUTOMATED COUNT: 12.3 % (ref 10–15)
GFR SERPL CREATININE-BSD FRML MDRD: >90 ML/MIN/1.73M2
GLUCOSE SERPL-MCNC: 98 MG/DL (ref 70–99)
HBA1C MFR BLD: 5.9 % (ref 0–5.6)
HCT VFR BLD AUTO: 40.8 % (ref 35–47)
HDLC SERPL-MCNC: 70 MG/DL
HGB BLD-MCNC: 13.1 G/DL (ref 11.7–15.7)
LDLC SERPL CALC-MCNC: 162 MG/DL
MCH RBC QN AUTO: 28.5 PG (ref 26.5–33)
MCHC RBC AUTO-ENTMCNC: 32.1 G/DL (ref 31.5–36.5)
MCV RBC AUTO: 89 FL (ref 78–100)
NONHDLC SERPL-MCNC: 176 MG/DL
PLATELET # BLD AUTO: 280 10E3/UL (ref 150–450)
POTASSIUM SERPL-SCNC: 3.9 MMOL/L (ref 3.4–5.3)
RBC # BLD AUTO: 4.6 10E6/UL (ref 3.8–5.2)
SODIUM SERPL-SCNC: 139 MMOL/L (ref 136–145)
TRIGL SERPL-MCNC: 72 MG/DL
WBC # BLD AUTO: 3.2 10E3/UL (ref 4–11)

## 2022-09-20 PROCEDURE — 99396 PREV VISIT EST AGE 40-64: CPT | Mod: GC | Performed by: STUDENT IN AN ORGANIZED HEALTH CARE EDUCATION/TRAINING PROGRAM

## 2022-09-20 PROCEDURE — 80061 LIPID PANEL: CPT | Performed by: STUDENT IN AN ORGANIZED HEALTH CARE EDUCATION/TRAINING PROGRAM

## 2022-09-20 PROCEDURE — 82306 VITAMIN D 25 HYDROXY: CPT | Performed by: STUDENT IN AN ORGANIZED HEALTH CARE EDUCATION/TRAINING PROGRAM

## 2022-09-20 PROCEDURE — 83036 HEMOGLOBIN GLYCOSYLATED A1C: CPT | Performed by: STUDENT IN AN ORGANIZED HEALTH CARE EDUCATION/TRAINING PROGRAM

## 2022-09-20 PROCEDURE — 80048 BASIC METABOLIC PNL TOTAL CA: CPT | Performed by: STUDENT IN AN ORGANIZED HEALTH CARE EDUCATION/TRAINING PROGRAM

## 2022-09-20 PROCEDURE — 85027 COMPLETE CBC AUTOMATED: CPT | Performed by: STUDENT IN AN ORGANIZED HEALTH CARE EDUCATION/TRAINING PROGRAM

## 2022-09-20 PROCEDURE — 88175 CYTOPATH C/V AUTO FLUID REDO: CPT | Performed by: STUDENT IN AN ORGANIZED HEALTH CARE EDUCATION/TRAINING PROGRAM

## 2022-09-20 PROCEDURE — 36415 COLL VENOUS BLD VENIPUNCTURE: CPT | Performed by: STUDENT IN AN ORGANIZED HEALTH CARE EDUCATION/TRAINING PROGRAM

## 2022-09-20 PROCEDURE — 87624 HPV HI-RISK TYP POOLED RSLT: CPT | Performed by: STUDENT IN AN ORGANIZED HEALTH CARE EDUCATION/TRAINING PROGRAM

## 2022-09-20 RX ORDER — FERROUS SULFATE 325(65) MG
325 TABLET ORAL
COMMUNITY

## 2022-09-20 RX ORDER — CALCIUM CARBONATE 500 MG/1
1 TABLET, CHEWABLE ORAL 2 TIMES DAILY
COMMUNITY

## 2022-09-20 ASSESSMENT — ENCOUNTER SYMPTOMS
HEMATOCHEZIA: 0
JOINT SWELLING: 0
MYALGIAS: 0
HEARTBURN: 1
EYE PAIN: 0
WEAKNESS: 0
PALPITATIONS: 0
SHORTNESS OF BREATH: 0
CONSTIPATION: 0
DIZZINESS: 0
SORE THROAT: 0
HEADACHES: 0
DYSURIA: 0
ARTHRALGIAS: 0
NERVOUS/ANXIOUS: 0
PARESTHESIAS: 0
CHILLS: 0
ABDOMINAL PAIN: 0
FREQUENCY: 0
FEVER: 0
NAUSEA: 0
COUGH: 0
DIARRHEA: 0
HEMATURIA: 0

## 2022-09-20 NOTE — PROGRESS NOTES
SUBJECTIVE:   CC: Marti is an 42 year old who presents for preventive health visit.       Healthy Habits:     Getting at least 3 servings of Calcium per day:  NO    Bi-annual eye exam:  NO    Dental care twice a year:  Yes    Sleep apnea or symptoms of sleep apnea:  None    Diet:  Regular (no restrictions)    Frequency of exercise:  6-7 days/week    Duration of exercise:  30-45 minutes    Taking medications regularly:  Yes    Medication side effects:  Not applicable and None    PHQ-2 Total Score: 0    Additional concerns today:  No    Other concerns:  - iron, diabetes, potassium, cholesterol    6 kids  - recent miscarriage  - no contraception, sexually active  - thinks her fertility is decreasing  - has used IUD, pills  - IUD was misplaced in uterus  - got pregnant when she was on pills  - nexplanon - not interested   - not interested in discussing contraception further    Today's PHQ-2 Score:   PHQ-2 ( 1999 [employer]) 9/20/2022   Q1: Little interest or pleasure in doing things 0   Q2: Feeling down, depressed or hopeless 0   PHQ-2 Score 0   PHQ-2 Total Score (12-17 Years)- Positive if 3 or more points; Administer PHQ-A if positive -   Q1: Little interest or pleasure in doing things Not at all   Q2: Feeling down, depressed or hopeless Not at all   PHQ-2 Score 0       Abuse: Current or Past (Physical, Sexual or Emotional) - No  Do you feel safe in your environment? Yes    Have you ever done Advance Care Planning? (For example, a Health Directive, POLST, or a discussion with a medical provider or your loved ones about your wishes): NO, form given for review    Social History     Tobacco Use     Smoking status: Never Smoker     Smokeless tobacco: Never Used   Substance Use Topics     Alcohol use: No       Alcohol Use 9/20/2022   Prescreen: >3 drinks/day or >7 drinks/week? No       Reviewed orders with patient.  Reviewed health maintenance and updated orders accordingly - Yes      Breast Cancer Screening:  Any new  diagnosis of family breast, ovarian, or bowel cancer? no    FHS-7: No flowsheet data found.    Plans to start breast cancer screening at age 50.   Pertinent mammograms are reviewed under the imaging tab.    History of abnormal Pap smear: NO - age 30-65 PAP every 5 years with negative HPV co-testing recommended     Reviewed and updated as needed this visit by clinical staff   Tobacco  Allergies  Meds  Problems  Med Hx  Surg Hx  Fam Hx  Soc   Hx          Reviewed and updated as needed this visit by Provider     Meds  Problems    Fam Hx             Takes 7152-0077 U for 1-2 week, takes intermittently    Review of Systems   Constitutional: Negative for chills and fever.   HENT: Negative for congestion, ear pain, hearing loss and sore throat.    Eyes: Negative for pain and visual disturbance.   Respiratory: Negative for cough and shortness of breath.    Cardiovascular: Negative for chest pain, palpitations and peripheral edema.   Gastrointestinal: Positive for heartburn. Negative for abdominal pain, constipation, diarrhea, hematochezia and nausea.   Genitourinary: Negative for dysuria, frequency, genital sores, hematuria and urgency.   Musculoskeletal: Negative for arthralgias, joint swelling and myalgias.   Skin: Negative for rash.   Neurological: Negative for dizziness, weakness, headaches and paresthesias.   Psychiatric/Behavioral: Negative for mood changes. The patient is not nervous/anxious.        Answers for HPI/ROS submitted by the patient on 9/20/2022  Frequency of exercise:: 6-7 days/week  Getting at least 3 servings of Calcium per day:: NO  Diet:: Regular (no restrictions)  Taking medications regularly:: Yes  Medication side effects:: Not applicable, None  Bi-annual eye exam:: NO  Dental care twice a year:: Yes  Sleep apnea or symptoms of sleep apnea:: None  abdominal pain: No  Blood in stool: No  Blood in urine: No  chest pain: No  chills: No  congestion: No  constipation: No  cough: No  diarrhea:  "No  dizziness: No  ear pain: No  eye pain: No  nervous/anxious: No  fever: No  frequency: No  genital sores: No  headaches: No  hearing loss: No  heartburn: Yes  arthralgias: No  joint swelling: No  peripheral edema: No  mood changes: No  myalgias: No  nausea: No  dysuria: No  palpitations: No  Skin sensation changes: No  sore throat: No  urgency: No  rash: No  shortness of breath: No  visual disturbance: No  weakness: No  Additional concerns today:: No  Duration of exercise:: 30-45 minutes       OBJECTIVE:   /68   Pulse 76   Temp 98.5  F (36.9  C) (Oral)   Resp 16   Ht 1.638 m (5' 4.5\")   Wt 91.4 kg (201 lb 6.4 oz)   SpO2 97%   BMI 34.04 kg/m    Physical Exam  Exam conducted with a chaperone present.   Constitutional:       General: She is not in acute distress.     Appearance: Normal appearance. She is not ill-appearing.   HENT:      Head: Normocephalic and atraumatic.      Nose: No congestion or rhinorrhea.      Mouth/Throat:      Mouth: Mucous membranes are moist.      Pharynx: Oropharynx is clear. No oropharyngeal exudate or posterior oropharyngeal erythema.   Eyes:      Extraocular Movements: Extraocular movements intact.      Conjunctiva/sclera: Conjunctivae normal.      Pupils: Pupils are equal, round, and reactive to light.      Comments: Normal funduscopic exam bilaterally   Cardiovascular:      Rate and Rhythm: Normal rate and regular rhythm.      Heart sounds: Normal heart sounds. No murmur heard.    No friction rub. No gallop.   Pulmonary:      Effort: Pulmonary effort is normal.      Breath sounds: Normal breath sounds. No wheezing, rhonchi or rales.   Abdominal:      General: Bowel sounds are normal. There is no distension.      Palpations: Abdomen is soft. There is no mass.      Tenderness: There is no abdominal tenderness.      Hernia: No hernia is present.   Genitourinary:     Vagina: No vaginal discharge, bleeding or lesions.      Cervix: No discharge, lesion or erythema.      " Comments: S/p female circumcision.   Vulva without lesions.  Musculoskeletal:      Cervical back: Neck supple. No tenderness.      Right lower leg: No edema.      Left lower leg: No edema.   Lymphadenopathy:      Cervical: No cervical adenopathy.   Skin:     General: Skin is warm and dry.   Neurological:      General: No focal deficit present.      Mental Status: She is alert and oriented to person, place, and time.   Psychiatric:         Mood and Affect: Mood normal.         Behavior: Behavior normal.         Thought Content: Thought content normal.         Judgment: Judgment normal.           ASSESSMENT/PLAN:   Marti was seen today for physical.    Diagnoses and all orders for this visit:    Encounter for routine adult health examination without abnormal findings  Discussed starting mammograms at age 50.     Screening for cervical cancer  -     Pap imaged thin layer screen with HPV - recommended age 30 - 65 years    Vitamin D deficiency  History of deficiency. Intermittently taking supplementation with 2000-5000U daily.   -     Vitamin D Level; Future  -     Vitamin D Level    Class 1 obesity without serious comorbidity with body mass index (BMI) of 34.0 to 34.9 in adult, unspecified obesity type  -     Hemoglobin A1c; Future  -     Lipid panel reflex to direct LDL Non-fasting; Future    Gastroesophageal reflux disease with esophagitis without hemorrhage  Symptoms well-controlled with OTC tums and famotidine.   -     Basic metabolic panel; Future  -     CBC with platelets; Future    Vaccine counseling  Declines influenza, COVID, and TDAP vaccination. Reports her family has strong immune systems and doesn't need flu or additional COVID vaccination. Advised on how she would be protecting others with decreased immune systems if she were to get vaccinated. Reports she received TDAP in her pregnancies, although not documented in Medical records or [facility].    Encounter for other general counseling or advice on  "contraception  Discussed contraception as patient feels family size is complete. Not interested in starting any contraceptives at this time. Discussed that she is most likely still able to become pregnant since she was recently pregnant. Discussed average age of menopause of 52.       COUNSELING:  Reviewed preventive health counseling, as reflected in patient instructions        Estimated body mass index is 34.04 kg/m  as calculated from the following:    Height as of this encounter: 1.638 m (5' 4.5\").    Weight as of this encounter: 91.4 kg (201 lb 6.4 oz).      She reports that she has never smoked. She has never used smokeless tobacco.      Danielle Duggan MD  [facility]  "

## 2022-09-20 NOTE — LETTER
October 13, 2022      Marti Bookeryaritza Vale  [address] [facility] [address]        Dear ,    We are writing to inform you of your test results. Your cervical cancer screening was negative, which is great! You don't need another screening pap smear for another 5 years. Your vitamin D level is normal. Your electrolytes are normal. Your white blood cells are a little bit low, so we will want to recheck that in the future to make sure it is back to normal. You don't have anemia (low red blood cells).     Your A1c was a little high. In the pre-diabetes range. This means that you are at risk for developing diabetes. I'd recommend that you increase your physical activity, try to have 30 minutes of activity at least 5 times a week, and also decrease the sugar in your diet. Your cholesterol was high, which means you could be at increased risk for having heart disease. You don't need a medication for this now, but physical activity and decreasing the saturated fats in your diet, will help your long term health. Try to increase the fruits and vegetables in your diet.     You can set up an appointment to further discuss your physical activity and dietary changes.         Resulted Orders   Pap imaged thin layer screen with HPV - recommended age 30 - 65 years   Result Value Ref Range    Interpretation        Negative for Intraepithelial Lesion or Malignancy (NILM)    Comment         Papanicolaou Test Limitations:  Cervical cytology is a screening test with limited sensitivity, and regular screening is critical for cancer prevention.  Pap tests are primarily effective for the diagnosis/prevention of squamous cell carcinoma, not adenocarcinoma or other cancers.        Specimen Adequacy       Satisfactory for evaluation, endocervical/transformation zone component present    Clinical Information       none      Reflex Testing Yes regardless of result     Previous Abnormal?       No      Performing Labs        The technical component of this testing was completed at [facility]     Hemoglobin A1c   Result Value Ref Range    Hemoglobin A1C 5.9 (H) 0.0 - 5.6 %      Comment:      Normal <5.7%   Prediabetes 5.7-6.4%    Diabetes 6.5% or higher     Note: Adopted from ADA consensus guidelines.   Lipid panel reflex to direct LDL Non-fasting   Result Value Ref Range    Cholesterol 246 (H) <200 mg/dL    Triglycerides 72 <150 mg/dL    Direct Measure HDL 70 >=50 mg/dL    LDL Cholesterol Calculated 162 (H) <=100 mg/dL    Non HDL Cholesterol 176 (H) <130 mg/dL    Narrative    Cholesterol  Desirable:  <200 mg/dL    Triglycerides  Normal:  Less than 150 mg/dL  Borderline High:  150-199 mg/dL  High:  200-499 mg/dL  Very High:  Greater than or equal to 500 mg/dL    Direct Measure HDL  Female:  Greater than or equal to 50 mg/dL   Male:  Greater than or equal to 40 mg/dL    LDL Cholesterol  Desirable:  <100mg/dL  Above Desirable:  100-129 mg/dL   Borderline High:  130-159 mg/dL   High:  160-189 mg/dL   Very High:  >= 190 mg/dL    Non HDL Cholesterol  Desirable:  130 mg/dL  Above Desirable:  130-159 mg/dL  Borderline High:  160-189 mg/dL  High:  190-219 mg/dL  Very High:  Greater than or equal to 220 mg/dL   Basic metabolic panel   Result Value Ref Range    Sodium 139 136 - 145 mmol/L    Potassium 3.9 3.4 - 5.3 mmol/L    Chloride 101 98 - 107 mmol/L    Carbon Dioxide (CO2) 29 22 - 29 mmol/L    Anion Gap 9 7 - 15 mmol/L    Urea Nitrogen 10.6 6.0 - 20.0 mg/dL    Creatinine 0.49 (L) 0.51 - 0.95 mg/dL    Calcium 9.6 8.6 - 10.0 mg/dL    Glucose 98 70 - 99 mg/dL    GFR Estimate >90 >60 mL/min/1.73m2      Comment:      Effective December 21, 2021 eGFRcr in adults is calculated using the 2021 CKD-EPI creatinine equation which includes age and gender (Leatha ivey al., NEJM, DOI: 10.1056/OJCLnd1883027)   CBC with platelets   Result Value Ref Range    WBC Count 3.2 (L) 4.0 - 11.0 10e3/uL    RBC  Count 4.60 3.80 - 5.20 10e6/uL    Hemoglobin 13.1 11.7 - 15.7 g/dL    Hematocrit 40.8 35.0 - 47.0 %    MCV 89 78 - 100 fL    MCH 28.5 26.5 - 33.0 pg    MCHC 32.1 31.5 - 36.5 g/dL    RDW 12.3 10.0 - 15.0 %    Platelet Count 280 150 - 450 10e3/uL   Vitamin D Level   Result Value Ref Range    Vitamin D, Total (25-Hydroxy) 30 20 - 75 ug/L    Narrative    Season, race, dietary intake, and treatment affect the concentration of 25-hydroxy-Vitamin D. Values may decrease during winter months and increase during summer months. Values 20-29 ug/L may indicate Vitamin D insufficiency and values <20 ug/L may indicate Vitamin D deficiency.    Vitamin D determination is routinely performed by an immunoassay specific for 25 hydroxyvitamin D3.  If an individual is on vitamin D2(ergocalciferol) supplementation, please specify 25 OH vitamin D2 and D3 level determination by LCMSMS test VITD23.     HPV High Risk Types DNA Cervical   Result Value Ref Range    Other HR HPV Negative Negative    HPV16 DNA Negative Negative    HPV18 DNA Negative Negative    FINAL DIAGNOSIS       This patient's sample is negative for HPV DNA.        This test was developed and its performance characteristics determined by the [facility]. It has not been cleared or approved by the FDA. The laboratory is regulated under CLIA as qualified to perform high-complexity testing. This test is used for clinical purposes. It should not be regarded as investigational or for research.    METHODOLOGY: The Roche Dedra 4800 system uses automated extraction, simultaneous amplification of HPV (L1 region) and beta-globin, followed by real time detection of fluorescent labeled HPV and beta globin using specific oligonucleotide probes. The test specifically identifies types HPV 16 DNA and HPV 18 DNA while concurrently detecting the rest of the high risk types (31, 33, 35, 39, 45, 51, 52, 56, 58, 59, 66 or  68).    COMMENTS: This test is not intended for use as a screening device for woman under age 30 with normal cervical cytology. Results should be correlated with cytologic and histologic findings. Close clinical followup is recommended.           If you have any questions or concerns, please call the clinic at the number listed above.       Sincerely,      Danielle Duggan MD

## 2022-09-20 NOTE — PROGRESS NOTES
Preceptor Attestation:   Patient seen, evaluated and discussed with the resident. I have verified the content of the note, which accurately reflects my assessment of the patient and the plan of care.   Supervising Physician:  Cydney Guerrero MD

## 2022-09-22 LAB
BKR LAB AP GYN ADEQUACY: NORMAL
BKR LAB AP GYN INTERPRETATION: NORMAL
BKR LAB AP HPV REFLEX: NORMAL
BKR LAB AP PREVIOUS ABNORMAL: NORMAL
PATH REPORT.COMMENTS IMP SPEC: NORMAL
PATH REPORT.COMMENTS IMP SPEC: NORMAL
PATH REPORT.RELEVANT HX SPEC: NORMAL

## 2022-09-23 LAB
HUMAN PAPILLOMA VIRUS 16 DNA: NEGATIVE
HUMAN PAPILLOMA VIRUS 18 DNA: NEGATIVE
HUMAN PAPILLOMA VIRUS FINAL DIAGNOSIS: NORMAL
HUMAN PAPILLOMA VIRUS OTHER HR: NEGATIVE

## 2022-12-12 ENCOUNTER — OFFICE VISIT (OUTPATIENT)
Dept: FAMILY MEDICINE | Facility: CLINIC | Age: 42
End: 2022-12-12
Payer: COMMERCIAL

## 2022-12-12 VITALS
RESPIRATION RATE: 16 BRPM | WEIGHT: 205.8 LBS | TEMPERATURE: 98.6 F | HEIGHT: 64 IN | HEART RATE: 79 BPM | BODY MASS INDEX: 35.13 KG/M2 | SYSTOLIC BLOOD PRESSURE: 126 MMHG | DIASTOLIC BLOOD PRESSURE: 74 MMHG | OXYGEN SATURATION: 99 %

## 2022-12-12 DIAGNOSIS — R53.83 FATIGUE, UNSPECIFIED TYPE: ICD-10-CM

## 2022-12-12 DIAGNOSIS — K21.9 GASTROESOPHAGEAL REFLUX DISEASE WITHOUT ESOPHAGITIS: Primary | ICD-10-CM

## 2022-12-12 DIAGNOSIS — D50.9 IRON DEFICIENCY ANEMIA, UNSPECIFIED IRON DEFICIENCY ANEMIA TYPE: ICD-10-CM

## 2022-12-12 LAB
ERYTHROCYTE [DISTWIDTH] IN BLOOD BY AUTOMATED COUNT: 12.3 % (ref 10–15)
HCG UR QL: NEGATIVE
HCT VFR BLD AUTO: 41.8 % (ref 35–47)
HGB BLD-MCNC: 13.4 G/DL (ref 11.7–15.7)
MCH RBC QN AUTO: 28.3 PG (ref 26.5–33)
MCHC RBC AUTO-ENTMCNC: 32.1 G/DL (ref 31.5–36.5)
MCV RBC AUTO: 88 FL (ref 78–100)
PLATELET # BLD AUTO: 307 10E3/UL (ref 150–450)
RBC # BLD AUTO: 4.74 10E6/UL (ref 3.8–5.2)
WBC # BLD AUTO: 5.1 10E3/UL (ref 4–11)

## 2022-12-12 PROCEDURE — 84466 ASSAY OF TRANSFERRIN: CPT | Performed by: STUDENT IN AN ORGANIZED HEALTH CARE EDUCATION/TRAINING PROGRAM

## 2022-12-12 PROCEDURE — 99214 OFFICE O/P EST MOD 30 MIN: CPT | Mod: GC | Performed by: STUDENT IN AN ORGANIZED HEALTH CARE EDUCATION/TRAINING PROGRAM

## 2022-12-12 PROCEDURE — 80048 BASIC METABOLIC PNL TOTAL CA: CPT | Performed by: STUDENT IN AN ORGANIZED HEALTH CARE EDUCATION/TRAINING PROGRAM

## 2022-12-12 PROCEDURE — 85027 COMPLETE CBC AUTOMATED: CPT | Performed by: STUDENT IN AN ORGANIZED HEALTH CARE EDUCATION/TRAINING PROGRAM

## 2022-12-12 PROCEDURE — 82728 ASSAY OF FERRITIN: CPT | Performed by: STUDENT IN AN ORGANIZED HEALTH CARE EDUCATION/TRAINING PROGRAM

## 2022-12-12 PROCEDURE — 36415 COLL VENOUS BLD VENIPUNCTURE: CPT | Performed by: STUDENT IN AN ORGANIZED HEALTH CARE EDUCATION/TRAINING PROGRAM

## 2022-12-12 PROCEDURE — 84443 ASSAY THYROID STIM HORMONE: CPT | Performed by: STUDENT IN AN ORGANIZED HEALTH CARE EDUCATION/TRAINING PROGRAM

## 2022-12-12 PROCEDURE — 83540 ASSAY OF IRON: CPT | Performed by: STUDENT IN AN ORGANIZED HEALTH CARE EDUCATION/TRAINING PROGRAM

## 2022-12-12 PROCEDURE — 81025 URINE PREGNANCY TEST: CPT | Performed by: STUDENT IN AN ORGANIZED HEALTH CARE EDUCATION/TRAINING PROGRAM

## 2022-12-12 NOTE — PROGRESS NOTES
Assessment & Plan     Gastroesophageal reflux disease without esophagitis  Patient with ongoing chest discomfort similar to prior heartburn. Has tried OTC prilosec PRN with limited relief. Recommended h pylori testing in 2 weeks after off. Okay to use TUMS and simethicone PRN in the interim.   - Helicobacter pylori Antigen Stool    Iron deficiency anemia, unspecified iron deficiency anemia type  Patient requesting hgb and iron levels to be checked due to hx of anemia and wanting to know where these levels are. Hgb level normal today and reviewed with patient. Will follow-up on rest of iron studies. On ROS, patient reports intermittent fatigue, but feels this is not a major issue. Also requesting UPT which was negative.  - CBC with platelets  - Basic metabolic panel  - IRON AND IRON BINDING CAPACITY  - Ferritin  - Transferrin  - TSH with free T4 reflex  - HCG qualitative urine    Declined COVID, flu, Tdap vaccines    No follow-ups on file.    Giovani Nagel MD  [facility] RENE Kidd is a 42 year old with PMHx significant for GERD and iron deficiency anemia, presenting for the following health issues:  Fatigue (Pt reports x 1 or 2 weeks fatigue followed  by chest discomfort. Pt also request lab results from 9/20/22)    Phone  used: [place]      HPI     Patient reports worsening acid reflex over the past 2 weeks, causing chest discomfort and fatigue. Patient says it feels less like a chest pain and more like a heavy/pressure sensation. She says if she doesn't take her GERD medications, the symptoms persist almost 24 hours. Tums is not helpful but OTC Prilosec and famotidine help, but she stopped taking the Prilosec a few days ago due to worry that it causes the chest discomfort.     Additionally, patient has history of iron deficiency anemia but does not take iron supplement regularly. Also reports LMP was in October and worries that she may be pregnant.    No  "palpitations, sob, nausea, vomiting, diarrhea, constipation, abdominal pain, black/dark stool, urinary symptoms, or new rash.       Review of Systems   Constitutional, HEENT, cardiovascular, pulmonary, gi and gu systems are negative, except as otherwise noted.      Objective    /74 (BP Location: Left arm, Patient Position: Sitting, Cuff Size: Adult Large)   Pulse 79   Temp 98.6  F (37  C) (Oral)   Resp 16   Ht 1.626 m (5' 4\")   Wt 93.4 kg (205 lb 12.8 oz)   SpO2 99%   BMI 35.33 kg/m    Body mass index is 35.33 kg/m .  Physical Exam   GENERAL: healthy, alert and no distress  NECK: no adenopathy, no asymmetry, masses, or scars and thyroid normal to palpation  RESP: lungs clear to auscultation - no rales, rhonchi or wheezes  CV: regular rate and rhythm, normal S1 S2, no S3 or S4, no murmur, click or rub, no peripheral edema and peripheral pulses strong  ABDOMEN: soft, nontender, no hepatosplenomegaly, no masses and bowel sounds normal  MS: no gross musculoskeletal defects noted, no edema      Kristi Goddard, MS3 on 12/12/2022 at 5:24 PM      Resident/Fellow Attestation   I, Giovani Nagel MD, was present with the medical/[employer] student who participated in the service and in the documentation of the note.  I have verified the history and personally performed the physical exam and medical decision making.  I agree with the assessment and plan of care as documented in the note.      Giovani Nagel MD  PGY3      "

## 2022-12-12 NOTE — PROGRESS NOTES
Preceptor Attestation:    I discussed the patient with the resident and evaluated the patient in person. I have verified the content of the note, which accurately reflects my assessment of the patient and the plan of care.   Supervising Physician:  Ashley Joyce DO.

## 2022-12-13 LAB
ANION GAP SERPL CALCULATED.3IONS-SCNC: 16 MMOL/L (ref 7–15)
BUN SERPL-MCNC: 10.1 MG/DL (ref 6–20)
CALCIUM SERPL-MCNC: 9.9 MG/DL (ref 8.6–10)
CHLORIDE SERPL-SCNC: 100 MMOL/L (ref 98–107)
CREAT SERPL-MCNC: 0.54 MG/DL (ref 0.51–0.95)
DEPRECATED HCO3 PLAS-SCNC: 24 MMOL/L (ref 22–29)
FERRITIN SERPL-MCNC: 89 NG/ML (ref 6–175)
GFR SERPL CREATININE-BSD FRML MDRD: >90 ML/MIN/1.73M2
GLUCOSE SERPL-MCNC: 95 MG/DL (ref 70–99)
IRON BINDING CAPACITY (ROCHE): 318 UG/DL (ref 240–430)
IRON SATN MFR SERPL: 18 % (ref 15–46)
IRON SERPL-MCNC: 56 UG/DL (ref 37–145)
POTASSIUM SERPL-SCNC: 3.4 MMOL/L (ref 3.4–5.3)
SODIUM SERPL-SCNC: 140 MMOL/L (ref 136–145)
TRANSFERRIN SERPL-MCNC: 255 MG/DL (ref 200–360)
TSH SERPL DL<=0.005 MIU/L-ACNC: 1.55 UIU/ML (ref 0.3–4.2)

## 2024-05-16 DIAGNOSIS — K21.9 GASTROESOPHAGEAL REFLUX DISEASE, UNSPECIFIED WHETHER ESOPHAGITIS PRESENT: ICD-10-CM

## 2024-05-16 NOTE — TELEPHONE ENCOUNTER
"Request for medication refill:     famotidine (PEPCID) 20 MG tablet    Providers if patient needs an appointment and you are willing to give a one month supply please refill for one month and  send a letter/MyChart using \".SMILLIMITEDREFILL\" .smillimited and route chart to \"[facility] \" (Giving one month refill in non controlled medications is strongly recommended before denial)    If refill has been denied, meaning absolutely no refills without visit, please complete the smart phrase \".smirxrefuse\" and route it to the \"[facility] MED REFILLS\"  pool to inform the patient and the pharmacy.    Tasia Flores MA      "

## 2024-05-18 RX ORDER — FAMOTIDINE 20 MG/1
20 TABLET, FILM COATED ORAL 2 TIMES DAILY
Qty: 60 TABLET | Refills: 3 | Status: SHIPPED | OUTPATIENT
Start: 2024-05-18

## (undated) DEVICE — ESU PENCIL W/HOLSTER E2350H

## (undated) DEVICE — CATH TRAY FOLEY SURESTEP 16FR WDRAIN BAG STLK LATEX A300316A

## (undated) DEVICE — GLOVE PROTEXIS W/NEU-THERA 7.0  2D73TE70

## (undated) DEVICE — TUBING SMOKE EVAC 1CMX3M SEA3710

## (undated) DEVICE — ESU PENCIL W/SMOKE EVAC NEPTUNE STRYKER 0703-046-000

## (undated) DEVICE — SU PLAIN 0 TIE 54" S104H

## (undated) DEVICE — LIGHT HANDLE X2

## (undated) DEVICE — DRSG ABDOMINAL 07 1/2X8" 7197D

## (undated) DEVICE — LINEN ORTHO PACK 5446

## (undated) DEVICE — BASIN SET MAJOR

## (undated) DEVICE — SOL NACL 0.9% IRRIG 1000ML BOTTLE 2F7124

## (undated) DEVICE — PREP POVIDONE IODINE SCRUB 7.5% 120ML

## (undated) DEVICE — PACK C-SECTION LF PL15OTA83B

## (undated) DEVICE — LINEN TOWEL PACK X5 5464

## (undated) DEVICE — GLOVE PROTEXIS BLUE W/NEU-THERA 7.5  2D73EB75

## (undated) DEVICE — SOL WATER IRRIG 1000ML BOTTLE 2F7114

## (undated) DEVICE — SU VICRYL 0 CT-1 36" J346H

## (undated) DEVICE — SUCTION TIP POOLE K770

## (undated) DEVICE — PREP POVIDONE IODINE SOLUTION 10% 120ML

## (undated) DEVICE — WIPES FOLEY CARE SURESTEP PROVON DFC100

## (undated) DEVICE — LINEN GOWN X4 5410

## (undated) DEVICE — ESU GROUND PAD UNIVERSAL W/O CORD

## (undated) RX ORDER — KETOROLAC TROMETHAMINE 30 MG/ML
INJECTION, SOLUTION INTRAMUSCULAR; INTRAVENOUS
Status: DISPENSED
Start: 2017-10-19

## (undated) RX ORDER — MORPHINE SULFATE 1 MG/ML
INJECTION, SOLUTION EPIDURAL; INTRATHECAL; INTRAVENOUS
Status: DISPENSED
Start: 2017-10-19